# Patient Record
Sex: FEMALE | Race: WHITE | Employment: OTHER | URBAN - METROPOLITAN AREA
[De-identification: names, ages, dates, MRNs, and addresses within clinical notes are randomized per-mention and may not be internally consistent; named-entity substitution may affect disease eponyms.]

---

## 2020-02-10 ENCOUNTER — APPOINTMENT (OUTPATIENT)
Dept: GENERAL RADIOLOGY | Age: 60
End: 2020-02-10
Payer: MEDICARE

## 2020-02-10 ENCOUNTER — HOSPITAL ENCOUNTER (EMERGENCY)
Age: 60
Discharge: HOME | End: 2020-02-10
Payer: MEDICARE

## 2020-02-10 VITALS
SYSTOLIC BLOOD PRESSURE: 121 MMHG | DIASTOLIC BLOOD PRESSURE: 74 MMHG | RESPIRATION RATE: 20 BRPM | HEART RATE: 73 BPM | BODY MASS INDEX: 36.25 KG/M2 | OXYGEN SATURATION: 98 % | HEIGHT: 61 IN | WEIGHT: 192 LBS | TEMPERATURE: 98.1 F

## 2020-02-10 PROCEDURE — 99283 EMERGENCY DEPT VISIT LOW MDM: CPT

## 2020-02-10 PROCEDURE — 6370000000 HC RX 637 (ALT 250 FOR IP): Performed by: NURSE PRACTITIONER

## 2020-02-10 PROCEDURE — 71101 X-RAY EXAM UNILAT RIBS/CHEST: CPT

## 2020-02-10 RX ORDER — HYDROCODONE BITARTRATE AND ACETAMINOPHEN 5; 325 MG/1; MG/1
1 TABLET ORAL EVERY 6 HOURS PRN
Qty: 10 TABLET | Refills: 0 | Status: SHIPPED | OUTPATIENT
Start: 2020-02-10 | End: 2020-02-13

## 2020-02-10 RX ORDER — CYCLOBENZAPRINE HCL 10 MG
10 TABLET ORAL 3 TIMES DAILY PRN
Qty: 15 TABLET | Refills: 0 | Status: SHIPPED | OUTPATIENT
Start: 2020-02-10 | End: 2020-02-20

## 2020-02-10 RX ORDER — CYCLOBENZAPRINE HCL 10 MG
10 TABLET ORAL ONCE
Status: COMPLETED | OUTPATIENT
Start: 2020-02-10 | End: 2020-02-10

## 2020-02-10 RX ORDER — HYDROCODONE BITARTRATE AND ACETAMINOPHEN 5; 325 MG/1; MG/1
1 TABLET ORAL ONCE
Status: COMPLETED | OUTPATIENT
Start: 2020-02-10 | End: 2020-02-10

## 2020-02-10 RX ADMIN — HYDROCODONE BITARTRATE AND ACETAMINOPHEN 1 TABLET: 5; 325 TABLET ORAL at 11:54

## 2020-02-10 RX ADMIN — CYCLOBENZAPRINE 10 MG: 10 TABLET, FILM COATED ORAL at 11:55

## 2020-02-10 ASSESSMENT — ENCOUNTER SYMPTOMS
BACK PAIN: 0
CHEST TIGHTNESS: 0
SORE THROAT: 0
ABDOMINAL PAIN: 0
WHEEZING: 0
COUGH: 0
TROUBLE SWALLOWING: 0
NAUSEA: 0
SHORTNESS OF BREATH: 0
VOMITING: 0
DIARRHEA: 0

## 2020-02-10 ASSESSMENT — PAIN DESCRIPTION - PAIN TYPE: TYPE: ACUTE PAIN

## 2020-02-10 ASSESSMENT — PAIN DESCRIPTION - DESCRIPTORS: DESCRIPTORS: ACHING

## 2020-02-10 ASSESSMENT — PAIN SCALES - GENERAL
PAINLEVEL_OUTOF10: 10
PAINLEVEL_OUTOF10: 10

## 2020-02-10 ASSESSMENT — PAIN DESCRIPTION - ORIENTATION: ORIENTATION: RIGHT

## 2020-02-10 ASSESSMENT — PAIN DESCRIPTION - LOCATION: LOCATION: RIB CAGE

## 2020-02-10 NOTE — ED NOTES
AAOX3, equal/even resp rate. RN reviewed DC instructions with pt, pt verbalized understanding of DC instructions. Pt verbalized understanding of medications, pt home with medication prescriptions. Pt ambulated at DC with no difficulty.      Jonny Varela RN  02/10/20 4134

## 2020-02-10 NOTE — ED PROVIDER NOTES
Non-medical: None   Tobacco Use    Smoking status: Current Every Day Smoker     Packs/day: 1.50     Types: Cigarettes    Smokeless tobacco: Never Used   Substance and Sexual Activity    Alcohol use: Not Currently    Drug use: None    Sexual activity: None   Lifestyle    Physical activity:     Days per week: None     Minutes per session: None    Stress: None   Relationships    Social connections:     Talks on phone: None     Gets together: None     Attends Mosque service: None     Active member of club or organization: None     Attends meetings of clubs or organizations: None     Relationship status: None    Intimate partner violence:     Fear of current or ex partner: None     Emotionally abused: None     Physically abused: None     Forced sexual activity: None   Other Topics Concern    None   Social History Narrative    None       SCREENINGS      @FLOW([id number])@      PHYSICAL EXAM    (up to 7 for level 4, 8 or more for level 5)     ED Triage Vitals [02/10/20 1054]   BP Temp Temp Source Pulse Resp SpO2 Height Weight   121/74 98.1 °F (36.7 °C) Oral 73 16 98 % 5' 1\" (1.549 m) 192 lb (87.1 kg)       Physical Exam  Vitals signs and nursing note reviewed. Constitutional:       Appearance: She is well-developed. HENT:      Head: Normocephalic and atraumatic. Right Ear: External ear normal.      Left Ear: External ear normal.   Eyes:      Conjunctiva/sclera: Conjunctivae normal.      Pupils: Pupils are equal, round, and reactive to light. Neck:      Musculoskeletal: Normal range of motion and neck supple. Cardiovascular:      Rate and Rhythm: Normal rate and regular rhythm. Pulmonary:      Effort: Pulmonary effort is normal.      Breath sounds: Normal breath sounds. No decreased air movement. No decreased breath sounds or wheezing. Chest:      Chest wall: Tenderness present. No deformity, swelling, crepitus or edema. There is no dullness to percussion.        Abdominal:      General: Bowel

## 2020-03-05 ENCOUNTER — OFFICE VISIT (OUTPATIENT)
Dept: FAMILY MEDICINE CLINIC | Age: 60
End: 2020-03-05
Payer: COMMERCIAL

## 2020-03-05 VITALS
HEIGHT: 61 IN | OXYGEN SATURATION: 98 % | WEIGHT: 198.6 LBS | BODY MASS INDEX: 37.49 KG/M2 | RESPIRATION RATE: 16 BRPM | SYSTOLIC BLOOD PRESSURE: 122 MMHG | DIASTOLIC BLOOD PRESSURE: 72 MMHG | HEART RATE: 62 BPM | TEMPERATURE: 97.1 F

## 2020-03-05 PROBLEM — M65.30 TRIGGER FINGER: Chronic | Status: ACTIVE | Noted: 2020-03-05

## 2020-03-05 PROBLEM — M54.50 CHRONIC MIDLINE LOW BACK PAIN WITHOUT SCIATICA: Chronic | Status: ACTIVE | Noted: 2020-03-05

## 2020-03-05 PROBLEM — R76.0 ABNORMAL ANTINUCLEAR ANTIBODY TITER: Chronic | Status: ACTIVE | Noted: 2020-03-05

## 2020-03-05 PROBLEM — K21.9 GERD (GASTROESOPHAGEAL REFLUX DISEASE): Chronic | Status: ACTIVE | Noted: 2020-03-05

## 2020-03-05 PROBLEM — F39 MOOD DISORDER (HCC): Chronic | Status: ACTIVE | Noted: 2020-03-05

## 2020-03-05 PROBLEM — G89.29 CHRONIC NECK PAIN: Chronic | Status: ACTIVE | Noted: 2020-03-05

## 2020-03-05 PROBLEM — D64.9 ANEMIA: Chronic | Status: ACTIVE | Noted: 2020-03-05

## 2020-03-05 PROBLEM — R00.0 TACHYCARDIA: Chronic | Status: ACTIVE | Noted: 2020-03-05

## 2020-03-05 PROBLEM — Z72.0 TOBACCO ABUSE: Chronic | Status: ACTIVE | Noted: 2020-03-05

## 2020-03-05 PROBLEM — M67.90 TENDINOPATHY: Chronic | Status: ACTIVE | Noted: 2020-03-05

## 2020-03-05 PROBLEM — M79.18 CHRONIC MUSCULOSKELETAL PAIN: Chronic | Status: ACTIVE | Noted: 2020-03-05

## 2020-03-05 PROBLEM — G89.29 CHRONIC MUSCULOSKELETAL PAIN: Chronic | Status: ACTIVE | Noted: 2020-03-05

## 2020-03-05 PROBLEM — E11.49 TYPE 2 DIABETES MELLITUS WITH NEUROLOGIC COMPLICATION, WITH LONG-TERM CURRENT USE OF INSULIN (HCC): Chronic | Status: ACTIVE | Noted: 2020-03-05

## 2020-03-05 PROBLEM — M54.2 CHRONIC NECK PAIN: Chronic | Status: ACTIVE | Noted: 2020-03-05

## 2020-03-05 PROBLEM — M85.80 OSTEOPENIA: Chronic | Status: ACTIVE | Noted: 2020-03-05

## 2020-03-05 PROBLEM — G89.29 CHRONIC MIDLINE LOW BACK PAIN WITHOUT SCIATICA: Chronic | Status: ACTIVE | Noted: 2020-03-05

## 2020-03-05 PROBLEM — Z79.4 TYPE 2 DIABETES MELLITUS WITH NEUROLOGIC COMPLICATION, WITH LONG-TERM CURRENT USE OF INSULIN (HCC): Chronic | Status: ACTIVE | Noted: 2020-03-05

## 2020-03-05 PROCEDURE — 4004F PT TOBACCO SCREEN RCVD TLK: CPT | Performed by: FAMILY MEDICINE

## 2020-03-05 PROCEDURE — 99204 OFFICE O/P NEW MOD 45 MIN: CPT | Performed by: FAMILY MEDICINE

## 2020-03-05 PROCEDURE — 2022F DILAT RTA XM EVC RTNOPTHY: CPT | Performed by: FAMILY MEDICINE

## 2020-03-05 PROCEDURE — 3017F COLORECTAL CA SCREEN DOC REV: CPT | Performed by: FAMILY MEDICINE

## 2020-03-05 PROCEDURE — G8417 CALC BMI ABV UP PARAM F/U: HCPCS | Performed by: FAMILY MEDICINE

## 2020-03-05 PROCEDURE — G8427 DOCREV CUR MEDS BY ELIG CLIN: HCPCS | Performed by: FAMILY MEDICINE

## 2020-03-05 PROCEDURE — 3046F HEMOGLOBIN A1C LEVEL >9.0%: CPT | Performed by: FAMILY MEDICINE

## 2020-03-05 PROCEDURE — G8484 FLU IMMUNIZE NO ADMIN: HCPCS | Performed by: FAMILY MEDICINE

## 2020-03-05 RX ORDER — ATENOLOL 25 MG/1
TABLET ORAL
COMMUNITY
Start: 2020-01-31 | End: 2020-04-28 | Stop reason: SDUPTHER

## 2020-03-05 RX ORDER — PRASTERONE (DHEA) 50 MG
1 TABLET ORAL DAILY
COMMUNITY
End: 2020-09-29

## 2020-03-05 RX ORDER — CLOTRIMAZOLE AND BETAMETHASONE DIPROPIONATE 10; .64 MG/G; MG/G
CREAM TOPICAL
COMMUNITY
Start: 2019-05-13 | End: 2020-09-29

## 2020-03-05 RX ORDER — CALCIUM CITRATE/VITAMIN D3 200MG-6.25
TABLET ORAL
COMMUNITY
Start: 2020-01-22 | End: 2020-07-31 | Stop reason: SDUPTHER

## 2020-03-05 RX ORDER — PEN NEEDLE, DIABETIC 32GX 5/32"
NEEDLE, DISPOSABLE MISCELLANEOUS
COMMUNITY
Start: 2020-01-03 | End: 2020-07-31 | Stop reason: SDUPTHER

## 2020-03-05 RX ORDER — INSULIN ASPART 100 [IU]/ML
INJECTION, SOLUTION INTRAVENOUS; SUBCUTANEOUS
COMMUNITY
Start: 2019-12-09 | End: 2020-04-28 | Stop reason: SDUPTHER

## 2020-03-05 RX ORDER — GLUCOSAM/CHON-MSM1/C/MANG/BOSW 500-416.6
TABLET ORAL
COMMUNITY
Start: 2020-01-21

## 2020-03-05 RX ORDER — ZOLPIDEM TARTRATE 10 MG/1
10 TABLET ORAL
COMMUNITY
Start: 2019-10-31 | End: 2021-03-22 | Stop reason: ALTCHOICE

## 2020-03-05 RX ORDER — HYDROXYZINE HYDROCHLORIDE 25 MG/1
TABLET, FILM COATED ORAL
COMMUNITY
Start: 2019-12-16 | End: 2020-03-05 | Stop reason: ALTCHOICE

## 2020-03-05 RX ORDER — PRAVASTATIN SODIUM 20 MG
TABLET ORAL
COMMUNITY
End: 2020-03-05

## 2020-03-05 RX ORDER — CYCLOBENZAPRINE HCL 10 MG
10 TABLET ORAL NIGHTLY PRN
Qty: 30 TABLET | Refills: 2 | Status: SHIPPED | OUTPATIENT
Start: 2020-03-05 | End: 2020-03-15

## 2020-03-05 RX ORDER — MELOXICAM 7.5 MG/1
TABLET ORAL
COMMUNITY
End: 2020-03-05 | Stop reason: ALTCHOICE

## 2020-03-05 RX ORDER — CIPROFLOXACIN 500 MG/1
TABLET, FILM COATED ORAL
COMMUNITY
End: 2020-03-05 | Stop reason: ALTCHOICE

## 2020-03-05 RX ORDER — FERROUS SULFATE 325(65) MG
325 TABLET ORAL
COMMUNITY
End: 2020-11-09 | Stop reason: ALTCHOICE

## 2020-03-05 RX ORDER — ESOMEPRAZOLE MAGNESIUM 40 MG/1
CAPSULE, DELAYED RELEASE ORAL
COMMUNITY
Start: 2020-01-27 | End: 2020-07-31 | Stop reason: SDUPTHER

## 2020-03-05 RX ORDER — PREGABALIN 100 MG/1
CAPSULE ORAL
COMMUNITY
Start: 2020-01-27 | End: 2020-04-28 | Stop reason: SDUPTHER

## 2020-03-05 RX ORDER — RANITIDINE 150 MG/1
TABLET ORAL
COMMUNITY
End: 2020-03-05

## 2020-03-05 RX ORDER — GLIPIZIDE 10 MG/1
TABLET ORAL
COMMUNITY
End: 2020-03-05

## 2020-03-05 RX ORDER — ACETAMINOPHEN 500 MG
500 TABLET ORAL
Status: ON HOLD | COMMUNITY
End: 2020-05-05 | Stop reason: HOSPADM

## 2020-03-05 RX ORDER — INSULIN GLARGINE 100 [IU]/ML
INJECTION, SOLUTION SUBCUTANEOUS
COMMUNITY
Start: 2020-02-03 | End: 2020-04-28 | Stop reason: SDUPTHER

## 2020-03-05 RX ORDER — GABAPENTIN 600 MG/1
TABLET ORAL
COMMUNITY
End: 2020-03-05 | Stop reason: ALTCHOICE

## 2020-03-05 RX ORDER — DIPHENHYDRAMINE HCL 25 MG
TABLET ORAL
COMMUNITY
Start: 2020-01-27

## 2020-03-05 RX ORDER — IBUPROFEN 800 MG/1
TABLET ORAL
COMMUNITY
Start: 2020-01-02 | End: 2020-03-05

## 2020-03-05 RX ORDER — AZITHROMYCIN 250 MG/1
TABLET, FILM COATED ORAL
COMMUNITY
End: 2020-03-05 | Stop reason: ALTCHOICE

## 2020-03-05 RX ORDER — LISINOPRIL 2.5 MG/1
TABLET ORAL
Status: ON HOLD | COMMUNITY
End: 2020-05-05 | Stop reason: ALTCHOICE

## 2020-03-05 RX ORDER — CEPHALEXIN 500 MG/1
CAPSULE ORAL
COMMUNITY
End: 2020-03-05 | Stop reason: ALTCHOICE

## 2020-03-05 RX ORDER — DULOXETIN HYDROCHLORIDE 60 MG/1
CAPSULE, DELAYED RELEASE ORAL
COMMUNITY
End: 2020-04-08 | Stop reason: SDUPTHER

## 2020-03-05 RX ORDER — HYDROXYZINE PAMOATE 25 MG/1
CAPSULE ORAL
COMMUNITY
End: 2020-07-31 | Stop reason: SDUPTHER

## 2020-03-05 ASSESSMENT — PATIENT HEALTH QUESTIONNAIRE - PHQ9
1. LITTLE INTEREST OR PLEASURE IN DOING THINGS: 0
SUM OF ALL RESPONSES TO PHQ9 QUESTIONS 1 & 2: 1
SUM OF ALL RESPONSES TO PHQ QUESTIONS 1-9: 1
2. FEELING DOWN, DEPRESSED OR HOPELESS: 1
SUM OF ALL RESPONSES TO PHQ QUESTIONS 1-9: 1

## 2020-03-05 NOTE — PROGRESS NOTES
Subjective  Vincent Mott, 63 y.o. female presents today with:  Chief Complaint   Patient presents with    Established New Doctor     Patient present today to Establish Care. Previous PCP from [address] [place] [address].  Health Maintenance     last mamm done in June of 2019. states not due for CRC           HPI    This is a new patient to me. I have reviewed the past medical and surgical history, social history and family history provided. I have reviewed  medication, previous testing and working diagnoses. I have reviewed the allergies and health maintenance information and correlated it into my decision making for this patient for care, diagnostics, consultations and treatment for today's visit. Takes ibuprofen for pain in every joint in her body and a bulging disc and fibromyalgia. Chronic constant pain in neck, low back, shoulders, knees. Hands go numb when shoulder are hurting. Flexeril helps her sleep. H/O elevated SOPHIA 1:180, homogeneous. H/O osteopenia with recent pathologic rib fracture. Tobacco use disorder. Smokes 1-2 packs/day. Has tried to quit by going cold turkey. Desires medical intervention in the future. Understands complications related to tobacco smoking. Has h/o tachycardia x years. Was put on atenolol which has been effective. Also has history of anemia and mood disorder. States mood disorder is well controlled with duloxetine. HAs two trigger fingers and was supposed to have surgery on the one on left hand. Left 3rd digit has had several injections. 4th digit on left hand lock up. Pain is severe when they are locked up. No other questions and or concerns for today's visit      Review of Systems no fevers, chills or sweats. Has chronic heartburn. No bloody or black tarry stools. Past Medical History:   Diagnosis Date    Arthritis     Diabetes mellitus ([address]     Fibromyalgia      History reviewed. No pertinent surgical history.   Social History     Socioeconomic History  Marital status:      Spouse name: Not on file    Number of children: Not on file    Years of education: Not on file    Highest education level: Not on file   Occupational History    Not on file   Social Needs    Financial resource strain: Not on file    Food insecurity     Worry: Not on file     Inability: Not on file    Transportation needs     Medical: Not on file     Non-medical: Not on file   Tobacco Use    Smoking status: Current Every Day Smoker     Packs/day: 1.50     Types: Cigarettes    Smokeless tobacco: Never Used   Substance and Sexual Activity    Alcohol use: Not Currently    Drug use: Not on file    Sexual activity: Not on file   Lifestyle    Physical activity     Days per week: Not on file     Minutes per session: Not on file    Stress: Not on file   Relationships    Social connections     Talks on phone: Not on file     Gets together: Not on file     Attends Caodaism service: Not on file     Active member of club or organization: Not on file     Attends meetings of clubs or organizations: Not on file     Relationship status: Not on file    Intimate partner violence     Fear of current or ex partner: Not on file     Emotionally abused: Not on file     Physically abused: Not on file     Forced sexual activity: Not on file   Other Topics Concern    Not on file   Social History Narrative    Not on file     History reviewed. No pertinent family history. Allergies   Allergen Reactions    Bactrim [Sulfamethoxazole-Trimethoprim]     Oxycodone     Statins      Current Outpatient Medications   Medication Sig Dispense Refill    zolpidem (AMBIEN) 10 MG tablet Take 10 mg by mouth. Take when have trouble to sleep.       pregabalin (LYRICA) 100 MG capsule TAKE 1 CAPSULE BY MOUTH TWICE DAILY FOR 90 DAYS      metFORMIN (GLUCOPHAGE) 1000 MG tablet TAKE 1 TABLET BY MOUTH TWICE DAILY WITH MEALS      TRUEplus Lancets 33G MISC USE 1 TO CHECK GLUCOSE 4 TIMES DAILY      BD PEN NEEDLE STEFFANIE U/F 32G X 4 MM MISC USE 1 TO INJECT INSULIN 4 TIMES DAILY      BASAGLAR KWIKPEN 100 UNIT/ML injection pen INJECT 55 UNITS SUBCUTANEOUSLY ONCE DAILY      NOVOLOG FLEXPEN 100 UNIT/ML injection pen INJECT 8 UNITS SUBCUTANEOUSLY THREE TIMES DAILY BEFORE MEAL(S)      TRUE METRIX BLOOD GLUCOSE TEST strip USE 1 STRIP TO CHECK GLUCOSE 4 TIMES DAILY      esomeprazole (NEXIUM) 40 MG delayed release capsule TAKE 1 CAPSULE BY MOUTH ONCE DAILY      clotrimazole-betamethasone (LOTRISONE) 1-0.05 % cream Apply topically      Blood Glucose Monitoring Suppl (TRUE METRIX AIR GLUCOSE METER) w/Device KIT USE TO CHECK GLUCOSE 4 TIMES DAILY      atenolol (TENORMIN) 25 MG tablet TAKE 1 TABLET BY MOUTH ONCE DAILY      acetaminophen (TYLENOL) 500 MG tablet Take 500 mg by mouth      hydrOXYzine (VISTARIL) 25 MG capsule hydroxyzine pamoate 25 mg capsule      DULoxetine (CYMBALTA) 60 MG extended release capsule duloxetine 60 mg capsule,delayed release      ferrous sulfate 325 (65 Fe) MG tablet Take 325 mg by mouth daily (with breakfast)      DHEA 50 MG TABS Take 1 tablet by mouth daily      cyclobenzaprine (FLEXERIL) 10 MG tablet Take 1 tablet by mouth nightly as needed for Muscle spasms 30 tablet 2    lisinopril (PRINIVIL;ZESTRIL) 2.5 MG tablet lisinopril 2.5 mg tablet       No current facility-administered medications for this visit. PMH, Surgical Hx, Family Hx, and Social Hxreviewed and updated. Health Maintenance reviewed.     Objective    Vitals:    03/05/20 1341   BP: 122/72   Site: Left Upper Arm   Position: Sitting   Cuff Size: Medium Adult   Pulse: 62   Resp: 16   Temp: 97.1 °F (36.2 °C)   TempSrc: Temporal   SpO2: 98%   Weight: 198 lb 9.6 oz (90.1 kg)   Height: 5' 1\" (1.549 m)        Physical Exam      No results found for: LABA1C  No results found for: LABMICR, CREATININE  No results found for: ALT, AST  No results found for: CHOL, TRIG, HDL, LDLCALC, LDLDIRECT     Assessment & Plan   Visit Diagnoses and Associated Orders     Vitamin D deficiency    -  Primary    Vitamin D 25 Hydroxy [87288 Custom]   - Future Order         Gastroesophageal reflux disease, esophagitis presence not specified        Stable and well-controlled with Nexium. Symptom reduction strategies reviewed         Type 2 diabetes mellitus with diabetic neuropathy, with long-term current use of insulin (HCC)        Worse, poor control in that chronic care follow-up is been lacking. Reviewed healthy diet and importance of adherence to medical plan. Labs ordered    Hemoglobin A1C [88030 Custom]   - Future Order    Comprehensive Metabolic Panel [70758 Custom]   - Future Order    Lipid, Fasting [85369 Custom]   - Future Order    TSH Without Reflex [[id number] Custom]   - Future Order         Anemia, unspecified type        Unclear etiology. Check CBC. Follow-up with labs as needed    CBC With Auto Differential [[id number] Custom]   - Future Order         Mood disorder [facility])        Patient reports stable and well-controlled on duloxetine. Also takes Ambien for insomnia. Informed I will not prescribe. Patient understands         Chronic musculoskeletal pain        Refer to Dr. Mariana Rush. OTC lidocaine patches. Naprosyn sparingly and APAP per package instructions    Lacey Watts, DO, Physical Medicine Rehab, [place] [[id number] Custom]      SOPHIA Screen With Reflex [[id number] Custom]   - Future Order    C-Reactive Protein [[id number] Custom]   - Future Order    Cyclic Citrul Peptide Antibody, IgG [[id number] Custom]   - Future Order    Rheumatoid Factor [53691 Custom]   - Future Order    Sedimentation Rate [52365 Custom]   - Future Order         Chronic midline low back pain without sciatica        Refer to Dr. Mariana Rush. OTC lidocaine patches. Naprosyn sparingly and APAP per package instructions    [address], DO, Physical Medicine Rehab, [place] [[id number] Custom]           Chronic neck pain        Refer to Dr. Mariana Rush. OTC lidocaine patches.  Naprosyn sparingly and APAP per package instructions    [address], DO, Physical Medicine Rehab, [place] [[id number] Custom]           Osteopenia, unspecified location        Suspect osteoporosis given recent pathologic fracture. DEXA scan ordered. DEXA BONE DENSITY AXIAL SKELETON [[id number] Custom]   - Future Order         Tobacco abuse        Urged cessation. Will address incrementally. Tachycardia        Well-controlled with atenolol. Abnormal antinuclear antibody titer        Referred to rheumatology given chronic musculoskeletal pain    SOPHIA Screen With Reflex [[id number] Custom]   - Future Order    C-Reactive Protein [[id number] Custom]   - Future Order    Cyclic Citrul Peptide Antibody, IgG [[id number] Custom]   - Future Order    Rheumatoid Factor [54633 Custom]   - Future Order    Sedimentation Rate [22577 Custom]   - Future Order         Trigger finger, unspecified finger, unspecified laterality        Referred to orthopedics. Danny Alarcon MD, Orthopaedic Surgery [[id number] Custom]           Age-related osteoporosis with current pathological fracture, vertebra(e), initial encounter for fracture ([facility] [address]        Worse, poor control. DEXA ordered. DEXA BONE DENSITY AXIAL SKELETON [[id number] Custom]   - Future Order         Fibromyalgia        With chronic musculoskeletal pain and fatigue. Referred for consideration of trigger point injections. Continue duloxetine. PRN Flexeril.     [address], DO, [facility] [[id number] Custom]      cyclobenzaprine (FLEXERIL) 10 MG tablet [2017]           ORDERS WITHOUT AN ASSOCIATED DIAGNOSIS    zolpidem (AMBIEN) 10 MG tablet [68625]      pregabalin (LYRICA) 100 MG capsule [36673]      metFORMIN (GLUCOPHAGE) 1000 MG tablet [75730]      TRUEplus Lancets 33G MISC [470182]      BD PEN NEEDLE STEFFANIE U/F 32G X 4 MM MISC [609138]      BASAGLAR KWIKPEN 100 UNIT/ML injection pen [449250]      NOVOLOG FLEXPEN 100 UNIT/ML injection pen [122915]      TRUE METRIX BLOOD GLUCOSE TEST strip [599352]      esomeprazole (NEXIUM) 40 MG delayed release capsule [16570]      clotrimazole-betamethasone (LOTRISONE) 1-0.05 % cream [64484]      Blood Glucose Monitoring Suppl (TRUE METRIX AIR GLUCOSE METER) w/Device KIT [840354]      atenolol (TENORMIN) 25 MG tablet [717]      acetaminophen (TYLENOL) 500 MG tablet [102]      lisinopril (PRINIVIL;ZESTRIL) 2.5 MG tablet [86797]      hydrOXYzine (VISTARIL) 25 MG capsule [3517]      DULoxetine (CYMBALTA) 60 MG extended release capsule [59403]      ferrous sulfate 325 (65 Fe) MG tablet [9064]      DHEA 50 MG TABS [38423]              Reviewed with the patient: all disease processes, current clinical status, medications, activities and diet.      Side effects, adverse effects of the medication prescribed today, as well as treatment plan/ rationale and result expectations have been discussed with the patient who expresses understanding and desires to proceed.     Close follow up to evaluate treatment results and for coordination of care. I have reviewed the patient's medical history in detail and updated the computerized patient record. More than 50% of the appointment was spent in face-to-face counseling, education and care coordination. Please note this report has been partially produced using speech recognition software and may contain mistakes related to that system including errors in grammar, punctuation and spelling as well as words and phrases that may seem inappropriate. If there are questions or concerns, please feel free to contact me to clarify. Orders Placed This Encounter   Procedures    DEXA BONE DENSITY AXIAL SKELETON     Standing Status:   Future     Standing Expiration Date:   3/5/2021     Order Specific Question:   Reason for exam:     Answer:   h/o osteopenia with recent pathological rib fracture.     Vitamin D 25 Hydroxy     Standing Status:   Future     Standing Expiration Date:   3/5/2021    Hemoglobin (ASCENSIA AUTODISC VI;ONE TOUCH ULTRA TEST VI) strip LIST CLEANUP    azithromycin (ZITHROMAX) 250 MG tablet Therapy completed    cephALEXin (KEFLEX) 500 MG capsule Therapy completed    ciprofloxacin (CIPRO) 500 MG tablet Therapy completed    meloxicam (MOBIC) 7.5 MG tablet Therapy completed    gabapentin (NEURONTIN) 600 MG tablet Therapy completed    hydrOXYzine (ATARAX) 25 MG tablet Therapy completed    Nutritional Supplements (BOOST NUTRITIONAL ENERGY PO) Therapy completed    pravastatin (PRAVACHOL) 20 MG tablet LIST CLEANUP    raNITIdine (ZANTAC) 150 MG tablet LIST CLEANUP    SITagliptin (JANUVIA) 100 MG tablet LIST CLEANUP    ibuprofen (ADVIL;MOTRIN) 800 MG tablet LIST CLEANUP     Return in about 2 weeks (around 3/19/2020) for CC, Mood Disorder, GERD, tobacco.        Controlled Substance Monitoring:    Acute and Chronic Pain Monitoring:   No flowsheet data found.         Ariella Rene MD

## 2020-03-18 ENCOUNTER — TELEPHONE (OUTPATIENT)
Dept: FAMILY MEDICINE CLINIC | Age: 60
End: 2020-03-18

## 2020-03-18 NOTE — TELEPHONE ENCOUNTER
Patient called but not able to answer the phone. Talk to her Daughter Najma Robin she is advised of the phone visit. Patient tsonether want you to call to her number [phone]. Thanks.

## 2020-03-19 ENCOUNTER — VIRTUAL VISIT (OUTPATIENT)
Dept: FAMILY MEDICINE CLINIC | Age: 60
End: 2020-03-19
Payer: MEDICARE

## 2020-03-19 PROCEDURE — 99442 PR PHYS/QHP TELEPHONE EVALUATION 11-20 MIN: CPT | Performed by: FAMILY MEDICINE

## 2020-03-19 NOTE — PROGRESS NOTES
Subjective  Radha Andrea, 63 y.o. female presents today with:  No chief complaint on file. HPI      This telephone encounter was conducted between this writer who is home-based and the above named patient who is at home in lieu of a face-to-face visit in setting of [facility] public health emergency. 03/05/2020:Takes ibuprofen for pain in every joint in her body and a bulging disc and fibromyalgia. Chronic constant pain in neck, low back, shoulders, knees. Hands go numb when shoulder are hurting. Flexeril helps her sleep. H/O elevated SOPHIA 1:180, homogeneous. H/O osteopenia with recent pathologic rib fracture.      Tobacco use disorder. Smokes 1-2 packs/day. Has tried to quit by going cold turkey. Desires medical intervention in the future. Understands complications related to tobacco smoking.     Has h/o tachycardia x years. Was put on atenolol which has been effective. Also has history of anemia and mood disorder. States mood disorder is well controlled with duloxetine.     HAs two trigger fingers and was supposed to have surgery on the one on left hand. Left 3rd digit has had several injections. 4th digit on left hand lock up. Pain is severe when they are locked up.     03/19/2020: History was reviewed with patient and is unchanged at this time. She has been unable to have her labs drawn. She does have an appointment scheduled with orthopedics but it is suspected that that appointment will be significantly delayed due to COVID-19 outbreak. At this time she understands importance of social distancing and maintaining adequate med refills. Time spent: 11-20 minutes  No other questions and or concerns for today's visit      Review of Systems    No fevers, chills, sweats. No unintended weight loss. No abdominal pain, nausea, vomiting, diarrhea, constipation, bloody stools, black tarry stools. No rashes. No swollen glands.     Past Medical History:   Diagnosis Date    Arthritis     Diabetes mellitus [address] [address]     Fibromyalgia      No past surgical history on file. Social History     Socioeconomic History    Marital status:      Spouse name: Not on file    Number of children: Not on file    Years of education: Not on file    Highest education level: Not on file   Occupational History    Not on file   Social Needs    Financial resource strain: Not on file    Food insecurity     Worry: Not on file     Inability: Not on file    Transportation needs     Medical: Not on file     Non-medical: Not on file   Tobacco Use    Smoking status: Current Every Day Smoker     Packs/day: 1.50     Types: Cigarettes    Smokeless tobacco: Never Used   Substance and Sexual Activity    Alcohol use: Not Currently    Drug use: Not on file    Sexual activity: Not on file   Lifestyle    Physical activity     Days per week: Not on file     Minutes per session: Not on file    Stress: Not on file   Relationships    Social connections     Talks on phone: Not on file     Gets together: Not on file     Attends Gnosticist service: Not on file     Active member of club or organization: Not on file     Attends meetings of clubs or organizations: Not on file     Relationship status: Not on file    Intimate partner violence     Fear of current or ex partner: Not on file     Emotionally abused: Not on file     Physically abused: Not on file     Forced sexual activity: Not on file   Other Topics Concern    Not on file   Social History Narrative    Not on file     No family history on file. Allergies   Allergen Reactions    Bactrim [Sulfamethoxazole-Trimethoprim]     Oxycodone     Statins      Current Outpatient Medications   Medication Sig Dispense Refill    zolpidem (AMBIEN) 10 MG tablet Take 10 mg by mouth. Take when have trouble to sleep.       pregabalin (LYRICA) 100 MG capsule TAKE 1 CAPSULE BY MOUTH TWICE DAILY FOR 90 DAYS      metFORMIN (GLUCOPHAGE) 1000 MG tablet TAKE 1 TABLET BY MOUTH TWICE DAILY WITH MEALS      TRUEplus Lancets 33G MISC USE 1 TO CHECK GLUCOSE 4 TIMES DAILY      BD PEN NEEDLE STEFFANIE U/F 32G X 4 MM MISC USE 1 TO INJECT INSULIN 4 TIMES DAILY      BASAGLAR KWIKPEN 100 UNIT/ML injection pen INJECT 55 UNITS SUBCUTANEOUSLY ONCE DAILY      NOVOLOG FLEXPEN 100 UNIT/ML injection pen INJECT 8 UNITS SUBCUTANEOUSLY THREE TIMES DAILY BEFORE MEAL(S)      TRUE METRIX BLOOD GLUCOSE TEST strip USE 1 STRIP TO CHECK GLUCOSE 4 TIMES DAILY      esomeprazole (NEXIUM) 40 MG delayed release capsule TAKE 1 CAPSULE BY MOUTH ONCE DAILY      clotrimazole-betamethasone (LOTRISONE) 1-0.05 % cream Apply topically      Blood Glucose Monitoring Suppl (TRUE METRIX AIR GLUCOSE METER) w/Device KIT USE TO CHECK GLUCOSE 4 TIMES DAILY      atenolol (TENORMIN) 25 MG tablet TAKE 1 TABLET BY MOUTH ONCE DAILY      acetaminophen (TYLENOL) 500 MG tablet Take 500 mg by mouth      lisinopril (PRINIVIL;ZESTRIL) 2.5 MG tablet lisinopril 2.5 mg tablet      hydrOXYzine (VISTARIL) 25 MG capsule hydroxyzine pamoate 25 mg capsule      DULoxetine (CYMBALTA) 60 MG extended release capsule duloxetine 60 mg capsule,delayed release      ferrous sulfate 325 (65 Fe) MG tablet Take 325 mg by mouth daily (with breakfast)      DHEA 50 MG TABS Take 1 tablet by mouth daily       No current facility-administered medications for this visit. PMH, Surgical Hx, Family Hx, and Social Hxreviewed and updated. Health Maintenance reviewed. Objective    There were no vitals filed for this visit. Physical Exam    Patient appears to be alert and oriented to person, place, time, situation. No apparent distress.   No results found for: LABA1C  No results found for: LABMICR, CREATININE  No results found for: ALT, AST  No results found for: CHOL, TRIG, HDL, LDLCALC, LDLDIRECT     Assessment & Plan   Visit Diagnoses and Associated Orders     Type 2 diabetes mellitus with diabetic neuropathy, with long-term [facility] use [facility]    - Primary    ReViewed importance of healthy diet and physical activity. Labs will be delayed         Chronic musculoskeletal pain        Appropriate referrals have been made but care will be delayed. Patient will call for refills if needed. Reviewed with the patient: all disease processes, current clinical status, medications, activities and diet.      Side effects, adverse effects of the medication prescribed today, as well as treatment plan/ rationale and result expectations have been discussed with the patient who expresses understanding and desires to proceed.     Close follow up to evaluate treatment results and for coordination of care. I have reviewed the patient's medical history in detail and updated the computerized patient record. More than 50% of the appointment was spent incounseling, education and care coordination. Please note this report has been partially produced using speech recognition software and may contain mistakes related to that system including errors in grammar, punctuation and spelling as well as words and phrases that may seem inappropriate. If there are questions or concerns, please feel free to contact me to clarify. No orders of the defined types were placed in this encounter. No orders of the defined types were placed in this encounter. There are no discontinued medications. No follow-ups on file. Controlled Substance Monitoring:    Acute and Chronic Pain Monitoring:   No flowsheet data found.         Jose Christianson MD

## 2020-04-07 ENCOUNTER — OFFICE VISIT (OUTPATIENT)
Dept: ORTHOPEDIC SURGERY | Age: 60
End: 2020-04-07
Payer: COMMERCIAL

## 2020-04-07 VITALS — HEIGHT: 61 IN | BODY MASS INDEX: 37.38 KG/M2 | TEMPERATURE: 96.3 F | WEIGHT: 198 LBS

## 2020-04-07 PROBLEM — M65.332 TRIGGER FINGER, LEFT MIDDLE FINGER: Status: ACTIVE | Noted: 2020-04-07

## 2020-04-07 PROBLEM — M65.341 TRIGGER FINGER, RIGHT RING FINGER: Status: ACTIVE | Noted: 2020-04-07

## 2020-04-07 PROCEDURE — 4004F PT TOBACCO SCREEN RCVD TLK: CPT | Performed by: ORTHOPAEDIC SURGERY

## 2020-04-07 PROCEDURE — 3017F COLORECTAL CA SCREEN DOC REV: CPT | Performed by: ORTHOPAEDIC SURGERY

## 2020-04-07 PROCEDURE — 20550 NJX 1 TENDON SHEATH/LIGAMENT: CPT | Performed by: ORTHOPAEDIC SURGERY

## 2020-04-07 PROCEDURE — G8417 CALC BMI ABV UP PARAM F/U: HCPCS | Performed by: ORTHOPAEDIC SURGERY

## 2020-04-07 PROCEDURE — 99203 OFFICE O/P NEW LOW 30 MIN: CPT | Performed by: ORTHOPAEDIC SURGERY

## 2020-04-07 PROCEDURE — G8427 DOCREV CUR MEDS BY ELIG CLIN: HCPCS | Performed by: ORTHOPAEDIC SURGERY

## 2020-04-07 RX ORDER — BETAMETHASONE SODIUM PHOSPHATE AND BETAMETHASONE ACETATE 3; 3 MG/ML; MG/ML
9 INJECTION, SUSPENSION INTRA-ARTICULAR; INTRALESIONAL; INTRAMUSCULAR; SOFT TISSUE ONCE
Qty: 1.5 ML | Refills: 0
Start: 2020-04-07 | End: 2020-04-07 | Stop reason: CLARIF

## 2020-04-07 RX ORDER — BETAMETHASONE SODIUM PHOSPHATE AND BETAMETHASONE ACETATE 3; 3 MG/ML; MG/ML
15 INJECTION, SUSPENSION INTRA-ARTICULAR; INTRALESIONAL; INTRAMUSCULAR; SOFT TISSUE ONCE
Status: COMPLETED | OUTPATIENT
Start: 2020-04-07 | End: 2020-04-07

## 2020-04-07 RX ORDER — LIDOCAINE HYDROCHLORIDE 10 MG/ML
1.5 INJECTION, SOLUTION INFILTRATION; PERINEURAL ONCE
Status: COMPLETED | OUTPATIENT
Start: 2020-04-07 | End: 2020-04-07

## 2020-04-07 RX ADMIN — BETAMETHASONE SODIUM PHOSPHATE AND BETAMETHASONE ACETATE 15 MG: 3; 3 INJECTION, SUSPENSION INTRA-ARTICULAR; INTRALESIONAL; INTRAMUSCULAR; SOFT TISSUE at 16:24

## 2020-04-07 RX ADMIN — LIDOCAINE HYDROCHLORIDE 1.5 ML: 10 INJECTION, SOLUTION INFILTRATION; PERINEURAL at 16:28

## 2020-04-07 ASSESSMENT — ENCOUNTER SYMPTOMS
CHEST TIGHTNESS: 0
ABDOMINAL DISTENTION: 0

## 2020-04-07 NOTE — PROGRESS NOTES
sign is negative      Diagnostic Imaging:    No x-rays were taken today      Assessment:       Diagnosis Orders   1. Trigger finger, left middle finger  IA INJECT TENDON SHEATH/LIGAMENT   2.  Trigger finger, right ring finger  IA INJECT TENDON SHEATH/LIGAMENT         Plan:      Left hand middle finger trigger finger A1 pulley was prepped well with Betadine  Using a 25-gauge needle-half cc of Celestone 1-1/2 cc of Xylocaine 1% was injected into the tendon sheath  Tolerated the injection well  The FreeFlow of the injection could be felt in the tendon sheath, care was taken to avoid any direct injection into the tendon itself    Right hand ring finger    The A1 pulley to the ring finger was prepped well with Betadine, using 25-gauge needle, half cc of Celestone and 1 and half cc of 1% lidocaine injected into the tendon sheath, care was taken to avoid any injection into the tendon itself, there was free flow of the Xylocaine into the tendon sheath and this could be felt, Band-Aid dressing applied    I recommended that she avoid any lifting pulling or pushing strains with both the hands  Risk of rupture of the tendons have been explained    I explained to her in detail the surgical intervention that may be required for the left hand middle finger which has been causing considerable triggering and discomfort  The surgical procedure was explained  Risks and benefits of surgery were also discussed, risk of anesthesia which is a Biers block, of injury to the vessels nerve tendon, stop to complications such as hematoma formation, infection , rupture of the tendon recurrence of the symptoms , have all been discussed  She consents to have the surgical procedure done for the left hand middle finger    In the meantime she has been splinting her fingers to avoid any extreme flexion    She is going to get in touch with my office in the near future to schedule the surgery    Orders Placed This Encounter   Medications    DISCONTD:

## 2020-04-09 NOTE — TELEPHONE ENCOUNTER
Patient states that she is been taking medication for severe depression and last Dr. Raulito Connor prescribed was Dr. Rufina Haile.

## 2020-04-10 RX ORDER — DULOXETIN HYDROCHLORIDE 60 MG/1
60 CAPSULE, DELAYED RELEASE ORAL DAILY
Qty: 90 CAPSULE | Refills: 0 | Status: SHIPPED | OUTPATIENT
Start: 2020-04-10 | End: 2020-04-28 | Stop reason: SDUPTHER

## 2020-04-28 NOTE — TELEPHONE ENCOUNTER
Patient is a new patient calling in for refills. She also advised that the Cymbalta was called in incorrectly. She takes this 2 times a day. The remaining refills are the first time filled with you.

## 2020-04-29 RX ORDER — INSULIN GLARGINE 100 [IU]/ML
INJECTION, SOLUTION SUBCUTANEOUS
Qty: 15 PEN | Refills: 1 | Status: SHIPPED | OUTPATIENT
Start: 2020-04-29 | End: 2020-07-31 | Stop reason: SDUPTHER

## 2020-04-29 RX ORDER — ATENOLOL 25 MG/1
TABLET ORAL
Qty: 90 TABLET | Refills: 1 | Status: SHIPPED | OUTPATIENT
Start: 2020-04-29 | End: 2020-07-31 | Stop reason: SDUPTHER

## 2020-04-29 RX ORDER — PREGABALIN 100 MG/1
CAPSULE ORAL
Qty: 180 CAPSULE | Refills: 0 | Status: SHIPPED | OUTPATIENT
Start: 2020-04-29 | End: 2020-07-20 | Stop reason: SDUPTHER

## 2020-04-29 RX ORDER — INSULIN ASPART 100 [IU]/ML
INJECTION, SOLUTION INTRAVENOUS; SUBCUTANEOUS
Qty: 15 PEN | Refills: 1 | Status: SHIPPED | OUTPATIENT
Start: 2020-04-29 | End: 2020-07-31 | Stop reason: SDUPTHER

## 2020-04-29 RX ORDER — DULOXETIN HYDROCHLORIDE 60 MG/1
60 CAPSULE, DELAYED RELEASE ORAL 2 TIMES DAILY
Qty: 180 CAPSULE | Refills: 0 | Status: SHIPPED | OUTPATIENT
Start: 2020-04-29 | End: 2020-07-31 | Stop reason: SDUPTHER

## 2020-05-01 ENCOUNTER — OFFICE VISIT (OUTPATIENT)
Dept: ORTHOPEDIC SURGERY | Age: 60
End: 2020-05-01
Payer: COMMERCIAL

## 2020-05-01 VITALS
HEART RATE: 97 BPM | TEMPERATURE: 96.3 F | BODY MASS INDEX: 37.38 KG/M2 | WEIGHT: 198 LBS | OXYGEN SATURATION: 97 % | SYSTOLIC BLOOD PRESSURE: 160 MMHG | DIASTOLIC BLOOD PRESSURE: 72 MMHG | HEIGHT: 61 IN

## 2020-05-01 DIAGNOSIS — G89.29 CHRONIC MUSCULOSKELETAL PAIN: Chronic | ICD-10-CM

## 2020-05-01 DIAGNOSIS — Z01.818 PRE-OP EXAMINATION: ICD-10-CM

## 2020-05-01 DIAGNOSIS — D64.9 ANEMIA, UNSPECIFIED TYPE: Chronic | ICD-10-CM

## 2020-05-01 DIAGNOSIS — Z79.4 TYPE 2 DIABETES MELLITUS WITH DIABETIC NEUROPATHY, WITH LONG-TERM CURRENT USE OF INSULIN (HCC): Chronic | ICD-10-CM

## 2020-05-01 DIAGNOSIS — M79.18 CHRONIC MUSCULOSKELETAL PAIN: Chronic | ICD-10-CM

## 2020-05-01 DIAGNOSIS — R76.0 ABNORMAL ANTINUCLEAR ANTIBODY TITER: Chronic | ICD-10-CM

## 2020-05-01 DIAGNOSIS — E55.9 VITAMIN D DEFICIENCY: ICD-10-CM

## 2020-05-01 DIAGNOSIS — E11.40 TYPE 2 DIABETES MELLITUS WITH DIABETIC NEUROPATHY, WITH LONG-TERM CURRENT USE OF INSULIN (HCC): Chronic | ICD-10-CM

## 2020-05-01 LAB
ALBUMIN SERPL-MCNC: 4 G/DL (ref 3.5–4.6)
ALP BLD-CCNC: 104 U/L (ref 40–130)
ALT SERPL-CCNC: 35 U/L (ref 0–33)
ANION GAP SERPL CALCULATED.3IONS-SCNC: 21 MEQ/L (ref 9–15)
AST SERPL-CCNC: 26 U/L (ref 0–35)
BASOPHILS ABSOLUTE: 0 K/UL (ref 0–0.2)
BASOPHILS RELATIVE PERCENT: 0.5 %
BILIRUB SERPL-MCNC: <0.2 MG/DL (ref 0.2–0.7)
BUN BLDV-MCNC: 15 MG/DL (ref 6–20)
C-REACTIVE PROTEIN: 29.6 MG/L (ref 0–5)
CALCIUM SERPL-MCNC: 9.6 MG/DL (ref 8.5–9.9)
CHLORIDE BLD-SCNC: 95 MEQ/L (ref 95–107)
CHOLESTEROL, FASTING: 168 MG/DL (ref 0–199)
CO2: 22 MEQ/L (ref 20–31)
CREAT SERPL-MCNC: 1.05 MG/DL (ref 0.5–0.9)
EOSINOPHILS ABSOLUTE: 0.1 K/UL (ref 0–0.7)
EOSINOPHILS RELATIVE PERCENT: 1.9 %
GFR AFRICAN AMERICAN: >60
GFR NON-AFRICAN AMERICAN: 53.6
GLOBULIN: 3.7 G/DL (ref 2.3–3.5)
GLUCOSE BLD-MCNC: 333 MG/DL (ref 70–99)
HBA1C MFR BLD: 8.6 % (ref 4.8–5.9)
HCT VFR BLD CALC: 42.4 % (ref 37–47)
HDLC SERPL-MCNC: 28 MG/DL (ref 40–59)
HEMOGLOBIN: 13.7 G/DL (ref 12–16)
LDL CHOLESTEROL CALCULATED: 88 MG/DL (ref 0–129)
LYMPHOCYTES ABSOLUTE: 1.3 K/UL (ref 1–4.8)
LYMPHOCYTES RELATIVE PERCENT: 17.4 %
MCH RBC QN AUTO: 26.8 PG (ref 27–31.3)
MCHC RBC AUTO-ENTMCNC: 32.3 % (ref 33–37)
MCV RBC AUTO: 83 FL (ref 82–100)
MONOCYTES ABSOLUTE: 0.6 K/UL (ref 0.2–0.8)
MONOCYTES RELATIVE PERCENT: 7.9 %
NEUTROPHILS ABSOLUTE: 5.3 K/UL (ref 1.4–6.5)
NEUTROPHILS RELATIVE PERCENT: 72.3 %
PDW BLD-RTO: 15.7 % (ref 11.5–14.5)
PLATELET # BLD: 312 K/UL (ref 130–400)
POTASSIUM SERPL-SCNC: 3.7 MEQ/L (ref 3.4–4.9)
RBC # BLD: 5.1 M/UL (ref 4.2–5.4)
RHEUMATOID FACTOR: <10 IU/ML (ref 0–14)
SEDIMENTATION RATE, ERYTHROCYTE: 61 MM (ref 0–30)
SODIUM BLD-SCNC: 138 MEQ/L (ref 135–144)
TOTAL PROTEIN: 7.7 G/DL (ref 6.3–8)
TRIGLYCERIDE, FASTING: 261 MG/DL (ref 0–150)
TSH SERPL DL<=0.05 MIU/L-ACNC: 1.92 UIU/ML (ref 0.44–3.86)
VITAMIN D 25-HYDROXY: 41.7 NG/ML (ref 30–100)
WBC # BLD: 7.3 K/UL (ref 4.8–10.8)

## 2020-05-01 PROCEDURE — 93000 ELECTROCARDIOGRAM COMPLETE: CPT | Performed by: PHYSICIAN ASSISTANT

## 2020-05-01 PROCEDURE — 99212 OFFICE O/P EST SF 10 MIN: CPT | Performed by: PHYSICIAN ASSISTANT

## 2020-05-01 PROCEDURE — 3017F COLORECTAL CA SCREEN DOC REV: CPT | Performed by: PHYSICIAN ASSISTANT

## 2020-05-01 PROCEDURE — G8427 DOCREV CUR MEDS BY ELIG CLIN: HCPCS | Performed by: PHYSICIAN ASSISTANT

## 2020-05-01 PROCEDURE — G8417 CALC BMI ABV UP PARAM F/U: HCPCS | Performed by: PHYSICIAN ASSISTANT

## 2020-05-01 PROCEDURE — 4004F PT TOBACCO SCREEN RCVD TLK: CPT | Performed by: PHYSICIAN ASSISTANT

## 2020-05-01 RX ORDER — IBUPROFEN 200 MG
800 TABLET ORAL EVERY 6 HOURS PRN
Status: ON HOLD | COMMUNITY
End: 2020-05-05 | Stop reason: SDUPTHER

## 2020-05-01 ASSESSMENT — ENCOUNTER SYMPTOMS
COLOR CHANGE: 0
DIARRHEA: 0
VOMITING: 0
WHEEZING: 0
CONSTIPATION: 0
BACK PAIN: 0
NAUSEA: 0
SHORTNESS OF BREATH: 0
STRIDOR: 0

## 2020-05-01 NOTE — PROGRESS NOTES
Never Used   Substance and Sexual Activity    Alcohol use: Yes     Comment: occasionally    Drug use: Never    Sexual activity: Not on file   Lifestyle    Physical activity     Days per week: Not on file     Minutes per session: Not on file    Stress: Not on file   Relationships    Social connections     Talks on phone: Not on file     Gets together: Not on file     Attends [employer]ze service: Not on file     Active member of club or organization: Not on file     Attends meetings of clubs or organizations: Not on file     Relationship status: Not on file    Intimate partner violence     Fear of current or ex partner: Not on file     Emotionally abused: Not on file     Physically abused: Not on file     Forced sexual activity: Not on file   Other Topics Concern    Not on file   Social History Narrative    Not on file       Family History:       Problem Relation Age of Onset    Diabetes Mother     Diabetes Father     Heart Attack Father          Review of Systems   Constitutional: Negative for appetite change and fever. Respiratory: Negative for shortness of breath, wheezing and stridor. Cardiovascular: Negative for chest pain and palpitations. Gastrointestinal: Negative for constipation, diarrhea, nausea and vomiting. Musculoskeletal: Negative for arthralgias, back pain, joint swelling, myalgias and neck pain. Skin: Negative for color change and rash. Neurological: Negative for dizziness, speech difficulty, weakness and light-headedness. Objective: There were no vitals taken for this visit. Physical Exam  Constitutional:       General: She is not in acute distress. Appearance: Normal appearance. She is not ill-appearing. HENT:      Head: Normocephalic. Nose: Nose normal. No congestion or rhinorrhea. Mouth/Throat:      Mouth: Mucous membranes are moist.      Pharynx: Oropharynx is clear. No oropharyngeal exudate or posterior oropharyngeal erythema.    Eyes:

## 2020-05-02 DIAGNOSIS — Z01.818 ENCOUNTER FOR PREADMISSION TESTING: ICD-10-CM

## 2020-05-02 LAB
ABO/RH: NORMAL
ANTIBODY SCREEN: NORMAL

## 2020-05-02 PROCEDURE — U0003 INFECTIOUS AGENT DETECTION BY NUCLEIC ACID (DNA OR RNA); SEVERE ACUTE RESPIRATORY SYNDROME CORONAVIRUS 2 (SARS-COV-2) (CORONAVIRUS DISEASE [COVID-19]), AMPLIFIED PROBE TECHNIQUE, MAKING USE OF HIGH THROUGHPUT TECHNOLOGIES AS DESCRIBED BY CMS-2020-01-R: HCPCS

## 2020-05-03 LAB
ANA IGG, ELISA: DETECTED
CCP IGG ANTIBODIES: 4 UNITS (ref 0–19)

## 2020-05-04 LAB
ANA PATTERN: ABNORMAL
ANA TITER: ABNORMAL
ANTINUCLEAR AB INTERPRETIVE COMMENT: ABNORMAL
ANTINUCLEAR ANTIBODY, HEP-2, IGG: DETECTED
SARS-COV-2: NOT DETECTED
SOURCE: NORMAL

## 2020-05-05 ENCOUNTER — ANESTHESIA EVENT (OUTPATIENT)
Dept: OPERATING ROOM | Age: 60
End: 2020-05-05
Payer: COMMERCIAL

## 2020-05-05 ENCOUNTER — HOSPITAL ENCOUNTER (OUTPATIENT)
Age: 60
Setting detail: OUTPATIENT SURGERY
Discharge: HOME | End: 2020-05-05
Attending: ORTHOPAEDIC SURGERY | Admitting: ORTHOPAEDIC SURGERY
Payer: COMMERCIAL

## 2020-05-05 ENCOUNTER — ANESTHESIA (OUTPATIENT)
Dept: OPERATING ROOM | Age: 60
End: 2020-05-05
Payer: COMMERCIAL

## 2020-05-05 VITALS
BODY MASS INDEX: 37.76 KG/M2 | WEIGHT: 200 LBS | HEIGHT: 61 IN | HEART RATE: 69 BPM | RESPIRATION RATE: 16 BRPM | OXYGEN SATURATION: 93 % | TEMPERATURE: 96.8 F | DIASTOLIC BLOOD PRESSURE: 69 MMHG | SYSTOLIC BLOOD PRESSURE: 140 MMHG

## 2020-05-05 VITALS — OXYGEN SATURATION: 98 % | DIASTOLIC BLOOD PRESSURE: 63 MMHG | SYSTOLIC BLOOD PRESSURE: 115 MMHG

## 2020-05-05 LAB
ANTI JO-1 IGG: 1 AU/ML (ref 0–40)
DOUBLE STRANDED DNA AB, IGG: NORMAL
ENA TO RNP ANTIBODY: 18 AU/ML (ref 0–40)
ENA TO SMITH (SM) ANTIBODY: 2 AU/ML (ref 0–40)
ENA TO SSB (LA) ANTIBODY: 13 AU/ML (ref 0–40)
GLUCOSE BLD-MCNC: 115 MG/DL (ref 60–115)
GLUCOSE BLD-MCNC: 73 MG/DL (ref 60–115)
PERFORMED ON: NORMAL
PERFORMED ON: NORMAL
SCLERODERMA (SCL-70) AB: 9 AU/ML (ref 0–40)
SSA 52 (RO) (ENA) AB, IGG: 8 AU/ML (ref 0–40)
SSA 60 (RO) (ENA) AB, IGG: 32 AU/ML (ref 0–40)

## 2020-05-05 PROCEDURE — 2580000003 HC RX 258: Performed by: ANESTHESIOLOGY

## 2020-05-05 PROCEDURE — 7100000001 HC PACU RECOVERY - ADDTL 15 MIN: Performed by: ORTHOPAEDIC SURGERY

## 2020-05-05 PROCEDURE — 6360000002 HC RX W HCPCS: Performed by: NURSE ANESTHETIST, CERTIFIED REGISTERED

## 2020-05-05 PROCEDURE — 26145 TENDON EXCISION PALM/FINGER: CPT | Performed by: ORTHOPAEDIC SURGERY

## 2020-05-05 PROCEDURE — 2580000003 HC RX 258: Performed by: ORTHOPAEDIC SURGERY

## 2020-05-05 PROCEDURE — 26055 INCISE FINGER TENDON SHEATH: CPT | Performed by: ORTHOPAEDIC SURGERY

## 2020-05-05 PROCEDURE — 2580000003 HC RX 258

## 2020-05-05 PROCEDURE — 7100000000 HC PACU RECOVERY - FIRST 15 MIN: Performed by: ORTHOPAEDIC SURGERY

## 2020-05-05 PROCEDURE — 3700000001 HC ADD 15 MINUTES (ANESTHESIA): Performed by: ORTHOPAEDIC SURGERY

## 2020-05-05 PROCEDURE — 2500000003 HC RX 250 WO HCPCS: Performed by: NURSE ANESTHETIST, CERTIFIED REGISTERED

## 2020-05-05 PROCEDURE — 7100000011 HC PHASE II RECOVERY - ADDTL 15 MIN: Performed by: ORTHOPAEDIC SURGERY

## 2020-05-05 PROCEDURE — 2709999900 HC NON-CHARGEABLE SUPPLY: Performed by: ORTHOPAEDIC SURGERY

## 2020-05-05 PROCEDURE — 3600000013 HC SURGERY LEVEL 3 ADDTL 15MIN: Performed by: ORTHOPAEDIC SURGERY

## 2020-05-05 PROCEDURE — 2500000003 HC RX 250 WO HCPCS: Performed by: ORTHOPAEDIC SURGERY

## 2020-05-05 PROCEDURE — 3700000000 HC ANESTHESIA ATTENDED CARE: Performed by: ORTHOPAEDIC SURGERY

## 2020-05-05 PROCEDURE — 6360000002 HC RX W HCPCS: Performed by: ORTHOPAEDIC SURGERY

## 2020-05-05 PROCEDURE — 3600000003 HC SURGERY LEVEL 3 BASE: Performed by: ORTHOPAEDIC SURGERY

## 2020-05-05 PROCEDURE — 7100000010 HC PHASE II RECOVERY - FIRST 15 MIN: Performed by: ORTHOPAEDIC SURGERY

## 2020-05-05 RX ORDER — SODIUM CHLORIDE 0.9 % (FLUSH) 0.9 %
10 SYRINGE (ML) INJECTION PRN
Status: DISCONTINUED | OUTPATIENT
Start: 2020-05-05 | End: 2020-05-05 | Stop reason: HOSPADM

## 2020-05-05 RX ORDER — IBUPROFEN 200 MG
400 TABLET ORAL EVERY 6 HOURS PRN
Qty: 120 TABLET | Refills: 3 | Status: SHIPPED | OUTPATIENT
Start: 2020-05-05 | End: 2020-06-23

## 2020-05-05 RX ORDER — PROMETHAZINE HYDROCHLORIDE 12.5 MG/1
12.5 TABLET ORAL EVERY 6 HOURS PRN
Status: DISCONTINUED | OUTPATIENT
Start: 2020-05-05 | End: 2020-05-05 | Stop reason: HOSPADM

## 2020-05-05 RX ORDER — MEPERIDINE HYDROCHLORIDE 25 MG/ML
12.5 INJECTION INTRAMUSCULAR; INTRAVENOUS; SUBCUTANEOUS EVERY 5 MIN PRN
Status: DISCONTINUED | OUTPATIENT
Start: 2020-05-05 | End: 2020-05-05 | Stop reason: HOSPADM

## 2020-05-05 RX ORDER — MAGNESIUM HYDROXIDE 1200 MG/15ML
LIQUID ORAL CONTINUOUS PRN
Status: COMPLETED | OUTPATIENT
Start: 2020-05-05 | End: 2020-05-05

## 2020-05-05 RX ORDER — MIDAZOLAM HYDROCHLORIDE 1 MG/ML
INJECTION INTRAMUSCULAR; INTRAVENOUS PRN
Status: DISCONTINUED | OUTPATIENT
Start: 2020-05-05 | End: 2020-05-05 | Stop reason: SDUPTHER

## 2020-05-05 RX ORDER — PROPOFOL 10 MG/ML
INJECTION, EMULSION INTRAVENOUS CONTINUOUS PRN
Status: DISCONTINUED | OUTPATIENT
Start: 2020-05-05 | End: 2020-05-05 | Stop reason: SDUPTHER

## 2020-05-05 RX ORDER — LIDOCAINE HYDROCHLORIDE 20 MG/ML
INJECTION, SOLUTION INTRAVENOUS PRN
Status: DISCONTINUED | OUTPATIENT
Start: 2020-05-05 | End: 2020-05-05 | Stop reason: SDUPTHER

## 2020-05-05 RX ORDER — TRAMADOL HYDROCHLORIDE 50 MG/1
50 TABLET ORAL EVERY 8 HOURS PRN
Qty: 18 TABLET | Refills: 0 | Status: SHIPPED | OUTPATIENT
Start: 2020-05-05 | End: 2020-05-12

## 2020-05-05 RX ORDER — BUPIVACAINE HYDROCHLORIDE 2.5 MG/ML
INJECTION, SOLUTION EPIDURAL; INFILTRATION; INTRACAUDAL PRN
Status: DISCONTINUED | OUTPATIENT
Start: 2020-05-05 | End: 2020-05-05 | Stop reason: ALTCHOICE

## 2020-05-05 RX ORDER — SODIUM CHLORIDE 0.9 % (FLUSH) 0.9 %
10 SYRINGE (ML) INJECTION EVERY 12 HOURS SCHEDULED
Status: DISCONTINUED | OUTPATIENT
Start: 2020-05-05 | End: 2020-05-05 | Stop reason: HOSPADM

## 2020-05-05 RX ORDER — ACETAMINOPHEN 500 MG
1000 TABLET ORAL 3 TIMES DAILY
Qty: 180 TABLET | Refills: 0 | Status: SHIPPED | OUTPATIENT
Start: 2020-05-05

## 2020-05-05 RX ORDER — HYDROCODONE BITARTRATE AND ACETAMINOPHEN 5; 325 MG/1; MG/1
1 TABLET ORAL PRN
Status: DISCONTINUED | OUTPATIENT
Start: 2020-05-05 | End: 2020-05-05 | Stop reason: HOSPADM

## 2020-05-05 RX ORDER — HYDROCODONE BITARTRATE AND ACETAMINOPHEN 5; 325 MG/1; MG/1
2 TABLET ORAL PRN
Status: DISCONTINUED | OUTPATIENT
Start: 2020-05-05 | End: 2020-05-05 | Stop reason: HOSPADM

## 2020-05-05 RX ORDER — ONDANSETRON 2 MG/ML
4 INJECTION INTRAMUSCULAR; INTRAVENOUS EVERY 6 HOURS PRN
Status: DISCONTINUED | OUTPATIENT
Start: 2020-05-05 | End: 2020-05-05 | Stop reason: HOSPADM

## 2020-05-05 RX ORDER — ONDANSETRON 2 MG/ML
4 INJECTION INTRAMUSCULAR; INTRAVENOUS
Status: DISCONTINUED | OUTPATIENT
Start: 2020-05-05 | End: 2020-05-05 | Stop reason: HOSPADM

## 2020-05-05 RX ORDER — SODIUM CHLORIDE, SODIUM LACTATE, POTASSIUM CHLORIDE, CALCIUM CHLORIDE 600; 310; 30; 20 MG/100ML; MG/100ML; MG/100ML; MG/100ML
INJECTION, SOLUTION INTRAVENOUS
Status: COMPLETED
Start: 2020-05-05 | End: 2020-05-05

## 2020-05-05 RX ORDER — LIDOCAINE HYDROCHLORIDE 10 MG/ML
1 INJECTION, SOLUTION EPIDURAL; INFILTRATION; INTRACAUDAL; PERINEURAL
Status: DISCONTINUED | OUTPATIENT
Start: 2020-05-05 | End: 2020-05-05 | Stop reason: HOSPADM

## 2020-05-05 RX ORDER — LIDOCAINE HYDROCHLORIDE 5 MG/ML
INJECTION, SOLUTION INFILTRATION; INTRAVENOUS PRN
Status: DISCONTINUED | OUTPATIENT
Start: 2020-05-05 | End: 2020-05-05 | Stop reason: SDUPTHER

## 2020-05-05 RX ORDER — METOCLOPRAMIDE HYDROCHLORIDE 5 MG/ML
10 INJECTION INTRAMUSCULAR; INTRAVENOUS
Status: DISCONTINUED | OUTPATIENT
Start: 2020-05-05 | End: 2020-05-05 | Stop reason: HOSPADM

## 2020-05-05 RX ORDER — SODIUM CHLORIDE, SODIUM LACTATE, POTASSIUM CHLORIDE, CALCIUM CHLORIDE 600; 310; 30; 20 MG/100ML; MG/100ML; MG/100ML; MG/100ML
INJECTION, SOLUTION INTRAVENOUS CONTINUOUS
Status: DISCONTINUED | OUTPATIENT
Start: 2020-05-05 | End: 2020-05-05 | Stop reason: HOSPADM

## 2020-05-05 RX ORDER — CEFAZOLIN SODIUM 2 G/50ML
2 SOLUTION INTRAVENOUS
Status: COMPLETED | OUTPATIENT
Start: 2020-05-05 | End: 2020-05-05

## 2020-05-05 RX ORDER — DIPHENHYDRAMINE HYDROCHLORIDE 50 MG/ML
12.5 INJECTION INTRAMUSCULAR; INTRAVENOUS
Status: DISCONTINUED | OUTPATIENT
Start: 2020-05-05 | End: 2020-05-05 | Stop reason: HOSPADM

## 2020-05-05 RX ORDER — FENTANYL CITRATE 50 UG/ML
50 INJECTION, SOLUTION INTRAMUSCULAR; INTRAVENOUS EVERY 10 MIN PRN
Status: DISCONTINUED | OUTPATIENT
Start: 2020-05-05 | End: 2020-05-05 | Stop reason: HOSPADM

## 2020-05-05 RX ADMIN — LIDOCAINE HYDROCHLORIDE 45 ML: 5 INJECTION, SOLUTION INFILTRATION at 08:57

## 2020-05-05 RX ADMIN — LIDOCAINE HYDROCHLORIDE 60 MG: 20 INJECTION, SOLUTION INTRAVENOUS at 08:50

## 2020-05-05 RX ADMIN — SODIUM CHLORIDE, POTASSIUM CHLORIDE, SODIUM LACTATE AND CALCIUM CHLORIDE: 600; 310; 30; 20 INJECTION, SOLUTION INTRAVENOUS at 06:49

## 2020-05-05 RX ADMIN — SODIUM CHLORIDE, POTASSIUM CHLORIDE, SODIUM LACTATE AND CALCIUM CHLORIDE 1000 ML: 600; 310; 30; 20 INJECTION, SOLUTION INTRAVENOUS at 07:01

## 2020-05-05 RX ADMIN — PROPOFOL 100 MCG/KG/MIN: 10 INJECTION, EMULSION INTRAVENOUS at 08:50

## 2020-05-05 RX ADMIN — CEFAZOLIN SODIUM 2 G: 2 SOLUTION INTRAVENOUS at 08:46

## 2020-05-05 RX ADMIN — MIDAZOLAM HYDROCHLORIDE 2 MG: 2 INJECTION, SOLUTION INTRAMUSCULAR; INTRAVENOUS at 08:45

## 2020-05-05 ASSESSMENT — PULMONARY FUNCTION TESTS
PIF_VALUE: 0
PIF_VALUE: 1
PIF_VALUE: 0
PIF_VALUE: 1
PIF_VALUE: 0
PIF_VALUE: 1
PIF_VALUE: 0

## 2020-05-05 ASSESSMENT — PAIN SCALES - GENERAL: PAINLEVEL_OUTOF10: 0

## 2020-05-05 NOTE — PROGRESS NOTES
Pt to PACU 0945, sleeping but arousable to voice, simple mask on with even respirations,  Ace wrap to left wrist clean and dry. accucheck @ 1000 is 73, Dr Eagle Hollis aware, no new orders.

## 2020-05-05 NOTE — OP NOTE
Operative Note      Patient: Luis Chacon  YOB: 1960  MRN: [id number]    Date of Procedure: 5/5/2020    Pre-Op Diagnosis: TRIGGER FINGER MIDDLE, RING FINGER -  left    Post-Op Diagnosis: Same       Procedure(s):  TRIGGER RELEASE MIDDLE FINGER AND RING FINGER LEFT HAND,   Partial Tenosynovectomy of the tendon sheath Middle finger only    Surgeon(s): Liliana Boudreaux MD    Assistant:   Physician Assistant: Zahira Rockwell PA-C    Anesthesia: [place] Block    Estimated Blood Loss (mL): Minimal    Complications: None    Specimens:   * No specimens in log *    Implants:  * No implants in log *      Drains: * No LDAs found *    Findings: Tight compression of the flexor tendon sheath with inflammation and thickening of the tenosynovium in the middle finger, at the level of the A1 pulley    Detailed Description of Procedure: Indications for surgery    Aggie Gillespie is a 64-year-old lady who is presented with symptoms of triggering involving the ring and middle finger, she recently had a cortisone injection in the ring finger, the symptoms had persisted, she had tried conservative measures and these had failed. Risks and benefits of surgical intervention have been discussed. Risk of anesthesia which is a Biers block, risk of injury to the vessel, nerve, tendon. Postoperative complications such as hematoma formation, infection, neuroma formation, recurrence of the symptoms. The need for physical therapy have all been discussed. The patient consents for surgical procedure    Operative note    Patient was brought to the operating room and a Biers block was given by the anesthetic services  The patient had 2 g of Ancef given prior to the start of the procedure, the left upper extremity was prepped and draped with the chlorhexidine.   Timeout was called prior to the procedure  Surgical incision sites were marked at the level of the A1 pulley to the ring and the middle finger, the ring finger was Jessy Dobbins MD on 5/5/2020 at 10:01 AM

## 2020-05-05 NOTE — ANESTHESIA POSTPROCEDURE EVALUATION
Department of Anesthesiology  Postprocedure Note    Patient: Jason Brown  MRN: [id number]  YOB: 1960  Date of evaluation: 5/5/2020  Time:  9:47 AM     Procedure Summary     Date:  05/05/20 Room / Location:  [address]    Anesthesia Start:  6797 Anesthesia Stop:      Procedure:  TRIGGER RELEASE MIDDLE FINGER AND RING FINGER LEFT HAND (Left Hand) Diagnosis:  (TRIGGER FINGER MIDDLE FINGER)    Surgeon:  Kayley Beltran MD Responsible Provider:  OG Worrell CRNA    Anesthesia Type:  [place] block ASA Status:  3          Anesthesia Type: [place] block    Freedom Phase I:      Freedom Phase II:      Last vitals: Reviewed and per EMR flowsheets.        Anesthesia Post Evaluation    Patient location during evaluation: PACU  Patient participation: complete - patient participated  Level of consciousness: awake and alert  Pain score: 0  Airway patency: patent  Nausea & Vomiting: no nausea and no vomiting  Complications: no  Cardiovascular status: blood pressure returned to baseline and hemodynamically stable  Respiratory status: acceptable, face mask and spontaneous ventilation  Hydration status: euvolemic

## 2020-05-05 NOTE — ANESTHESIA PROCEDURE NOTES
Peripheral Block    Patient location during procedure: OR  Start time: 5/5/2020 8:56 AM  End time: 5/5/2020 9:39 AM  Staffing  Resident/CRNA: OG Worrell CRNA  Performed: resident/CRNA   Preanesthetic Checklist  Completed: patient identified, site marked, surgical consent, pre-op evaluation, timeout performed, IV checked, risks and benefits discussed, monitors and equipment checked, anesthesia consent given, oxygen available and patient being monitored  Peripheral Block  Patient position: supine  Prep: alcohol swabs  Patient monitoring: continuous pulse ox, cardiac monitor, continuous capnometry, frequent blood pressure checks and IV access  Block type: [place] block  Laterality: left  Injection technique: single-shot  Procedures: other  Infiltration strength: 0.5 %  Dose: 45 mL  Provider prep: mask  Assessment  Slow fractionated injection: yes  Hemodynamics: stable  Additional Notes  Left upper arm tourniquet applied. Left forearm exsanguinated with esmark, tourniquet up @ 0856 to 250mmHG. Negative radial pulse.   45 mL 0.5% PF Lidocaine injected, negative s/s toxicity  Reason for block: primary anesthetic

## 2020-05-05 NOTE — PROGRESS NOTES
1057  Discharge instructions reviewed with patient. 36  Script given to patient for Ultram.  To private car via ambulation.

## 2020-05-08 ENCOUNTER — TELEMEDICINE (OUTPATIENT)
Dept: FAMILY MEDICINE CLINIC | Age: 60
End: 2020-05-08
Payer: COMMERCIAL

## 2020-05-08 PROCEDURE — 3052F HG A1C>EQUAL 8.0%<EQUAL 9.0%: CPT | Performed by: FAMILY MEDICINE

## 2020-05-08 PROCEDURE — 2022F DILAT RTA XM EVC RTNOPTHY: CPT | Performed by: FAMILY MEDICINE

## 2020-05-08 PROCEDURE — 3017F COLORECTAL CA SCREEN DOC REV: CPT | Performed by: FAMILY MEDICINE

## 2020-05-08 PROCEDURE — 99214 OFFICE O/P EST MOD 30 MIN: CPT | Performed by: FAMILY MEDICINE

## 2020-05-08 PROCEDURE — G8427 DOCREV CUR MEDS BY ELIG CLIN: HCPCS | Performed by: FAMILY MEDICINE

## 2020-05-08 NOTE — PROGRESS NOTES
2020    TELEHEALTH EVALUATION -- Audio[facility]Visual [facility])    HPI:    Morris Mendenhall (:  1960) has requested an audio/video evaluation for the following concern(s):    Patient is here for DM f/u. Sugars have been moderately well-controlled and patient has not been experiencing hyper- or hypoglycemic symptoms. Compliance with meds is good and there are no side effects. Patient exercises occasionally and tries to eat healthy. With an A1c has gone from 10.7 in 2000 19-8. 6. Vitamin D deficiency. 2019 level was 20.5 and now is 45. Has chronic musculoskeletal pain consistent with spondyloarthropathy versus mixed tissue disorder versus rheumatoid arthritis. C-reactive protein elevated at 29.6. SOPHIA positive at 1: 160, speckled pattern. Sedimentation rate 61. Rheumatoid factor negative. Recently had surgical release of trigger fingers. Review of Systems    Prior to Visit Medications    Medication Sig Taking? Authorizing Provider   acetaminophen (TYLENOL) 500 MG tablet Take 2 tablets by mouth 3 times daily  Dar Bello PA-C   ibuprofen (ADVIL;MOTRIN) 200 MG tablet Take 2 tablets by mouth every 6 hours as needed for Pain  Josie Mojica PA-C   traMADol (ULTRAM) 50 MG tablet Take 1 tablet by mouth every 8 hours as needed for Pain for up to 7 days. Intended supply: 3 days.  Take lowest dose possible to manage pain  Lena Andrea MD   atenolol (TENORMIN) 25 MG tablet TAKE 1 TABLET BY MOUTH ONCE DAILY  Garrett Martinez MD   BASAGLAR KWIKPEN 100 UNIT/ML injection pen INJECT 55 UNITS SUBCUTANEOUSLY ONCE DAILY  Garrett Martinez MD   NOVOLOG FLEXPEN 100 UNIT/ML injection pen INJECT 8 UNITS SUBCUTANEOUSLY THREE TIMES DAILY BEFORE MEAL(S)  Garrett Martinez MD   pregabalin (LYRICA) 100 MG capsule TAKE 1 CAPSULE BY MOUTH TWICE DAILY FOR 80 DAYS  Garrett Martinez MD   DULoxetine (CYMBALTA) 60 MG by a Virtual Visit (video visit) encounter to address concerns as mentioned above. A caregiver was present when appropriate. Due to this being a TeleHealth encounter (During DSJEB-34 public health emergency), evaluation of the following organ systems was limited: Vitals/Constitutional/EENT/Resp/CV/GI//MS/Neuro/Skin/Heme-Lymph-Imm. Pursuant to the emergency declaration under the [address] [place] [address] and the [employer] Act, this Virtual Visit was conducted with patient's (and/or legal guardian's) consent, to reduce the patient's risk of exposure to COVID-19 and provide necessary medical care. The patient (and/or legal guardian) has also been advised to contact this office for worsening conditions or problems, and seek emergency medical treatment and/or call 911 if deemed necessary. Patient identification was verified at the start of the visit: Yes    Total time spent on this encounter: Not billed by time    Services were provided through a video synchronous discussion virtually to substitute for in-person clinic visit. Patient and provider were located at their individual homes. --Maryan Lucero MD on 5/8/2020 at 3:11 PM    An electronic signature was used to authenticate this note.

## 2020-05-10 PROBLEM — E55.9 VITAMIN D DEFICIENCY: Status: ACTIVE | Noted: 2020-05-10

## 2020-05-19 ENCOUNTER — OFFICE VISIT (OUTPATIENT)
Dept: ORTHOPEDIC SURGERY | Age: 60
End: 2020-05-19

## 2020-05-19 VITALS — HEART RATE: 74 BPM | OXYGEN SATURATION: 98 %

## 2020-05-19 PROCEDURE — 99024 POSTOP FOLLOW-UP VISIT: CPT | Performed by: PHYSICIAN ASSISTANT

## 2020-05-19 ASSESSMENT — ENCOUNTER SYMPTOMS
CONSTIPATION: 0
DIARRHEA: 0
BACK PAIN: 0
STRIDOR: 0
NAUSEA: 0
WHEEZING: 0
COLOR CHANGE: 0
SHORTNESS OF BREATH: 0
VOMITING: 0

## 2020-05-19 NOTE — PROGRESS NOTES
1 CAPSULE BY MOUTH ONCE DAILY      clotrimazole-betamethasone (LOTRISONE) 1-0.05 % cream Apply topically      Blood Glucose Monitoring Suppl (TRUE METRIX AIR GLUCOSE METER) w/Device KIT USE TO CHECK GLUCOSE 4 TIMES DAILY      hydrOXYzine (VISTARIL) 25 MG capsule hydroxyzine pamoate 25 mg capsule      ferrous sulfate 325 (65 Fe) MG tablet Take 325 mg by mouth daily (with breakfast)      DHEA 50 MG TABS Take 1 tablet by mouth daily       No current facility-administered medications on file prior to visit. Review of Systems   Constitutional: Negative for appetite change and fever. Respiratory: Negative for shortness of breath, wheezing and stridor. Cardiovascular: Negative for chest pain and palpitations. Gastrointestinal: Negative for constipation, diarrhea, nausea and vomiting. Musculoskeletal: Negative for arthralgias, back pain, joint swelling, myalgias and neck pain. Skin: Negative for color change and rash. Neurological: Negative for dizziness, speech difficulty, weakness and light-headedness. Objective: There were no vitals taken for this visit. General: WDWN, well appearing, in no distress   Skin: without breakdown, rash, normal in color    Right Hand Exam     Tenderness   The patient is experiencing no tenderness. Range of Motion   The patient has normal right wrist ROM. Muscle Strength   The patient has normal right wrist strength. Tests   Phalens Sign: negative  Tinel's sign (median nerve): negative  Finkelstein's test: negative    Other   Erythema: absent  Scars: absent  Sensation: normal  Pulse: present    Comments:  Well-appearing wound      Left Hand Exam   Left hand exam is normal.    Tenderness   The patient is experiencing no tenderness. Range of Motion   The patient has normal left wrist ROM. Muscle Strength   The patient has normal left wrist strength.     Tests   Phalens Sign: negative  Tinel's sign (median nerve): negative  Finkelstein's

## 2020-05-31 PROBLEM — Z01.818 PRE-OP EXAMINATION: Status: RESOLVED | Noted: 2020-05-01 | Resolved: 2020-05-31

## 2020-06-02 ENCOUNTER — OFFICE VISIT (OUTPATIENT)
Dept: ORTHOPEDIC SURGERY | Age: 60
End: 2020-06-02
Payer: COMMERCIAL

## 2020-06-02 VITALS
BODY MASS INDEX: 38.14 KG/M2 | OXYGEN SATURATION: 98 % | HEART RATE: 90 BPM | TEMPERATURE: 96.5 F | WEIGHT: 202 LBS | HEIGHT: 61 IN

## 2020-06-02 PROCEDURE — G8417 CALC BMI ABV UP PARAM F/U: HCPCS | Performed by: PHYSICIAN ASSISTANT

## 2020-06-02 PROCEDURE — 99213 OFFICE O/P EST LOW 20 MIN: CPT | Performed by: PHYSICIAN ASSISTANT

## 2020-06-02 PROCEDURE — 4004F PT TOBACCO SCREEN RCVD TLK: CPT | Performed by: PHYSICIAN ASSISTANT

## 2020-06-02 PROCEDURE — 20550 NJX 1 TENDON SHEATH/LIGAMENT: CPT | Performed by: PHYSICIAN ASSISTANT

## 2020-06-02 PROCEDURE — G8427 DOCREV CUR MEDS BY ELIG CLIN: HCPCS | Performed by: PHYSICIAN ASSISTANT

## 2020-06-02 PROCEDURE — 3017F COLORECTAL CA SCREEN DOC REV: CPT | Performed by: PHYSICIAN ASSISTANT

## 2020-06-02 RX ORDER — BETAMETHASONE SODIUM PHOSPHATE AND BETAMETHASONE ACETATE 3; 3 MG/ML; MG/ML
3 INJECTION, SUSPENSION INTRA-ARTICULAR; INTRALESIONAL; INTRAMUSCULAR; SOFT TISSUE ONCE
Status: COMPLETED | OUTPATIENT
Start: 2020-06-02 | End: 2020-06-02

## 2020-06-02 RX ORDER — LIDOCAINE HYDROCHLORIDE 10 MG/ML
5 INJECTION, SOLUTION INFILTRATION; PERINEURAL ONCE
Status: COMPLETED | OUTPATIENT
Start: 2020-06-02 | End: 2020-06-02

## 2020-06-02 RX ADMIN — LIDOCAINE HYDROCHLORIDE 2 ML: 10 INJECTION, SOLUTION INFILTRATION; PERINEURAL at 17:53

## 2020-06-02 RX ADMIN — BETAMETHASONE SODIUM PHOSPHATE AND BETAMETHASONE ACETATE 0.5 MG: 3; 3 INJECTION, SUSPENSION INTRA-ARTICULAR; INTRALESIONAL; INTRAMUSCULAR; SOFT TISSUE at 17:52

## 2020-06-02 ASSESSMENT — ENCOUNTER SYMPTOMS
CHEST TIGHTNESS: 0
ABDOMINAL DISTENTION: 0

## 2020-06-02 NOTE — PROGRESS NOTES
Subjective:      Patient ID: Morris Mendenhall is a 62 y.o. female who presents today for:  Chief Complaint   Patient presents with    Post-Op Check     surgery done 2020, 2nd post-op visit, pt would like to discuss cortasone injection for right hand         HPI  Patient has been having triggering in her right hand ring finger for many years  This is causing increasing discomfort  Has had the surgery on her left hand and is pleased with the results though she noticed some pain on the dorsal aspect of the finger      Past Medical History:   Diagnosis Date    Arthritis     Bulging lumbar disc     lower back    Diabetes mellitus ([address]     Fibromyalgia      Past Surgical History:   Procedure Laterality Date     SECTION      [id number],    FINGER TRIGGER RELEASE Left 2020    TRIGGER RELEASE MIDDLE FINGER AND RING FINGER LEFT HAND performed by Lena Andrea MD at [facility]    kidney stone removal      Social History     Socioeconomic History    Marital status:      Spouse name: Not on file    Number of children: Not on file    Years of education: Not on file    Highest education level: Not on file   Occupational History    Not on file   Social Needs    Financial resource strain: Not on file    Food insecurity     Worry: Not on file     Inability: Not on file    Transportation needs     Medical: Not on file     Non-medical: Not on file   Tobacco Use    Smoking status: Current Every Day Smoker     Packs/day: 1.50     Types: Cigarettes     Start date: 1976    Smokeless tobacco: Never Used   Substance and Sexual Activity    Alcohol use: Yes     Comment: occasionally    Drug use: Never    Sexual activity: Not on file   Lifestyle    Physical activity     Days per week: Not on file     Minutes per session: Not on file    Stress: Not on file   Relationships    Social connections     Talks on phone: Not on file     Gets together: Not on file     Attends [employer] service: Not on file     Active member of club or organization: Not on file     Attends meetings of clubs or organizations: Not on file     Relationship status: Not on file    Intimate partner violence     Fear of current or ex partner: Not on file     Emotionally abused: Not on file     Physically abused: Not on file     Forced sexual activity: Not on file   Other Topics Concern    Not on file   Social History Narrative    Not on file     Family History   Problem Relation Age of Onset    Diabetes Mother     Diabetes Father     Heart Attack Father      Allergies   Allergen Reactions    Bactrim [Sulfamethoxazole-Trimethoprim]     Oxycodone      Patient reports she \"was addicted to it in the past\".  Statins      Current Outpatient Medications on File Prior to Visit   Medication Sig Dispense Refill    acetaminophen (TYLENOL) 500 MG tablet Take 2 tablets by mouth 3 times daily 180 tablet 0    ibuprofen (ADVIL;MOTRIN) 200 MG tablet Take 2 tablets by mouth every 6 hours as needed for Pain 120 tablet 3    atenolol (TENORMIN) 25 MG tablet TAKE 1 TABLET BY MOUTH ONCE DAILY 90 tablet 1    BASAGLAR KWIKPEN 100 UNIT/ML injection pen INJECT 55 UNITS SUBCUTANEOUSLY ONCE DAILY 15 pen 1    NOVOLOG FLEXPEN 100 UNIT/ML injection pen INJECT 8 UNITS SUBCUTANEOUSLY THREE TIMES DAILY BEFORE MEAL(S) 15 pen 1    pregabalin (LYRICA) 100 MG capsule TAKE 1 CAPSULE BY MOUTH TWICE DAILY FOR 90 DAYS 180 capsule 0    DULoxetine (CYMBALTA) 60 MG extended release capsule Take 1 capsule by mouth 2 times daily 180 capsule 0    zolpidem (AMBIEN) 10 MG tablet Take 10 mg by mouth. Take when have trouble to sleep.       metFORMIN (GLUCOPHAGE) 1000 MG tablet TAKE 1 TABLET BY MOUTH TWICE DAILY WITH MEALS      TRUEplus Lancets 33G MISC USE 1 TO CHECK GLUCOSE 4 TIMES DAILY      BD PEN NEEDLE STEFFANIE U/F 32G X 4 MM MISC USE 1 TO INJECT INSULIN 4 TIMES DAILY      TRUE METRIX BLOOD GLUCOSE TEST strip USE 1 STRIP

## 2020-06-23 ENCOUNTER — APPOINTMENT (OUTPATIENT)
Dept: GENERAL RADIOLOGY | Age: 60
End: 2020-06-23
Payer: COMMERCIAL

## 2020-06-23 ENCOUNTER — HOSPITAL ENCOUNTER (EMERGENCY)
Age: 60
Discharge: HOME | End: 2020-06-23
Payer: COMMERCIAL

## 2020-06-23 VITALS
HEIGHT: 61 IN | HEART RATE: 83 BPM | RESPIRATION RATE: 18 BRPM | WEIGHT: 200 LBS | TEMPERATURE: 98.3 F | SYSTOLIC BLOOD PRESSURE: 152 MMHG | BODY MASS INDEX: 37.76 KG/M2 | DIASTOLIC BLOOD PRESSURE: 92 MMHG | OXYGEN SATURATION: 98 %

## 2020-06-23 PROCEDURE — 71046 X-RAY EXAM CHEST 2 VIEWS: CPT

## 2020-06-23 PROCEDURE — 6370000000 HC RX 637 (ALT 250 FOR IP): Performed by: PHYSICIAN ASSISTANT

## 2020-06-23 PROCEDURE — 71101 X-RAY EXAM UNILAT RIBS/CHEST: CPT

## 2020-06-23 PROCEDURE — 99283 EMERGENCY DEPT VISIT LOW MDM: CPT

## 2020-06-23 RX ORDER — NAPROXEN 500 MG/1
500 TABLET ORAL ONCE
Status: COMPLETED | OUTPATIENT
Start: 2020-06-23 | End: 2020-06-23

## 2020-06-23 RX ORDER — CYCLOBENZAPRINE HCL 10 MG
10 TABLET ORAL ONCE
Status: COMPLETED | OUTPATIENT
Start: 2020-06-23 | End: 2020-06-23

## 2020-06-23 RX ORDER — CAPSAICIN 0.025 %
CREAM (GRAM) TOPICAL
Qty: 1 TUBE | Refills: 0 | Status: SHIPPED | OUTPATIENT
Start: 2020-06-23 | End: 2020-07-23

## 2020-06-23 RX ORDER — CYCLOBENZAPRINE HCL 10 MG
10 TABLET ORAL 2 TIMES DAILY PRN
Qty: 14 TABLET | Refills: 0 | Status: SHIPPED | OUTPATIENT
Start: 2020-06-23 | End: 2020-06-30

## 2020-06-23 RX ORDER — NAPROXEN 250 MG/1
500 TABLET ORAL 2 TIMES DAILY WITH MEALS
Qty: 20 TABLET | Refills: 0 | Status: SHIPPED | OUTPATIENT
Start: 2020-06-23 | End: 2020-10-27

## 2020-06-23 RX ADMIN — NAPROXEN 500 MG: 500 TABLET ORAL at 18:07

## 2020-06-23 RX ADMIN — CYCLOBENZAPRINE HYDROCHLORIDE 10 MG: 10 TABLET, FILM COATED ORAL at 18:07

## 2020-06-23 ASSESSMENT — ENCOUNTER SYMPTOMS
RESPIRATORY NEGATIVE: 1
EYES NEGATIVE: 1
GASTROINTESTINAL NEGATIVE: 1

## 2020-06-23 ASSESSMENT — PAIN SCALES - GENERAL
PAINLEVEL_OUTOF10: 8
PAINLEVEL_OUTOF10: 8

## 2020-06-23 ASSESSMENT — PAIN DESCRIPTION - PAIN TYPE: TYPE: ACUTE PAIN

## 2020-06-23 NOTE — ED TRIAGE NOTES
Pt lungs clear bilat skin warm pink and dry. Pt speaking in full sentences pt states she thinks she may have broke a rib after falling out of bed and stretching over the table a couple days ago. Pt states this has happened before.  Pt states pain increases with movement but denies any pain on cough or respiration

## 2020-06-23 NOTE — ED PROVIDER NOTES
[facility] ED  eMERGENCY dEPARTMENT eNCOUnter      Pt Name: Luis Chacon  MRN: [id number]  [place] 1960  Date of evaluation: 2020  Provider: Crystal Desai PA-C      HISTORY OF PRESENT ILLNESS    Luis Chacon is a 62 y.o. female who presents to the Emergency Department with chief complaint of left rib pain. Patient states she fell out of bed yesterday and developed left side pain. Patient denies any shortness of breath or chest pain. Patient states symptoms are worse with movement and touch. Patient states she has been taking Tylenol and Motrin on occasion as needed for pain. She denies any head injury and has no other concerns at this time. REVIEW OF SYSTEMS       Review of Systems   Constitutional: Negative. HENT: Negative. Eyes: Negative. Respiratory: Negative. Cardiovascular: Negative. Gastrointestinal: Negative. Endocrine: Negative. Genitourinary: Negative. Musculoskeletal: Negative. Left ribs   Skin: Negative. Neurological: Negative. Psychiatric/Behavioral: Negative.           PAST MEDICAL HISTORY     Past Medical History:   Diagnosis Date    Arthritis     Bulging lumbar disc     lower back    [address] mellitus [address] [address] [facility]     Fibromyalgia          SURGICAL HISTORY       Past Surgical History:   Procedure Laterality Date     SECTION      [id number],    FINGER TRIGGER RELEASE Left 2020    TRIGGER RELEASE MIDDLE FINGER AND RING FINGER LEFT HAND performed by Liliana Boudreaux MD at [facility]    kidney stone removal          CURRENT MEDICATIONS       Previous Medications    ACETAMINOPHEN (TYLENOL) 500 MG TABLET    Take 2 tablets by mouth 3 times daily    ATENOLOL (TENORMIN) 25 MG TABLET    TAKE 1 TABLET BY MOUTH ONCE DAILY    BASAGLAR KWIKPEN 100 UNIT/ML INJECTION PEN    INJECT 55 UNITS SUBCUTANEOUSLY ONCE DAILY    BD PEN NEEDLE STEFFANIE U/F 32G X 4 MM MISC    USE 1 TO INJECT

## 2020-07-20 ENCOUNTER — PATIENT MESSAGE (OUTPATIENT)
Dept: FAMILY MEDICINE CLINIC | Age: 60
End: 2020-07-20

## 2020-07-20 RX ORDER — PREGABALIN 100 MG/1
CAPSULE ORAL
Qty: 180 CAPSULE | Refills: 0 | Status: SHIPPED | OUTPATIENT
Start: 2020-07-20 | End: 2020-09-21 | Stop reason: SDUPTHER

## 2020-07-20 NOTE — TELEPHONE ENCOUNTER
From: Sarmad Dennis  To: Isabela Lewis MD  Sent: 7/20/2020 4:10 PM EDT  Subject: Prescription Question    I need a new prescription for pregabalin

## 2020-07-20 NOTE — TELEPHONE ENCOUNTER
Patient requesting medication refill.  Please approve or deny this request.    Rx requested:  Requested Prescriptions     Pending Prescriptions Disp Refills    pregabalin (LYRICA) 100 MG capsule 180 capsule 0     Sig: TAKE 1 CAPSULE BY MOUTH TWICE DAILY FOR 90 DAYS         Last Office Visit:   3/5/2020      Next Visit Date:  Future Appointments   Date Time Provider Taylor Tejada   8/14/2020 12:45 PM Romeo Shelton MD [address] [place] [address]

## 2020-07-29 NOTE — TELEPHONE ENCOUNTER
Patient is  requesting medication refill. Please approve or deny this request.    Rx requested:  Requested Prescriptions     Pending Prescriptions Disp Refills    pregabalin (LYRICA) 100 MG capsule 180 capsule 0     Sig: TAKE 1 CAPSULE BY MOUTH TWICE DAILY FOR 90 DAYS    atenolol (TENORMIN) 25 MG tablet 90 tablet 1     Sig: TAKE 1 TABLET BY MOUTH ONCE DAILY    BASAGLAR KWIKPEN 100 UNIT/ML injection pen 15 pen 1     Sig: INJECT 55 UNITS SUBCUTANEOUSLY ONCE DAILY    NOVOLOG FLEXPEN 100 UNIT/ML injection pen 15 pen 1     Sig: INJECT 8 UNITS SUBCUTANEOUSLY THREE TIMES DAILY BEFORE MEAL(S)    DULoxetine (CYMBALTA) 60 MG extended release capsule 180 capsule 0     Sig: Take 1 capsule by mouth 2 times daily    zolpidem (AMBIEN) 10 MG tablet       Sig: Take 1 tablet by mouth. Take when have trouble to sleep.  metFORMIN (GLUCOPHAGE) 1000 MG tablet 60 tablet     BD PEN NEEDLE STEFFANIE U/F 32G X 4 MM MISC 100 each     TRUE METRIX BLOOD GLUCOSE TEST strip 100 each      Sig: As needed.     esomeprazole (NEXIUM) 40 MG delayed release capsule 30 capsule     clotrimazole-betamethasone (LOTRISONE) 1-0.05 % cream       Sig: Apply topically    hydrOXYzine (VISTARIL) 25 MG capsule      ferrous sulfate (IRON 325) 325 (65 Fe) MG tablet 30 tablet      Sig: Take 1 tablet by mouth daily (with breakfast)    DHEA 50 MG TABS       Sig: Take 1 tablet by mouth daily         Last Office Visit:   3/5/2020      Next Visit Date:  Future Appointments   Date Time Provider Taylor Tejada   8/14/2020 12:45 PM Sandra Dunaway MD [address] [place] [address]

## 2020-07-31 RX ORDER — ZOLPIDEM TARTRATE 10 MG/1
10 TABLET ORAL
OUTPATIENT
Start: 2020-07-31

## 2020-07-31 RX ORDER — INSULIN ASPART 100 [IU]/ML
INJECTION, SOLUTION INTRAVENOUS; SUBCUTANEOUS
Qty: 15 PEN | Refills: 1 | Status: SHIPPED | OUTPATIENT
Start: 2020-07-31 | End: 2020-12-02 | Stop reason: SDUPTHER

## 2020-07-31 RX ORDER — CALCIUM CITRATE/VITAMIN D3 200MG-6.25
TABLET ORAL
Qty: 100 EACH | Refills: 12 | Status: SHIPPED | OUTPATIENT
Start: 2020-07-31

## 2020-07-31 RX ORDER — PREGABALIN 100 MG/1
CAPSULE ORAL
Qty: 180 CAPSULE | Refills: 0 | OUTPATIENT
Start: 2020-07-31 | End: 2020-10-27

## 2020-07-31 RX ORDER — CLOTRIMAZOLE AND BETAMETHASONE DIPROPIONATE 10; .64 MG/G; MG/G
CREAM TOPICAL
OUTPATIENT
Start: 2020-07-31

## 2020-07-31 RX ORDER — INSULIN GLARGINE 100 [IU]/ML
INJECTION, SOLUTION SUBCUTANEOUS
Qty: 15 PEN | Refills: 1 | Status: SHIPPED | OUTPATIENT
Start: 2020-07-31 | End: 2020-12-02 | Stop reason: SDUPTHER

## 2020-07-31 RX ORDER — ESOMEPRAZOLE MAGNESIUM 40 MG/1
CAPSULE, DELAYED RELEASE ORAL
Qty: 90 CAPSULE | Refills: 1 | Status: SHIPPED | OUTPATIENT
Start: 2020-07-31 | End: 2020-12-01 | Stop reason: SDUPTHER

## 2020-07-31 RX ORDER — PEN NEEDLE, DIABETIC 32GX 5/32"
NEEDLE, DISPOSABLE MISCELLANEOUS
Qty: 100 EACH | Refills: 12 | Status: SHIPPED | OUTPATIENT
Start: 2020-07-31

## 2020-07-31 RX ORDER — HYDROXYZINE PAMOATE 25 MG/1
CAPSULE ORAL
Qty: 90 CAPSULE | Refills: 0 | Status: SHIPPED | OUTPATIENT
Start: 2020-07-31

## 2020-07-31 RX ORDER — FERROUS SULFATE 325(65) MG
325 TABLET ORAL
Qty: 30 TABLET | OUTPATIENT
Start: 2020-07-31

## 2020-07-31 RX ORDER — ATENOLOL 25 MG/1
TABLET ORAL
Qty: 90 TABLET | Refills: 1 | Status: SHIPPED | OUTPATIENT
Start: 2020-07-31 | End: 2020-11-06 | Stop reason: SDUPTHER

## 2020-07-31 RX ORDER — PRASTERONE (DHEA) 50 MG
1 TABLET ORAL DAILY
OUTPATIENT
Start: 2020-07-31

## 2020-07-31 RX ORDER — DULOXETIN HYDROCHLORIDE 60 MG/1
60 CAPSULE, DELAYED RELEASE ORAL 2 TIMES DAILY
Qty: 180 CAPSULE | Refills: 1 | Status: SHIPPED | OUTPATIENT
Start: 2020-07-31 | End: 2021-02-09

## 2020-09-18 NOTE — TELEPHONE ENCOUNTER
Rx request   Requested Prescriptions     Pending Prescriptions Disp Refills    pregabalin (LYRICA) 100 MG capsule 180 capsule 0     Sig: TAKE 1 CAPSULE BY MOUTH TWICE DAILY FOR 90 DAYS     LOV 8/14/2020  Next Visit Date:  Future Appointments   Date Time Provider Taylor Tejada   9/29/2020  2:30 PM [facility]   10/20/2020  2:30 PM Margo Vincent MD [facility]

## 2020-09-21 RX ORDER — PREGABALIN 100 MG/1
CAPSULE ORAL
Qty: 180 CAPSULE | Refills: 0 | Status: SHIPPED | OUTPATIENT
Start: 2020-09-21 | End: 2020-12-01 | Stop reason: SDUPTHER

## 2020-09-29 ENCOUNTER — OFFICE VISIT (OUTPATIENT)
Dept: FAMILY MEDICINE CLINIC | Age: 60
End: 2020-09-29
Payer: COMMERCIAL

## 2020-09-29 VITALS
DIASTOLIC BLOOD PRESSURE: 80 MMHG | SYSTOLIC BLOOD PRESSURE: 132 MMHG | RESPIRATION RATE: 16 BRPM | WEIGHT: 195 LBS | HEIGHT: 61 IN | HEART RATE: 73 BPM | TEMPERATURE: 95.6 F | OXYGEN SATURATION: 99 % | BODY MASS INDEX: 36.82 KG/M2

## 2020-09-29 PROBLEM — B35.3 TINEA PEDIS OF RIGHT FOOT: Status: ACTIVE | Noted: 2020-09-29

## 2020-09-29 PROBLEM — E66.812 CLASS 2 SEVERE OBESITY DUE TO EXCESS CALORIES WITH SERIOUS COMORBIDITY AND BODY MASS INDEX (BMI) OF 36.0 TO 36.9 IN ADULT: Chronic | Status: ACTIVE | Noted: 2020-09-29

## 2020-09-29 PROBLEM — E66.01 CLASS 2 SEVERE OBESITY DUE TO EXCESS CALORIES WITH SERIOUS COMORBIDITY AND BODY MASS INDEX (BMI) OF 36.0 TO 36.9 IN ADULT (HCC): Chronic | Status: ACTIVE | Noted: 2020-09-29

## 2020-09-29 PROCEDURE — 90732 PPSV23 VACC 2 YRS+ SUBQ/IM: CPT | Performed by: FAMILY MEDICINE

## 2020-09-29 PROCEDURE — G8427 DOCREV CUR MEDS BY ELIG CLIN: HCPCS | Performed by: FAMILY MEDICINE

## 2020-09-29 PROCEDURE — G8417 CALC BMI ABV UP PARAM F/U: HCPCS | Performed by: FAMILY MEDICINE

## 2020-09-29 PROCEDURE — 4004F PT TOBACCO SCREEN RCVD TLK: CPT | Performed by: FAMILY MEDICINE

## 2020-09-29 PROCEDURE — 3046F HEMOGLOBIN A1C LEVEL >9.0%: CPT | Performed by: FAMILY MEDICINE

## 2020-09-29 PROCEDURE — 2022F DILAT RTA XM EVC RTNOPTHY: CPT | Performed by: FAMILY MEDICINE

## 2020-09-29 PROCEDURE — G0008 ADMIN INFLUENZA VIRUS VAC: HCPCS | Performed by: FAMILY MEDICINE

## 2020-09-29 PROCEDURE — 99214 OFFICE O/P EST MOD 30 MIN: CPT | Performed by: FAMILY MEDICINE

## 2020-09-29 PROCEDURE — 3017F COLORECTAL CA SCREEN DOC REV: CPT | Performed by: FAMILY MEDICINE

## 2020-09-29 PROCEDURE — G0009 ADMIN PNEUMOCOCCAL VACCINE: HCPCS | Performed by: FAMILY MEDICINE

## 2020-09-29 PROCEDURE — 90688 IIV4 VACCINE SPLT 0.5 ML IM: CPT | Performed by: FAMILY MEDICINE

## 2020-09-29 RX ORDER — TERBINAFINE HYDROCHLORIDE 250 MG/1
250 TABLET ORAL DAILY
Qty: 14 TABLET | Refills: 0 | Status: SHIPPED | OUTPATIENT
Start: 2020-09-29 | End: 2020-10-13

## 2020-09-29 ASSESSMENT — PATIENT HEALTH QUESTIONNAIRE - PHQ9
2. FEELING DOWN, DEPRESSED OR HOPELESS: 2
SUM OF ALL RESPONSES TO PHQ QUESTIONS 1-9: 2
SUM OF ALL RESPONSES TO PHQ QUESTIONS 1-9: 2
SUM OF ALL RESPONSES TO PHQ9 QUESTIONS 1 & 2: 2
1. LITTLE INTEREST OR PLEASURE IN DOING THINGS: 0

## 2020-09-29 NOTE — PROGRESS NOTES
Subjective  Mu Ballard, 62 y.o. female presents today with:  Chief Complaint   Patient presents with    Diabetes     3month follow up. HPI    Patient is here for DM f/u. Sugars have been moderately well-controlled 150-199 and patient has not been experiencing hyper- or hypoglycemic symptoms. Compliance with meds is fair - forgets mealtime insulin -  and there are no side effects. Patient exercises occasionally and tries to eat healthy. Obesity - Patient is interested in Adipex.      No other questions and or concerns for today's visit    Review of Systems lower appetite      Past Medical History:   Diagnosis Date    Arthritis     Bulging lumbar disc     lower back    Diabetes mellitus ([facility] [address]     Fibromyalgia      Past Surgical History:   Procedure Laterality Date     SECTION      2990,3524,1627,    FINGER TRIGGER RELEASE Left 2020    TRIGGER RELEASE MIDDLE FINGER AND RING FINGER LEFT HAND performed by Dain Haines MD at [facility]    kidney stone removal      Social History     Socioeconomic History    Marital status:      Spouse name: Not on file    Number of children: Not on file    Years of education: Not on file    Highest education level: Not on file   Occupational History    Not on file   Social Needs    Financial resource strain: Not on file    Food insecurity     Worry: Not on file     Inability: Not on file    Transportation needs     Medical: Not on file     Non-medical: Not on file   Tobacco Use    Smoking status: Current Every Day Smoker     Packs/day: 1.50     Years: 44.00     Pack years: 66.00     Types: Cigarettes     Start date: 1976    Smokeless tobacco: Never Used   Substance and Sexual Activity    Alcohol use: Yes     Comment: occasionally    Drug use: Never    Sexual activity: Not on file   Lifestyle    Physical activity     Days per week: Not on file     Minutes per session: Not on file  Stress: Not on file   Relationships    Social connections     Talks on phone: Not on file     Gets together: Not on file     Attends Mandaen service: Not on file     Active member of club or organization: Not on file     Attends meetings of clubs or organizations: Not on file     Relationship status: Not on file    Intimate partner violence     Fear of current or ex partner: Not on file     Emotionally abused: Not on file     Physically abused: Not on file     Forced sexual activity: Not on file   Other Topics Concern    Not on file   Social History Narrative    Not on file     Family History   Problem Relation Age of Onset    Diabetes Mother     Diabetes Father     Heart Attack Father      Allergies   Allergen Reactions    Bactrim [Sulfamethoxazole-Trimethoprim]     Oxycodone      Patient reports she \"was addicted to it in the past\".     Statins      Current Outpatient Medications   Medication Sig Dispense Refill    pregabalin (LYRICA) 100 MG capsule TAKE 1 CAPSULE BY MOUTH TWICE DAILY FOR 90 DAYS 180 capsule 0    atenolol (TENORMIN) 25 MG tablet TAKE 1 TABLET BY MOUTH ONCE DAILY 90 tablet 1    BASAGLAR KWIKPEN 100 UNIT/ML injection pen INJECT 55 UNITS SUBCUTANEOUSLY ONCE DAILY (Patient taking differently: INJECT 60 UNITS SUBCUTANEOUSLY ONCE DAILY) 15 pen 1    NOVOLOG FLEXPEN 100 UNIT/ML injection pen INJECT 8 UNITS SUBCUTANEOUSLY THREE TIMES DAILY BEFORE MEAL(S) (Patient not taking: Reported on 10/27/2020) 15 pen 1    DULoxetine (CYMBALTA) 60 MG extended release capsule Take 1 capsule by mouth 2 times daily 180 capsule 1    metFORMIN (GLUCOPHAGE) 1000 MG tablet TAKE 1 TABLET BY MOUTH TWICE DAILY WITH MEALS 180 tablet 1    BD PEN NEEDLE STEFFANIE U/F 32G X 4 MM MISC USE 1 TO INJECT INSULIN 4 TIMES DAILY 100 each 12    TRUE METRIX BLOOD GLUCOSE TEST strip USE 1 STRIP TO CHECK GLUCOSE 4 TIMES DAILY 100 each 12    esomeprazole (NEXIUM) 40 MG delayed release capsule TAKE 1 CAPSULE BY MOUTH ONCE DAILY 90 capsule 1    hydrOXYzine (VISTARIL) 25 MG capsule hydroxyzine pamoate 25 mg capsule BID PRN anxiety 90 capsule 0    acetaminophen (TYLENOL) 500 MG tablet Take 2 tablets by mouth 3 times daily 180 tablet 0    zolpidem (AMBIEN) 10 MG tablet Take 10 mg by mouth. Take when have trouble to sleep.  TRUEplus Lancets 33G MISC USE 1 TO CHECK GLUCOSE 4 TIMES DAILY      Blood Glucose Monitoring Suppl (TRUE METRIX AIR GLUCOSE METER) w/Device KIT USE TO CHECK GLUCOSE 4 TIMES DAILY      ferrous sulfate 325 (65 Fe) MG tablet Take 325 mg by mouth daily (with breakfast)      ibuprofen (ADVIL;MOTRIN) 800 MG tablet Take 800 mg by mouth 2 times daily      vitamin D3 (CHOLECALCIFEROL) 10 MCG (400 UNIT) TABS tablet Take 400 Units by mouth daily      Turmeric 500 MG CAPS Take by mouth 2 times daily      APPLE CIDER VINEGAR PO Take by mouth 2 times daily      cyclobenzaprine (FLEXERIL) 10 MG tablet TAKE 1 TABLET BY MOUTH NIGHTLY AS NEEDED FOR MUSCLE SPASM       No current facility-administered medications for this visit. PMH, Surgical Hx, Family Hx, and Social Hxreviewed and updated. Health Maintenance reviewed. Objective    Vitals:    09/29/20 1431   BP: 132/80   Site: Right Upper Arm   Position: Sitting   Cuff Size: Medium Adult   Pulse: 73   Resp: 16   Temp: 95.6 °F (35.3 °C)   TempSrc: Temporal   SpO2: 99%   Weight: 195 lb (88.5 kg)   Height: 5' 1\" (1.549 m)        Physical Exam  Constitutional:       Appearance: She is well-developed. HENT:      Head: Normocephalic and atraumatic. Right Ear: External ear normal.      Left Ear: External ear normal.      Nose: Nose normal.   Eyes:      General: No scleral icterus. Conjunctiva/sclera: Conjunctivae normal.   Neck:      Musculoskeletal: Normal range of motion and neck supple. Thyroid: No thyromegaly. Cardiovascular:      Rate and Rhythm: Normal rate and regular rhythm. Heart sounds: Normal heart sounds.    Pulmonary:      Effort: Pulmonary effort is normal.      Breath sounds: Normal breath sounds. Musculoskeletal:      Right lower leg: No edema. Left lower leg: No edema. Lymphadenopathy:      Cervical: No cervical adenopathy. Skin:     General: Skin is warm and dry. Neurological:      Mental Status: She is alert and oriented to person, place, and time. Psychiatric:         Mood and Affect: Mood normal.         Behavior: Behavior normal.         Thought Content:  Thought content normal.         Judgment: Judgment normal.           Lab Results   Component Value Date    LABA1C 9.2 (H) 10/16/2020    LABA1C 8.6 (H) 05/01/2020     Lab Results   Component Value Date    LABMICR 8.30 (H) 10/16/2020    CREATININE 1.04 (H) 10/27/2020     Lab Results   Component Value Date    ALT 35 (H) 05/01/2020    AST 26 05/01/2020     Lab Results   Component Value Date    CHOL 187 10/16/2020    TRIG 151 (H) 10/16/2020    HDL 32 (L) 10/16/2020    [address] 10/16/2020        Assessment & Plan   Visit Diagnoses and Associated Orders     Screening for HIV (human immunodeficiency virus)    -  Primary    HIV-1,2 Combo Ag/Ab By ELIAS, Reflexive Panel [[id number] Custom]   - Future Order         Encounter for hepatitis C screening test for low risk patient        Hepatitis C Antibody [[id number] Custom]   - Future Order         Screen for colon cancer        Cologuard (For External Results Only) [[id number] Custom]   - Future Order         Need for Streptococcus pneumoniae and influenza vaccination        Pneumococcal polysaccharide vaccine 23-valent greater than or equal to 3yo subcutaneous/IM [[id number] Custom]      INFLUENZA, QUADV, 3 YRS AND OLDER, IM, MDV, 0.5ML (Zay Fraction) [[id number] Custom]           Type 2 diabetes mellitus with diabetic neuropathy, with long-term current use of insulin ([facility] [address]        Lipid Panel [[id number] Custom]   - Future Order    Microalbumin / Creatinine Urine Ratio [[id number] Custom]   - Future Order     DIABETES FOOT EXAM [[facility] [place], Desiree Humphrey MD, Endocrinology, [facility] Custom]      AFL - Samantha Bhatt DPM, Podiatry, [place] [[id number] Custom]      Hemoglobin A1C [[id number] Custom]   - Future Order         Mood disorder [facility])             Tobacco abuse             Chronic musculoskeletal pain        Amb External Referral To Rheumatology [[id number] Custom]           Abnormal antinuclear antibody titer        Amb External Referral To Rheumatology [[id number] Custom]           Class 2 severe obesity due to excess calories with serious comorbidity and body mass index (BMI) of 36.0 to 36.9 in adult [facility])        Ino Roman MD, Endocrinology, [facility] Custom]           Foot pain, left        AFL - Samantha Bhatt DPM, Podiatry, [place] [[id number] Custom]           Tinea pedis of right foot        terbinafine (LAMISIL) 250 MG tablet [[id number]]           Tachycardia        Magnesium [[id number] Custom]   - Future Order                 Reviewed with the patient: all disease processes, current clinical status, medications, activities and diet.      Side effects, adverse effects of the medication prescribed today, as well as treatment plan/ rationale and result expectations have been discussed with the patient who expresses understanding and desires to proceed.     Close follow up to evaluate treatment results and for coordination of care. I have reviewed the patient's medical history in detail and updated the computerized patient record. More than 50% of the appointment was spent in face-to-face counseling, education and care coordination. Orders Placed This Encounter   Procedures    Cologuard (For External Results Only)     This test is performed by an external laboratory and is used for result attachment only. It is required that this order requisition be faxed to: [employer] @ [phone]. See [email or web address] for further information.      Standing Status:   Future     Standing Expiration Date:   9/29/2021    Pneumococcal polysaccharide vaccine 23-valent greater than or equal to 1yo subcutaneous/IM    INFLUENZA, QUADV, 3 YRS AND OLDER, IM, MDV, 0.5ML (AFLURIA QUADV)    Lipid Panel     Standing Status:   Future     Number of Occurrences:   1     Standing Expiration Date:   9/29/2021     Order Specific Question:   Is Patient Fasting?/# of Hours     Answer:   8 hours    Hepatitis C Antibody     Standing Status:   Future     Number of Occurrences:   1     Standing Expiration Date:   9/29/2021    HIV-1,2 Combo Ag/Ab By ELIAS, Reflexive Panel     Standing Status:   Future     Number of Occurrences:   1     Standing Expiration Date:   9/29/2021    Microalbumin / Creatinine Urine Ratio     Standing Status:   Future     Number of Occurrences:   1     Standing Expiration Date:   9/29/2021    Hemoglobin A1C     Standing Status:   Future     Number of Occurrences:   1     Standing Expiration Date:   9/29/2021    Magnesium     Standing Status:   Future     Number of Occurrences:   1     Standing Expiration Date:   9/29/2021    Amb External Referral To Rheumatology     Referral Priority:   Routine     Referral Type:   Eval and Treat     Referral Reason:   Specialty Services Required     Referred to Provider:   Mady Yip MD     Requested Specialty:   Internal Medicine     Number of Visits Requested:   Neris Briscoe MD, Endocrinology, [facility]     Referral Priority:   Routine     Referral Type:   Eval and Treat     Referral Reason:   Specialty Services Required     Referred to Provider:   Marzena Rodriguez MD     Requested Specialty:   Endocrinology     Number of Visits Requested:   1    BLADIMIR Cadena, [facility] brush     Referral Priority:   Routine     Referral Type:   Eval and Treat     Referral Reason:   Specialty Services Required     Referred to Provider:   Anamaria Dang DPM     Requested Specialty:   Podiatry     Number of Visits Requested:   1     DIABETES FOOT EXAM     Orders Placed This Encounter   Medications    terbinafine (LAMISIL) 250 MG tablet     Sig: Take 1 tablet by mouth daily for 14 days     Dispense:  14 tablet     Refill:  0     Medications Discontinued During This Encounter   Medication Reason    clotrimazole-betamethasone (LOTRISONE) 1-0.05 % cream LIST CLEANUP    DHEA 50 MG TABS LIST CLEANUP     Return in about 2 weeks (around 10/13/2020) for hormones VV. Controlled Substance Monitoring:    Acute and Chronic Pain Monitoring:   No flowsheet data found.         Shen Trammell MD

## 2020-09-29 NOTE — PROGRESS NOTES
Vaccine Information Sheet, \"Influenza - Inactivated\"  given to Joseph Lovelaceer, or parent/legal guardian of  Joseph Kothari and verbalized understanding. Patient responses:    Have you ever had a reaction to a flu vaccine? No  Are you able to eat eggs without adverse effects? Yes  Do you have any current illness? No  Have you ever had [place] Syndrome? No    Flu vaccine given per order. Please see immunization tab.

## 2020-10-16 DIAGNOSIS — Z11.59 ENCOUNTER FOR HEPATITIS C SCREENING TEST FOR LOW RISK PATIENT: ICD-10-CM

## 2020-10-16 DIAGNOSIS — Z11.4 SCREENING FOR HIV (HUMAN IMMUNODEFICIENCY VIRUS): ICD-10-CM

## 2020-10-16 DIAGNOSIS — Z79.4 TYPE 2 DIABETES MELLITUS WITH DIABETIC NEUROPATHY, WITH LONG-TERM CURRENT USE OF INSULIN (HCC): Chronic | ICD-10-CM

## 2020-10-16 DIAGNOSIS — R00.0 TACHYCARDIA: Chronic | ICD-10-CM

## 2020-10-16 DIAGNOSIS — E11.40 TYPE 2 DIABETES MELLITUS WITH DIABETIC NEUROPATHY, WITH LONG-TERM CURRENT USE OF INSULIN (HCC): Chronic | ICD-10-CM

## 2020-10-16 LAB
CHOLESTEROL, TOTAL: 187 MG/DL (ref 0–199)
CREATININE URINE: 89.7 MG/DL
HBA1C MFR BLD: 9.2 % (ref 4.8–5.9)
HDLC SERPL-MCNC: 32 MG/DL (ref 40–59)
HEPATITIS C ANTIBODY INTERPRETATION: NORMAL
LDL CHOLESTEROL CALCULATED: 125 MG/DL (ref 0–129)
MAGNESIUM: 1.6 MG/DL (ref 1.7–2.4)
MICROALBUMIN UR-MCNC: 8.3 MG/DL
MICROALBUMIN/CREAT UR-RTO: 92.5 MG/G (ref 0–30)
TRIGL SERPL-MCNC: 151 MG/DL (ref 0–150)

## 2020-10-18 LAB — HIV 1,2 COMBO ANTIGEN/ANTIBODY: NEGATIVE

## 2020-10-20 ENCOUNTER — OFFICE VISIT (OUTPATIENT)
Dept: ORTHOPEDIC SURGERY | Age: 60
End: 2020-10-20
Payer: MEDICARE

## 2020-10-20 VITALS
BODY MASS INDEX: 36.82 KG/M2 | OXYGEN SATURATION: 98 % | HEART RATE: 80 BPM | TEMPERATURE: 96.8 F | WEIGHT: 195 LBS | HEIGHT: 61 IN

## 2020-10-20 PROCEDURE — 99214 OFFICE O/P EST MOD 30 MIN: CPT | Performed by: ORTHOPAEDIC SURGERY

## 2020-10-20 PROCEDURE — G8417 CALC BMI ABV UP PARAM F/U: HCPCS | Performed by: ORTHOPAEDIC SURGERY

## 2020-10-20 PROCEDURE — 4004F PT TOBACCO SCREEN RCVD TLK: CPT | Performed by: ORTHOPAEDIC SURGERY

## 2020-10-20 PROCEDURE — 3017F COLORECTAL CA SCREEN DOC REV: CPT | Performed by: ORTHOPAEDIC SURGERY

## 2020-10-20 PROCEDURE — G8482 FLU IMMUNIZE ORDER/ADMIN: HCPCS | Performed by: ORTHOPAEDIC SURGERY

## 2020-10-20 PROCEDURE — G8427 DOCREV CUR MEDS BY ELIG CLIN: HCPCS | Performed by: ORTHOPAEDIC SURGERY

## 2020-10-20 NOTE — PROGRESS NOTES
Subjective:      Patient ID: Mu Ballard is a 62 y.o. female who presents today for:  Chief Complaint   Patient presents with    Follow-up     wants to discuss surgery for right hand trigger finger       HPI  The patient had a cortisone injection given in  of this year which did not help her very much    Past Medical History:   Diagnosis Date    Arthritis     Bulging lumbar disc     lower back    Diabetes mellitus ([address]     Fibromyalgia      Past Surgical History:   Procedure Laterality Date     SECTION      [id number],    FINGER TRIGGER RELEASE Left 2020    TRIGGER RELEASE MIDDLE FINGER AND RING FINGER LEFT HAND performed by Dain Haines MD at [facility]    kidney stone removal      Social History     Socioeconomic History    Marital status:      Spouse name: Not on file    Number of children: Not on file    Years of education: Not on file    Highest education level: Not on file   Occupational History    Not on file   Social Needs    Financial resource strain: Not on file    Food insecurity     Worry: Not on file     Inability: Not on file    Transportation needs     Medical: Not on file     Non-medical: Not on file   Tobacco Use    Smoking status: Current Every Day Smoker     Packs/day: 1.50     Types: Cigarettes     Start date: 1976    Smokeless tobacco: Never Used   Substance and Sexual Activity    Alcohol use: Yes     Comment: occasionally    Drug use: Never    Sexual activity: Not on file   Lifestyle    Physical activity     Days per week: Not on file     Minutes per session: Not on file    Stress: Not on file   Relationships    Social connections     Talks on phone: Not on file     Gets together: Not on file     Attends Mosque service: Not on file     Active member of club or organization: Not on file     Attends meetings of clubs or organizations: Not on file     Relationship status: Not on file   [facility] Intimate partner violence     Fear of current or ex partner: Not on file     Emotionally abused: Not on file     Physically abused: Not on file     Forced sexual activity: Not on file   Other Topics Concern    Not on file   Social History Narrative    Not on file     Family History   Problem Relation Age of Onset    Diabetes Mother     Diabetes Father     Heart Attack Father      Allergies   Allergen Reactions    Bactrim [Sulfamethoxazole-Trimethoprim]     Oxycodone      Patient reports she \"was addicted to it in the past\".     Statins      Current Outpatient Medications on File Prior to Visit   Medication Sig Dispense Refill    cyclobenzaprine (FLEXERIL) 10 MG tablet TAKE 1 TABLET BY MOUTH NIGHTLY AS NEEDED FOR MUSCLE SPASM      meloxicam (MOBIC) 7.5 MG tablet TAKE 1 TABLET BY MOUTH ONCE DAILY      pregabalin (LYRICA) 100 MG capsule TAKE 1 CAPSULE BY MOUTH TWICE DAILY FOR 90 DAYS 180 capsule 0    atenolol (TENORMIN) 25 MG tablet TAKE 1 TABLET BY MOUTH ONCE DAILY 90 tablet 1    BASAGLAR KWIKPEN 100 UNIT/ML injection pen INJECT 55 UNITS SUBCUTANEOUSLY ONCE DAILY 15 pen 1    NOVOLOG FLEXPEN 100 UNIT/ML injection pen INJECT 8 UNITS SUBCUTANEOUSLY THREE TIMES DAILY BEFORE MEAL(S) 15 pen 1    DULoxetine (CYMBALTA) 60 MG extended release capsule Take 1 capsule by mouth 2 times daily 180 capsule 1    metFORMIN (GLUCOPHAGE) 1000 MG tablet TAKE 1 TABLET BY MOUTH TWICE DAILY WITH MEALS 180 tablet 1    BD PEN NEEDLE STEFFANIE U/F 32G X 4 MM MISC USE 1 TO INJECT INSULIN 4 TIMES DAILY 100 each 12    TRUE METRIX BLOOD GLUCOSE TEST strip USE 1 STRIP TO CHECK GLUCOSE 4 TIMES DAILY 100 each 12    esomeprazole (NEXIUM) 40 MG delayed release capsule TAKE 1 CAPSULE BY MOUTH ONCE DAILY 90 capsule 1    hydrOXYzine (VISTARIL) 25 MG capsule hydroxyzine pamoate 25 mg capsule BID PRN anxiety 90 capsule 0    acetaminophen (TYLENOL) 500 MG tablet Take 2 tablets by mouth 3 times daily 180 tablet 0    zolpidem (AMBIEN) 10 MG tablet Take 10 mg by mouth. Take when have trouble to sleep.  TRUEplus Lancets 33G MISC USE 1 TO CHECK GLUCOSE 4 TIMES DAILY      Blood Glucose Monitoring Suppl (TRUE METRIX AIR GLUCOSE METER) w/Device KIT USE TO CHECK GLUCOSE 4 TIMES DAILY      ferrous sulfate 325 (65 Fe) MG tablet Take 325 mg by mouth daily (with breakfast)      naproxen (NAPROSYN) 250 MG tablet Take 2 tablets by mouth 2 times daily (with meals) (Patient not taking: Reported on 9/29/2020) 20 tablet 0     No current facility-administered medications on file prior to visit. Controlled Substance Monitoring:    Acute and Chronic Pain Monitoring:   No flowsheet data found. Review of Systems    Objective:   Pulse 80   Temp 96.8 °F (36 °C) (Temporal)   Ht 5' 1\" (1.549 m)   Wt 195 lb (88.5 kg)   SpO2 98%   BMI 36.84 kg/m²     Physical Exam:    Right hand ring finger  She has tenderness present over the A1 pulley  Triggering is present on flexion extension of the ring finger  She has no tenderness in the rest of the A1 pulleys of the little middle or index finger over the thumb  Sensations intact to light touch and pressure in all the fingers  Full range of wrist motion is noted      Diagnostic Imaging:    No x-rays were taken today      Assessment:       Diagnosis Orders   1. Trigger finger, right ring finger           Plan:      Patient had good outcome with surgical intervention for the left hand trigger fingers  He wishes to have the same procedure done on the right hand ring finger    The surgical procedure was been explained, risks and benefits of surgery were also discussed. Risk of anesthesia versus a beers block, risk of injury to the vessel, nerve, tendon.   Postoperative complications such as hematoma formation, infection, restricted range of motion, recurrence of the triggering, neuroma formation, wound healing problems have all been discussed    The patient understands all the risks and benefits and consents for surgery on her right hand ring finger        Surgery Phone: [address]   Surgery Fax: [phone]    Phone: [phone]   PAT Fax: [phone]    Fax: [phone]    Orthopedics: Surgery Scheduling, PAT & PRE-OP Order Form  Call to advance [place] at [phone] at least 24 hours prior to date of service     Surgery Location: [facility] Surgery: [address] [place] [address] Neli Bee MD Surgery Date: 11/3/2020  Time: am   Patient's Name: Steven So : 1960    Gender: female  Home Phone:  [phone] Cell Phone: [phone]  Emergency Contact:  [address] [place] [address]   Phone: [phone]  Payor: Allan Malik /  /  /    ID No.: [id number]      PROVIDER TO COMPLETE:  Diagnosis: Right ring finger trigger finger  Procedure/Consent: Release of right ring finger trigger finger  Case Comments/Implants: N/A   Surgery Scheduled as: Outpatient  Anesthesia Requested: Alee Smith  Referring Family Doctor: Bubba Aguila MD   PAT  [x] Shae CHILDERS Date/Time:                                                            [x] History & Physical [] Physician will Provide [] Attached [] Dictated [] Other  [x] Follow Anesthesia Pre-Op Orders for X-rays, [facility]     [x] SN & PT to evaluate and treat/educate disease management, medications, home safety & equipment needs for total joint patients  [] Other: ____________________________________________________  Consults: Medical/Cardiac Clearance done by  ____________________  PRE-OP ORDERS:   Allergies: Bactrim [sulfamethoxazole-trimethoprim];  Oxycodone; and Statins Latex Allergies:             Diabetic:           [] IV ________________________  [x] IV Start with J-loop     Preprinted Orders: Attached [] Yes [] No   ANTIBIOTIC PRE-OP: [x] ANCEF 2 gram IVPB if > 120 kg 3 grams IVPB within 1 hour of incision, if ALLERGIC, use VANCOMYCIN 1 gram IV, 2 hours pre-op  [] TXA Protocol [] Other:   [x] NPO   [] Betablocker (if needed) _____________________________________   [] Knee high anti-embolic hose [] Thigh high anti-embolic hose   Other: ______________________________________________________    Physician Signature Required:    Date/Time: 10/20/2020           No orders of the defined types were placed in this encounter. No orders of the defined types were placed in this encounter. Return Plan for surgery.       Arthur Dakins, MD

## 2020-10-24 NOTE — PATIENT INSTRUCTIONS
This telephone encounter was conducted between this writer who is home-based and the above named patient who is at home in lieu of a face-to-face visit in setting of [facility] public health emergency. appropriate

## 2020-10-27 ENCOUNTER — NURSE ONLY (OUTPATIENT)
Dept: PRIMARY CARE CLINIC | Age: 60
End: 2020-10-27

## 2020-10-27 ENCOUNTER — OFFICE VISIT (OUTPATIENT)
Dept: FAMILY MEDICINE CLINIC | Age: 60
End: 2020-10-27
Payer: COMMERCIAL

## 2020-10-27 VITALS
TEMPERATURE: 97 F | BODY MASS INDEX: 36.55 KG/M2 | HEIGHT: 61 IN | SYSTOLIC BLOOD PRESSURE: 138 MMHG | WEIGHT: 193.6 LBS | HEART RATE: 79 BPM | DIASTOLIC BLOOD PRESSURE: 78 MMHG | OXYGEN SATURATION: 96 %

## 2020-10-27 DIAGNOSIS — Z01.818 PRE-OP EXAM: ICD-10-CM

## 2020-10-27 LAB
ANION GAP SERPL CALCULATED.3IONS-SCNC: 16 MEQ/L (ref 9–15)
BUN BLDV-MCNC: 23 MG/DL (ref 6–20)
CALCIUM SERPL-MCNC: 10 MG/DL (ref 8.5–9.9)
CHLORIDE BLD-SCNC: 103 MEQ/L (ref 95–107)
CO2: 24 MEQ/L (ref 20–31)
CREAT SERPL-MCNC: 1.04 MG/DL (ref 0.5–0.9)
GFR AFRICAN AMERICAN: >60
GFR NON-AFRICAN AMERICAN: 54.1
GLUCOSE BLD-MCNC: 71 MG/DL (ref 70–99)
HCT VFR BLD CALC: 41 % (ref 37–47)
HEMOGLOBIN: 13.4 G/DL (ref 12–16)
MCH RBC QN AUTO: 27.1 PG (ref 27–31.3)
MCHC RBC AUTO-ENTMCNC: 32.6 % (ref 33–37)
MCV RBC AUTO: 83 FL (ref 82–100)
PDW BLD-RTO: 15.3 % (ref 11.5–14.5)
PLATELET # BLD: 340 K/UL (ref 130–400)
POTASSIUM SERPL-SCNC: 4.2 MEQ/L (ref 3.4–4.9)
RBC # BLD: 4.94 M/UL (ref 4.2–5.4)
SODIUM BLD-SCNC: 143 MEQ/L (ref 135–144)
WBC # BLD: 9.3 K/UL (ref 4.8–10.8)

## 2020-10-27 PROCEDURE — 93000 ELECTROCARDIOGRAM COMPLETE: CPT | Performed by: NURSE PRACTITIONER

## 2020-10-27 PROCEDURE — 99213 OFFICE O/P EST LOW 20 MIN: CPT | Performed by: NURSE PRACTITIONER

## 2020-10-27 PROCEDURE — U0003 INFECTIOUS AGENT DETECTION BY NUCLEIC ACID (DNA OR RNA); SEVERE ACUTE RESPIRATORY SYNDROME CORONAVIRUS 2 (SARS-COV-2) (CORONAVIRUS DISEASE [COVID-19]), AMPLIFIED PROBE TECHNIQUE, MAKING USE OF HIGH THROUGHPUT TECHNOLOGIES AS DESCRIBED BY CMS-2020-01-R: HCPCS

## 2020-10-27 PROCEDURE — G8417 CALC BMI ABV UP PARAM F/U: HCPCS | Performed by: NURSE PRACTITIONER

## 2020-10-27 PROCEDURE — G8427 DOCREV CUR MEDS BY ELIG CLIN: HCPCS | Performed by: NURSE PRACTITIONER

## 2020-10-27 PROCEDURE — G8482 FLU IMMUNIZE ORDER/ADMIN: HCPCS | Performed by: NURSE PRACTITIONER

## 2020-10-27 RX ORDER — VIT C/B6/B5/MAGNESIUM/HERB 173 50-5-6-5MG
CAPSULE ORAL 2 TIMES DAILY
COMMUNITY

## 2020-10-27 RX ORDER — IBUPROFEN 800 MG/1
800 TABLET ORAL 2 TIMES DAILY
COMMUNITY
End: 2020-11-09 | Stop reason: ALTCHOICE

## 2020-10-27 RX ORDER — OMEGA-3S/DHA/EPA/FISH OIL/D3 300MG-1000
400 CAPSULE ORAL DAILY
COMMUNITY
End: 2021-07-07

## 2020-10-27 NOTE — PROGRESS NOTES
Preoperative Consultation      Amina Butler  YOB: 1960    Date of Service:  10/27/2020    Vitals:    10/27/20 1234   BP: 138/78   Site: Right Upper Arm   Position: Sitting   Cuff Size: Large Adult   Pulse: 79   Temp: 97 °F (36.1 °C)   TempSrc: Temporal   SpO2: 96%   Weight: 193 lb 9.6 oz (87.8 kg)   Height: 5' 1\" (1.549 m)      Wt Readings from Last 2 Encounters:   10/27/20 193 lb 9.6 oz (87.8 kg)   10/20/20 195 lb (88.5 kg)     BP Readings from Last 3 Encounters:   10/27/20 138/78   09/29/20 132/80   06/23/20 (!) 152/92        Chief Complaint   Patient presents with   [facility] Pre-op Exam     (DR. Gilma Velazquez) RELEASE OF RIGHT RING FINGER TRIGGER FINGER (DATE OF SURGERY      Allergies   Allergen Reactions    Bactrim [Sulfamethoxazole-Trimethoprim]     Oxycodone      Patient reports she \"was addicted to it in the past\".  Statins      Outpatient Medications Marked as Taking for the 10/27/20 encounter (Office Visit) with Cally Alston.  Samina Age, APRN - CNP   Medication Sig Dispense Refill    ibuprofen (ADVIL;MOTRIN) 800 MG tablet Take 800 mg by mouth 2 times daily      vitamin D3 (CHOLECALCIFEROL) 10 MCG (400 UNIT) TABS tablet Take 400 Units by mouth daily      Turmeric 500 MG CAPS Take by mouth 2 times daily      APPLE CIDER VINEGAR PO Take by mouth 2 times daily      cyclobenzaprine (FLEXERIL) 10 MG tablet TAKE 1 TABLET BY MOUTH NIGHTLY AS NEEDED FOR MUSCLE SPASM      pregabalin (LYRICA) 100 MG capsule TAKE 1 CAPSULE BY MOUTH TWICE DAILY FOR 90 DAYS 180 capsule 0    atenolol (TENORMIN) 25 MG tablet TAKE 1 TABLET BY MOUTH ONCE DAILY 90 tablet 1    BASAGLAR KWIKPEN 100 UNIT/ML injection pen INJECT 55 UNITS SUBCUTANEOUSLY ONCE DAILY (Patient taking differently: INJECT 60 UNITS SUBCUTANEOUSLY ONCE DAILY) 15 pen 1    DULoxetine (CYMBALTA) 60 MG extended release capsule Take 1 capsule by mouth 2 times daily 180 capsule 1    metFORMIN (GLUCOPHAGE) 1000 MG tablet TAKE 1 TABLET BY MOUTH TWICE deficiency    Class 2 severe obesity due to excess calories with serious comorbidity and body mass index (BMI) of 36.0 to 36.9 in adult (HCC)    Tinea pedis of right foot       Past Medical History:   Diagnosis Date    Arthritis     Bulging lumbar disc     lower back    Diabetes mellitus ([address]     Fibromyalgia      Past Surgical History:   Procedure Laterality Date     SECTION      5016,6101,8511,    FINGER TRIGGER RELEASE Left 2020    TRIGGER RELEASE MIDDLE FINGER AND RING FINGER LEFT HAND performed by Agustín Bro MD at [facility]    kidney stone removal      Family History   Problem Relation Age of Onset    Diabetes Mother     Diabetes Father     Heart Attack Father      Social History     Socioeconomic History    Marital status:      Spouse name: Not on file    Number of children: Not on file    Years of education: Not on file    Highest education level: Not on file   Occupational History    Not on file   Social Needs    Financial resource strain: Not on file    Food insecurity     Worry: Not on file     Inability: Not on file    Transportation needs     Medical: Not on file     Non-medical: Not on file   Tobacco Use    Smoking status: Current Every Day Smoker     Packs/day: 1.50     Years: 44.00     Pack years: 66.00     Types: Cigarettes     Start date: 1976    Smokeless tobacco: Never Used   Substance and Sexual Activity    Alcohol use: Yes     Comment: occasionally    Drug use: Never    Sexual activity: Not on file   Lifestyle    Physical activity     Days per week: Not on file     Minutes per session: Not on file    Stress: Not on file   Relationships    Social connections     Talks on phone: Not on file     Gets together: Not on file     Attends Sabianism service: Not on file     Active member of club or organization: Not on file     Attends meetings of clubs or organizations: Not on file     Relationship status: Not on file    Intimate partner violence     Fear of current or ex partner: Not on file     Emotionally abused: Not on file     Physically abused: Not on file     Forced sexual activity: Not on file   Other Topics Concern    Not on file   Social History Narrative    Not on file       Review of Systems  A comprehensive review of systems was negative except for what was noted in the HPI. Physical Exam   Constitutional: She is oriented to person, place, and time. She appears well-developed and well-nourished. No distress. HENT:   Head: Normocephalic and atraumatic. Mouth/Throat: Uvula is midline, oropharynx is clear and moist and mucous membranes are normal.   Eyes: Conjunctivae and EOM are normal. Pupils are equal, round, and reactive to light. Neck: Trachea normal and normal range of motion. Neck supple. No JVD present. Carotid bruit is not present. No mass and no thyromegaly present. Cardiovascular: Normal rate, regular rhythm, normal heart sounds and intact distal pulses. Exam reveals no gallop and no friction rub. No murmur heard. Pulmonary/Chest: Effort normal and breath sounds normal. No respiratory distress. She has no wheezes. She has no rales. Abdominal: Soft. Normal aorta and bowel sounds are normal. She exhibits no distension and no mass. There is no hepatosplenomegaly. No tenderness. Musculoskeletal: She exhibits no edema and no tenderness. Neurological: She is alert and oriented to person, place, and time. She has normal strength. No cranial nerve deficit or sensory deficit. Coordination and gait normal.   Skin: Skin is warm and dry. No rash noted. No erythema. Psychiatric: She has a normal mood and affect. Her behavior is normal.     EKG Interpretation:  normal sinus rhythm. Lab Review Labs ordered        Assessment:       62 y.o. patient with planned surgery as above.     Known risk factors for perioperative complications: COPD (Although she doesn't think she has it), Diabetes mellitus, Renal dysfunction, Tobacco abuse  Current medications which may produce withdrawal symptoms if withheld perioperatively: none      Plan:     1. Preoperative workup as follows: ECG, hemoglobin, hematocrit, electrolytes, creatinine  2. Change in medication regimen before surgery: Take Atenolol on morning of surgery with sip of water, and hold all other medications until after surgery. Stop Tumeric, Ibuprofen, and other vitamins  3. Prophylaxis for cardiac events with perioperative beta-blockers: Currently taking  atenolol  ACC/AHA indications for pre-operative beta-blocker use:    · Vascular surgery with history of postitive stress test  · Intermediate or high risk surgery with history of CAD   · Intermediate or high risk surgery with multiple clinical predictors of CAD- 2 of the following: history of compensated or prior heart failure, history of cerebrovascular disease, DM, or renal insufficiency    Routine administration of higher-dose, long-acting metoprolol in beta-blocker-naïve patients on the day of surgery, and in the absence of dose titration is associated with an overall increase in mortality. Beta-blockers should be started days to weeks prior to surgery and titrated to pulse < 70.  4. Deep vein thrombosis prophylaxis: regimen to be chosen by surgical team  5.  Please review the above to proceed with surgery

## 2020-10-28 LAB
SARS-COV-2: NOT DETECTED
SOURCE: NORMAL

## 2020-11-02 ENCOUNTER — ANESTHESIA EVENT (OUTPATIENT)
Dept: OPERATING ROOM | Age: 60
End: 2020-11-02
Payer: MEDICARE

## 2020-11-02 NOTE — ANESTHESIA PRE PROCEDURE
Department of Anesthesiology  Preprocedure Note       Name:  Joel Bautista   Age:  62 y.o.  :  1960                                          MRN:  [id number]         Date:  2020      Surgeon: Angeli Haro): Dwayne Michele MD    Procedure: RELEASE OF RIGHT RING FINGER TRIGGER FINGER (PAT [facility] [place]) (Right )    Medications prior to admission:   Prior to Admission medications    Medication Sig Start Date End Date Taking?  Authorizing Provider   ibuprofen (ADVIL;MOTRIN) 800 MG tablet Take 800 mg by mouth 2 times daily    Historical Provider, MD   vitamin D3 (CHOLECALCIFEROL) 10 MCG (400 UNIT) TABS tablet Take 400 Units by mouth daily    Historical Provider, MD   Turmeric 500 MG CAPS Take by mouth 2 times daily    Historical Provider, MD   APPLE CIDER VINEGAR PO Take by mouth 2 times daily    Historical Provider, MD   cyclobenzaprine (FLEXERIL) 10 MG tablet TAKE 1 TABLET BY MOUTH NIGHTLY AS NEEDED FOR MUSCLE SPASM 10/3/20   Historical Provider, MD   pregabalin (LYRICA) 100 MG capsule TAKE 1 CAPSULE BY MOUTH TWICE DAILY FOR 90 DAYS 20  Christi Broussard MD   atenolol (TENORMIN) 25 MG tablet TAKE 1 TABLET BY MOUTH ONCE DAILY 20   Christi Broussard MD   BASAGLAR KWIKPEN 100 UNIT/ML injection pen INJECT [address]  Patient taking differently: INJECT 60 UNITS SUBCUTANEOUSLY ONCE DAILY 7/31/20 10/27/20  Christi Broussard MD   NOVOLOG FLEXPEN 100 UNIT/ML injection pen INJECT 8 UNITS SUBCUTANEOUSLY THREE TIMES DAILY BEFORE MEAL(S)  Patient not taking: Reported on 10/27/2020 7/31/20 10/27/20  Christi Broussard MD   DULoxetine (CYMBALTA) 60 MG extended release capsule Take 1 capsule by mouth 2 times daily 20   Christi Broussard MD   metFORMIN (GLUCOPHAGE) 1000 MG tablet TAKE 1 TABLET BY MOUTH TWICE DAILY WITH MEALS 20   Christi Broussard MD   BD PEN NEEDLE STEFFANIE U/F 32G X 4 MM MISC USE 1 TO INJECT INSULIN 4 TIMES DAILY 7/31/20   Ira To MD   TRUE METRIX BLOOD GLUCOSE TEST strip USE 1 STRIP TO CHECK GLUCOSE 4 TIMES DAILY 7/31/20   Ira To MD   esomeprazole (NEXIUM) 40 MG delayed release capsule TAKE 1 CAPSULE BY MOUTH ONCE DAILY 7/31/20   Ira To MD   hydrOXYzine (VISTARIL) 25 MG capsule hydroxyzine pamoate 25 mg capsule BID PRN anxiety 7/31/20   Ira To MD   acetaminophen (TYLENOL) 500 MG tablet Take 2 tablets by mouth 3 times daily 5/5/20   Ry Farr PA-C   zolpidem (AMBIEN) 10 MG tablet Take 10 mg by mouth. Take when have trouble to sleep.  10/31/19   Historical Provider, MD   TRUEplus Lancets 33G MISC USE 1 TO CHECK GLUCOSE 4 TIMES DAILY 1/21/20   Historical Provider, MD   Blood Glucose Monitoring Suppl (TRUE METRIX AIR GLUCOSE METER) w/Device KIT USE TO CHECK GLUCOSE 4 TIMES DAILY 1/27/20   Historical Provider, MD   ferrous sulfate 325 (65 Fe) MG tablet Take 325 mg by mouth daily (with breakfast)    Historical Provider, MD       Current medications:    Current Outpatient Medications   Medication Sig Dispense Refill    ibuprofen (ADVIL;MOTRIN) 800 MG tablet Take 800 mg by mouth 2 times daily      vitamin D3 (CHOLECALCIFEROL) 10 MCG (400 UNIT) TABS tablet Take 400 Units by mouth daily      Turmeric 500 MG CAPS Take by mouth 2 times daily      APPLE CIDER VINEGAR PO Take by mouth 2 times daily      cyclobenzaprine (FLEXERIL) 10 MG tablet TAKE 1 TABLET BY MOUTH NIGHTLY AS NEEDED FOR MUSCLE SPASM      pregabalin (LYRICA) 100 MG capsule TAKE 1 CAPSULE BY MOUTH TWICE DAILY FOR 90 DAYS 180 capsule 0    atenolol (TENORMIN) 25 MG tablet TAKE 1 TABLET BY MOUTH ONCE DAILY 90 tablet 1    BASAGLAR KWIKPEN 100 UNIT/ML injection pen INJECT 55 UNITS SUBCUTANEOUSLY ONCE DAILY (Patient taking differently: INJECT 60 UNITS SUBCUTANEOUSLY ONCE DAILY) 15 pen 1    NOVOLOG FLEXPEN 100 UNIT/ML injection pen INJECT 8 UNITS SUBCUTANEOUSLY THREE TIMES DAILY BEFORE MEAL(S) (Patient not taking: Reported on 10/27/2020) 15 pen 1    DULoxetine (CYMBALTA) 60 MG extended release capsule Take 1 capsule by mouth 2 times daily 180 capsule 1    metFORMIN (GLUCOPHAGE) 1000 MG tablet TAKE 1 TABLET BY MOUTH TWICE DAILY WITH MEALS 180 tablet 1    BD PEN NEEDLE STEFFANIE U/F 32G X 4 MM MISC USE 1 TO INJECT INSULIN 4 TIMES DAILY 100 each 12    TRUE METRIX BLOOD GLUCOSE TEST strip USE 1 STRIP TO CHECK GLUCOSE 4 TIMES DAILY 100 each 12    esomeprazole (NEXIUM) 40 MG delayed release capsule TAKE 1 CAPSULE BY MOUTH ONCE DAILY 90 capsule 1    hydrOXYzine (VISTARIL) 25 MG capsule hydroxyzine pamoate 25 mg capsule BID PRN anxiety 90 capsule 0    acetaminophen (TYLENOL) 500 MG tablet Take 2 tablets by mouth 3 times daily 180 tablet 0    zolpidem (AMBIEN) 10 MG tablet Take 10 mg by mouth. Take when have trouble to sleep.  TRUEplus Lancets 33G MISC USE 1 TO CHECK GLUCOSE 4 TIMES DAILY      Blood Glucose Monitoring Suppl (TRUE METRIX AIR GLUCOSE METER) w/Device KIT USE TO CHECK GLUCOSE 4 TIMES DAILY      ferrous sulfate 325 (65 Fe) MG tablet Take 325 mg by mouth daily (with breakfast)       No current facility-administered medications for this visit. Allergies: Allergies   Allergen Reactions    Bactrim [Sulfamethoxazole-Trimethoprim]     Oxycodone      Patient reports she \"was addicted to it in the past\".     Statins        Problem List:    Patient Active Problem List   Diagnosis Code    GERD (gastroesophageal reflux disease) K21.9    Type 2 diabetes mellitus with neurologic complication, with long-term current use of insulin ([facility] E11.49, Z79.4    Anemia D64.9    Mood disorder (HCC) F39    Chronic musculoskeletal pain M79.18, G89.29    Chronic midline low back pain without sciatica M54.5, G89.29    Chronic neck pain M54.2, G89.29    Osteopenia M85.80    Tobacco abuse Z72.0    Tachycardia R00.0    Abnormal antinuclear antibody titer R76.0    Tendinopathy M67.90    Trigger finger, right ring finger M65.341    Trigger finger, left middle finger M65.332    Vitamin D deficiency E55.9    Class 2 severe obesity due to excess calories with serious comorbidity and body mass index (BMI) of 36.0 to 36.9 in adult ([facility]) E66.01, Z68.36    Tinea pedis of right foot B35.3       Past Medical History:        Diagnosis Date    Arthritis     Bulging lumbar disc     lower back    Diabetes mellitus [facility]     Fibromyalgia        Past Surgical History:        Procedure Laterality Date     SECTION      5724,9941,6593,    FINGER TRIGGER RELEASE Left 2020    TRIGGER RELEASE MIDDLE FINGER AND RING FINGER LEFT HAND performed by Dain Haines MD at [facility]    kidney stone removal        Social History:    Social History     Tobacco Use    Smoking status: Current Every Day Smoker     Packs/day: 1.50     Years: 44.00     Pack years: 66.00     Types: Cigarettes     Start date: 1976    Smokeless tobacco: Never Used   Substance Use Topics    Alcohol use: Yes     Comment: occasionally                                Ready to quit: Not Answered  Counseling given: Not Answered      Vital Signs (Current): There were no vitals filed for this visit.                                            BP Readings from Last 3 Encounters:   10/27/20 138/78   20 132/80   20 (!) 152/92       NPO Status:                                                                                 BMI:   Wt Readings from Last 3 Encounters:   10/27/20 193 lb 9.6 oz (87.8 kg)   10/20/20 195 lb (88.5 kg)   20 195 lb (88.5 kg)     There is no height or weight on file to calculate BMI.    CBC:   Lab Results   Component Value Date    WBC 9.3 10/27/2020    RBC 4.94 10/27/2020    HGB 13.4 10/27/2020    HCT 41.0 10/27/2020    MCV 83.0 10/27/2020    RDW 15.3 10/27/2020     10/27/2020       CMP:   Lab Results   Component

## 2020-11-03 ENCOUNTER — ANESTHESIA (OUTPATIENT)
Dept: OPERATING ROOM | Age: 60
End: 2020-11-03
Payer: MEDICARE

## 2020-11-03 ENCOUNTER — HOSPITAL ENCOUNTER (OUTPATIENT)
Age: 60
Setting detail: OUTPATIENT SURGERY
Discharge: HOME | End: 2020-11-03
Attending: ORTHOPAEDIC SURGERY | Admitting: ORTHOPAEDIC SURGERY
Payer: MEDICARE

## 2020-11-03 VITALS
TEMPERATURE: 97 F | HEART RATE: 72 BPM | BODY MASS INDEX: 36.44 KG/M2 | OXYGEN SATURATION: 94 % | HEIGHT: 61 IN | DIASTOLIC BLOOD PRESSURE: 84 MMHG | SYSTOLIC BLOOD PRESSURE: 154 MMHG | RESPIRATION RATE: 16 BRPM | WEIGHT: 193 LBS

## 2020-11-03 VITALS
RESPIRATION RATE: 24 BRPM | SYSTOLIC BLOOD PRESSURE: 125 MMHG | DIASTOLIC BLOOD PRESSURE: 74 MMHG | OXYGEN SATURATION: 98 %

## 2020-11-03 LAB
GLUCOSE BLD-MCNC: 162 MG/DL (ref 60–115)
GLUCOSE BLD-MCNC: 185 MG/DL (ref 60–115)
PERFORMED ON: ABNORMAL
PERFORMED ON: ABNORMAL

## 2020-11-03 PROCEDURE — 2580000003 HC RX 258: Performed by: ANESTHESIOLOGY

## 2020-11-03 PROCEDURE — 2580000003 HC RX 258: Performed by: ORTHOPAEDIC SURGERY

## 2020-11-03 PROCEDURE — 3600000003 HC SURGERY LEVEL 3 BASE: Performed by: ORTHOPAEDIC SURGERY

## 2020-11-03 PROCEDURE — 3700000000 HC ANESTHESIA ATTENDED CARE: Performed by: ORTHOPAEDIC SURGERY

## 2020-11-03 PROCEDURE — 7100000001 HC PACU RECOVERY - ADDTL 15 MIN: Performed by: ORTHOPAEDIC SURGERY

## 2020-11-03 PROCEDURE — 26055 INCISE FINGER TENDON SHEATH: CPT | Performed by: ORTHOPAEDIC SURGERY

## 2020-11-03 PROCEDURE — 2500000003 HC RX 250 WO HCPCS: Performed by: ANESTHESIOLOGY

## 2020-11-03 PROCEDURE — 7100000010 HC PHASE II RECOVERY - FIRST 15 MIN: Performed by: ORTHOPAEDIC SURGERY

## 2020-11-03 PROCEDURE — 6370000000 HC RX 637 (ALT 250 FOR IP): Performed by: ANESTHESIOLOGY

## 2020-11-03 PROCEDURE — 3700000001 HC ADD 15 MINUTES (ANESTHESIA): Performed by: ORTHOPAEDIC SURGERY

## 2020-11-03 PROCEDURE — 7100000000 HC PACU RECOVERY - FIRST 15 MIN: Performed by: ORTHOPAEDIC SURGERY

## 2020-11-03 PROCEDURE — 2709999900 HC NON-CHARGEABLE SUPPLY: Performed by: ORTHOPAEDIC SURGERY

## 2020-11-03 PROCEDURE — 2500000003 HC RX 250 WO HCPCS: Performed by: ORTHOPAEDIC SURGERY

## 2020-11-03 PROCEDURE — 6360000002 HC RX W HCPCS: Performed by: ORTHOPAEDIC SURGERY

## 2020-11-03 PROCEDURE — 6360000002 HC RX W HCPCS: Performed by: ANESTHESIOLOGY

## 2020-11-03 PROCEDURE — 2580000003 HC RX 258

## 2020-11-03 PROCEDURE — 3600000013 HC SURGERY LEVEL 3 ADDTL 15MIN: Performed by: ORTHOPAEDIC SURGERY

## 2020-11-03 RX ORDER — ATENOLOL 25 MG/1
25 TABLET ORAL ONCE
Status: COMPLETED | OUTPATIENT
Start: 2020-11-03 | End: 2020-11-03

## 2020-11-03 RX ORDER — BUPIVACAINE HYDROCHLORIDE 2.5 MG/ML
INJECTION, SOLUTION EPIDURAL; INFILTRATION; INTRACAUDAL PRN
Status: DISCONTINUED | OUTPATIENT
Start: 2020-11-03 | End: 2020-11-03 | Stop reason: ALTCHOICE

## 2020-11-03 RX ORDER — SODIUM CHLORIDE, SODIUM LACTATE, POTASSIUM CHLORIDE, CALCIUM CHLORIDE 600; 310; 30; 20 MG/100ML; MG/100ML; MG/100ML; MG/100ML
INJECTION, SOLUTION INTRAVENOUS CONTINUOUS PRN
Status: DISCONTINUED | OUTPATIENT
Start: 2020-11-03 | End: 2020-11-03 | Stop reason: SDUPTHER

## 2020-11-03 RX ORDER — SODIUM CHLORIDE, SODIUM LACTATE, POTASSIUM CHLORIDE, CALCIUM CHLORIDE 600; 310; 30; 20 MG/100ML; MG/100ML; MG/100ML; MG/100ML
INJECTION, SOLUTION INTRAVENOUS CONTINUOUS
Status: DISCONTINUED | OUTPATIENT
Start: 2020-11-03 | End: 2020-11-03 | Stop reason: HOSPADM

## 2020-11-03 RX ORDER — SODIUM CHLORIDE, SODIUM LACTATE, POTASSIUM CHLORIDE, CALCIUM CHLORIDE 600; 310; 30; 20 MG/100ML; MG/100ML; MG/100ML; MG/100ML
INJECTION, SOLUTION INTRAVENOUS
Status: COMPLETED
Start: 2020-11-03 | End: 2020-11-03

## 2020-11-03 RX ORDER — PROPOFOL 10 MG/ML
INJECTION, EMULSION INTRAVENOUS CONTINUOUS PRN
Status: DISCONTINUED | OUTPATIENT
Start: 2020-11-03 | End: 2020-11-03 | Stop reason: SDUPTHER

## 2020-11-03 RX ORDER — MEPERIDINE HYDROCHLORIDE 25 MG/ML
12.5 INJECTION INTRAMUSCULAR; INTRAVENOUS; SUBCUTANEOUS EVERY 5 MIN PRN
Status: DISCONTINUED | OUTPATIENT
Start: 2020-11-03 | End: 2020-11-03 | Stop reason: HOSPADM

## 2020-11-03 RX ORDER — 0.9 % SODIUM CHLORIDE 0.9 %
500 INTRAVENOUS SOLUTION INTRAVENOUS
Status: DISCONTINUED | OUTPATIENT
Start: 2020-11-03 | End: 2020-11-03 | Stop reason: HOSPADM

## 2020-11-03 RX ORDER — CEFAZOLIN SODIUM 2 G/50ML
2 SOLUTION INTRAVENOUS
Status: COMPLETED | OUTPATIENT
Start: 2020-11-03 | End: 2020-11-03

## 2020-11-03 RX ORDER — LIDOCAINE HYDROCHLORIDE 5 MG/ML
INJECTION, SOLUTION INFILTRATION; INTRAVENOUS PRN
Status: DISCONTINUED | OUTPATIENT
Start: 2020-11-03 | End: 2020-11-03 | Stop reason: SDUPTHER

## 2020-11-03 RX ORDER — FENTANYL CITRATE 50 UG/ML
25 INJECTION, SOLUTION INTRAMUSCULAR; INTRAVENOUS EVERY 5 MIN PRN
Status: DISCONTINUED | OUTPATIENT
Start: 2020-11-03 | End: 2020-11-03 | Stop reason: HOSPADM

## 2020-11-03 RX ORDER — LABETALOL HYDROCHLORIDE 5 MG/ML
5 INJECTION, SOLUTION INTRAVENOUS EVERY 10 MIN PRN
Status: DISCONTINUED | OUTPATIENT
Start: 2020-11-03 | End: 2020-11-03 | Stop reason: HOSPADM

## 2020-11-03 RX ORDER — METOCLOPRAMIDE HYDROCHLORIDE 5 MG/ML
10 INJECTION INTRAMUSCULAR; INTRAVENOUS
Status: DISCONTINUED | OUTPATIENT
Start: 2020-11-03 | End: 2020-11-03 | Stop reason: HOSPADM

## 2020-11-03 RX ORDER — MAGNESIUM HYDROXIDE 1200 MG/15ML
LIQUID ORAL CONTINUOUS PRN
Status: COMPLETED | OUTPATIENT
Start: 2020-11-03 | End: 2020-11-03

## 2020-11-03 RX ORDER — DIPHENHYDRAMINE HYDROCHLORIDE 50 MG/ML
12.5 INJECTION INTRAMUSCULAR; INTRAVENOUS
Status: DISCONTINUED | OUTPATIENT
Start: 2020-11-03 | End: 2020-11-03 | Stop reason: HOSPADM

## 2020-11-03 RX ORDER — ONDANSETRON 2 MG/ML
4 INJECTION INTRAMUSCULAR; INTRAVENOUS
Status: DISCONTINUED | OUTPATIENT
Start: 2020-11-03 | End: 2020-11-03 | Stop reason: HOSPADM

## 2020-11-03 RX ORDER — PROPOFOL 10 MG/ML
INJECTION, EMULSION INTRAVENOUS PRN
Status: DISCONTINUED | OUTPATIENT
Start: 2020-11-03 | End: 2020-11-03 | Stop reason: SDUPTHER

## 2020-11-03 RX ADMIN — ATENOLOL 25 MG: 25 TABLET ORAL at 07:29

## 2020-11-03 RX ADMIN — LIDOCAINE HYDROCHLORIDE 40 ML: 5 INJECTION, SOLUTION INFILTRATION at 08:37

## 2020-11-03 RX ADMIN — PROPOFOL 100 MCG/KG/MIN: 10 INJECTION, EMULSION INTRAVENOUS at 08:34

## 2020-11-03 RX ADMIN — SODIUM CHLORIDE, POTASSIUM CHLORIDE, SODIUM LACTATE AND CALCIUM CHLORIDE: 600; 310; 30; 20 INJECTION, SOLUTION INTRAVENOUS at 08:30

## 2020-11-03 RX ADMIN — SODIUM CHLORIDE, SODIUM LACTATE, POTASSIUM CHLORIDE, CALCIUM CHLORIDE 1000 ML: 600; 310; 30; 20 INJECTION, SOLUTION INTRAVENOUS at 07:26

## 2020-11-03 RX ADMIN — SODIUM CHLORIDE, POTASSIUM CHLORIDE, SODIUM LACTATE AND CALCIUM CHLORIDE 1000 ML: 600; 310; 30; 20 INJECTION, SOLUTION INTRAVENOUS at 07:26

## 2020-11-03 RX ADMIN — CEFAZOLIN SODIUM 2 G: 2 SOLUTION INTRAVENOUS at 08:35

## 2020-11-03 RX ADMIN — PROPOFOL 30 MG: 10 INJECTION, EMULSION INTRAVENOUS at 08:34

## 2020-11-03 ASSESSMENT — PULMONARY FUNCTION TESTS
PIF_VALUE: 0
PIF_VALUE: 1
PIF_VALUE: 0
PIF_VALUE: 1
PIF_VALUE: 0
PIF_VALUE: 0

## 2020-11-03 NOTE — PROGRESS NOTES
Pt was taken to post op from or and dia rn states pt not awake and pox 88% on ra, called pacu and transferred to pacu, dr Andra Carpenter was at cart side, pt did open eyes when spoken to and now resting comfortably, rue elevated on pillow and iced

## 2020-11-03 NOTE — PROGRESS NOTES
Patient ID:  Annie Yu  [id number]  49 y.o.  1960  TAKEN TO PHASE 2,   ATTACHED TO MONITOR AND REPORT GIVEN TO RN.   VSS DRSG DRY AND INTACT  GLASSES IN LABELED BAG ON PATIENT CART        Electronically signed by Anitha Salguero RN on 11/3/2020

## 2020-11-03 NOTE — ANESTHESIA POSTPROCEDURE EVALUATION
Department of Anesthesiology  Postprocedure Note    Patient: Annie Yu  MRN: [id number]  YOB: 1960  Date of evaluation: 11/3/2020  Time:  9:05 AM     Procedure Summary     Date:  11/03/20 Room / Location:  [address]    Anesthesia Start:  0830 Anesthesia Stop:  0306    Procedure:  RELEASE OF RIGHT RING FINGER TRIGGER FINGER (Right Hand) Diagnosis:  (RIGHT RING FINGER TRIGGER FINGER)    Surgeon:  Mera Gee MD Responsible Provider:  Joana Tellez MD    Anesthesia Type:  Hypericum block ASA Status:  3          Anesthesia Type: Valentin block    Freedom Phase I:      Freedom Phase II:      Last vitals: Reviewed and per EMR flowsheets.        Anesthesia Post Evaluation    Patient location during evaluation: bedside  Patient participation: complete - patient participated  Level of consciousness: awake and awake and alert  Airway patency: patent  Nausea & Vomiting: no nausea and no vomiting  Complications: no  Cardiovascular status: blood pressure returned to baseline and hemodynamically stable  Respiratory status: acceptable  Hydration status: euvolemic

## 2020-11-03 NOTE — OP NOTE
Operative Note      Patient: Salud Herzog  YOB: 1960  MRN: [id number]    Date of Procedure: 11/3/2020    Pre-Op Diagnosis: RIGHT RING FINGER TRIGGER FINGER    Post-Op Diagnosis: Same       Procedure(s):  RELEASE OF RIGHT RING FINGER TRIGGER FINGER    Surgeon(s): Pau Up MD    Assistant:   Physician Assistant: Catracho Daniels PA-C    Anesthesia: Chalybeate Block    Estimated Blood Loss (mL): Minimal    Complications: None    Specimens:   * No specimens in log *    Implants:  * No implants in log *      Drains: * No LDAs found *    Findings: Tight compression of the long flexor tendons to the ring finger at the A1 pulley  Mild tenosynovial inflammation    Detailed Description of Procedure: Indications for surgery  Patient is a 60-year-old, who presents with recurrent episodes of triggering in the right ring finger. The patient had trigger finger release in the left hand. Patient had tried conservative measures and these had failed. Cortisone injection into the tendon sheath helped to a minimal extent the triggering persisted, therefore surgical intervention was discussed  Risks and benefits of surgery were discussed in detail, risks of anesthesia which is a Biers block, risk of injury to the vessel, nerve, tendon. Postoperative complications such as hematoma formation, wound infection, recurrence of the triggering, possible injury to the nerve resulting in neuroma formation, have all been discussed. Patient understands all the risks and benefits and consents for the surgical procedure    Operative note  It was brought to the operating room and Biers block was given with anesthetic services. The right upper extremity was prepped and draped using chlorhexidine. A timeout was called prior to the start of the procedure. The patient had 2 g of Ancef given prior to the start of the procedure.   The surgical incision site was marked this was 1 cm transverse incision close to the ring finger at the level of the A1 pulley. Skin and subtenons tissue were carefully cut and retracted. Blunt dissection was done the deeper tissues. The A1 pulley was carefully identified, hand-held retractors were placed. Procedure itself was done with magnifying loops, using a 15 blade, the A1 pulley was carefully cut entering into the tendon sheath. Gelatinous clear fluid was present distal to the A1 pulley. The proximal aspect of the A1 pulley was carefully decompressed using blunt tipped scissors. The tendon was now noted to be moving freely. The tendon showed an area of striation at its midportion. The tendon clearly was intact  without any compromise. The digital neurovascular structures were kept  well out of the way throughout the procedure, and these were swept away from the surgical site after entering the subcutaneous tissue  The surgical site was irrigated out. The skin was closed with interrupted nylon sutures. Skin was infiltrated with quarter percent Marcaine. Xeroform, gauze, web roll, and an Ace wrap are applied. The patient made a satisfactory recovery, postoperative instructions have been provided. She may need to take Tylenol, or anti-inflammatory medications for her discomfort. She will be evaluated in orthopedic office in approximately 2 weeks.       Electronically signed by Nikole Costa MD on 11/3/2020 at 9:06 AM

## 2020-11-03 NOTE — PROGRESS NOTES
Pt states that Dr Mike Vizcaino told her he would give her a Rx for pain med for home. Spoke with VERNON Mccarthy. He said to let the patient know that she should try tylenol and motrin at home,and if, after 3 days if she still needs pain med to call the the office. Pt informed of this.

## 2020-11-03 NOTE — ANESTHESIA PROCEDURE NOTES
Peripheral Block    Patient location during procedure: OR  Start time: 11/3/2020 8:38 AM  End time: 11/3/2020 8:42 AM  Staffing  Anesthesiologist: Katty Carreon MD  Performed: anesthesiologist   Preanesthetic Checklist  Completed: patient identified, site marked, surgical consent, pre-op evaluation, timeout performed, IV checked, risks and benefits discussed, monitors and equipment checked, anesthesia consent given, oxygen available and patient being monitored  Peripheral Block  Patient position: supine  Prep: ChloraPrep  Patient monitoring: cardiac monitor, continuous pulse ox, frequent blood pressure checks and IV access  Block type: Valentin block  Laterality: right  Injection technique: single-shot  Procedures: other  Local infiltration: lidocaine  Infiltration strength: 0.5 %  Dose: 40 mL  Provider prep: mask (Sterile probe cover)  Local infiltration: lidocaine  Assessment  Slow fractionated injection: yes  Hemodynamics: stable  Additional Notes      Reason for block: primary anesthetic

## 2020-11-04 ENCOUNTER — PATIENT MESSAGE (OUTPATIENT)
Dept: ORTHOPEDIC SURGERY | Age: 60
End: 2020-11-04

## 2020-11-04 ENCOUNTER — OFFICE VISIT (OUTPATIENT)
Dept: ENDOCRINOLOGY | Age: 60
End: 2020-11-04
Payer: COMMERCIAL

## 2020-11-04 ENCOUNTER — TELEPHONE (OUTPATIENT)
Dept: ORTHOPEDIC SURGERY | Age: 60
End: 2020-11-04

## 2020-11-04 VITALS
WEIGHT: 190 LBS | HEART RATE: 83 BPM | OXYGEN SATURATION: 97 % | SYSTOLIC BLOOD PRESSURE: 165 MMHG | BODY MASS INDEX: 35.87 KG/M2 | DIASTOLIC BLOOD PRESSURE: 90 MMHG | HEIGHT: 61 IN

## 2020-11-04 LAB
CHP ED QC CHECK: NORMAL
GLUCOSE BLD-MCNC: 182 MG/DL

## 2020-11-04 PROCEDURE — 3046F HEMOGLOBIN A1C LEVEL >9.0%: CPT | Performed by: INTERNAL MEDICINE

## 2020-11-04 PROCEDURE — G8482 FLU IMMUNIZE ORDER/ADMIN: HCPCS | Performed by: INTERNAL MEDICINE

## 2020-11-04 PROCEDURE — G8417 CALC BMI ABV UP PARAM F/U: HCPCS | Performed by: INTERNAL MEDICINE

## 2020-11-04 PROCEDURE — 2022F DILAT RTA XM EVC RTNOPTHY: CPT | Performed by: INTERNAL MEDICINE

## 2020-11-04 PROCEDURE — 3017F COLORECTAL CA SCREEN DOC REV: CPT | Performed by: INTERNAL MEDICINE

## 2020-11-04 PROCEDURE — 95250 CONT GLUC MNTR PHYS/QHP EQP: CPT | Performed by: INTERNAL MEDICINE

## 2020-11-04 PROCEDURE — G8427 DOCREV CUR MEDS BY ELIG CLIN: HCPCS | Performed by: INTERNAL MEDICINE

## 2020-11-04 PROCEDURE — 4004F PT TOBACCO SCREEN RCVD TLK: CPT | Performed by: INTERNAL MEDICINE

## 2020-11-04 PROCEDURE — 99203 OFFICE O/P NEW LOW 30 MIN: CPT | Performed by: INTERNAL MEDICINE

## 2020-11-04 RX ORDER — TRAMADOL HYDROCHLORIDE 50 MG/1
50 TABLET ORAL EVERY 6 HOURS PRN
Qty: 8 TABLET | Refills: 0 | Status: SHIPPED | OUTPATIENT
Start: 2020-11-04 | End: 2020-11-06

## 2020-11-04 RX ORDER — ATENOLOL 25 MG/1
TABLET ORAL
Qty: 90 TABLET | Refills: 1 | Status: CANCELLED | OUTPATIENT
Start: 2020-11-04

## 2020-11-04 NOTE — TELEPHONE ENCOUNTER
Pt came in explaining that she was told tramadol would be sent to her [address], but there is no Rx ready for . Pt asking if the pain medication can be sent soon. Pt reported OTC medications are not helping. Please advise.

## 2020-11-04 NOTE — PROGRESS NOTES
Subjective:      Patient ID: Sheryr Moy is a 62 y.o. female. Patient referred here for management of type 2 diabetes A1c's have been increasing from 8.6-9.2 blood sugars are averaging in the low to mid 200 range currently on Lantus 55 units at bedtime plus NovoLog 8 units with meals  Patient has been followed also by [facility] clinic over the last few years A1c been fluctuating between 8-10.7  Diabetes   She presents for her initial diabetic visit. She has type 2 diabetes mellitus. Her disease course has been fluctuating. There are no hypoglycemic associated symptoms. Pertinent negatives for diabetes include no polydipsia, no polyuria, no visual change and no weight loss. There are no hypoglycemic complications. Diabetic complications include peripheral neuropathy. Risk factors for coronary artery disease include obesity. Current diabetic treatment includes insulin injections. She is currently taking insulin pre-breakfast, pre-lunch, pre-dinner and at bedtime. Her overall blood glucose range is >200 mg/dl. (Lab Results       Component                Value               Date                       LABA1C                   9.2 (H)             10/16/2020              )       Results for Herbert Barnes (MRN [id number]) as of 11/4/2020 13:34   Ref. Range 5/1/2020 12:15 10/16/2020 11:47   Hemoglobin A1C Latest Ref Range: 4.8 - 5.9 % 8.6 (H) 9.2 (H)       Results for Herbert Barnes (MRN [id number]) as of 11/4/2020 13:34   Ref.  Range 10/27/2020 14:10 10/27/2020 16:53 11/3/2020 07:16 11/3/2020 09:26 11/4/2020 13:24   Sodium Latest Ref Range: 135 - 144 mEq/L 143       Potassium Latest Ref Range: 3.4 - 4.9 mEq/L 4.2       Chloride Latest Ref Range: 95 - 107 mEq/L 103       CO2 Latest Ref Range: 20 - 31 mEq/L 24       BUN Latest Ref Range: 6 - 20 mg/dL 23 (H)       Creatinine Latest Ref Range: 0.50 - 0.90 mg/dL 1.04 (H)       Anion Gap Latest Ref Range: 9 - 15 mEq/L 16 (H)       GFR Non- Latest Ref Range: >60  54.1 (L)       GFR  Latest Ref Range: >60  >60.0       Glucose Latest Units: mg/dL 71    182   POC Glucose Latest Ref Range: 60 - 115 mg/dl   185 (H) 162 (H)    Calcium Latest Ref Range: 8.5 - 9.9 mg/dL 10.0 (H)       WBC Latest Ref Range: 4.8 - 10.8 K/uL 9.3       RBC Latest Ref Range: 4.20 - 5.40 M/uL 4.94       Hemoglobin Quant Latest Ref Range: 12.0 - 16.0 g/dL 13.4       Hematocrit Latest Ref Range: 37.0 - 47.0 % 41.0       MCV Latest Ref Range: 82.0 - 100.0 fL 83.0       MCH Latest Ref Range: 27.0 - 31.3 pg 27.1       MCHC Latest Ref Range: 33.0 - 37.0 % 32.6 (L)       RDW Latest Ref Range: 11.5 - 14.5 % 15.3 (H)       Platelet Count Latest Ref Range: 130 - 400 K/uL 340           Results for Piper Goyal (MRN [id number]) as of 11/4/2020 13:34   Ref.  Range 5/1/2020 12:15 10/16/2020 11:47   Hemoglobin A1C Latest Ref Range: 4.8 - 5.9 % 8.6 (H) 9.2 (H)     Patient Active Problem List   Diagnosis    GERD (gastroesophageal reflux disease)    Type 2 diabetes mellitus with neurologic complication, with long-term current use of insulin (HCC)    Anemia    Mood disorder (HCC)    Chronic musculoskeletal pain    Chronic midline low back pain without sciatica    Chronic neck pain    Osteopenia    Tobacco abuse    Tachycardia    Abnormal antinuclear antibody titer    Tendinopathy    Trigger finger, right ring finger    Trigger finger, left middle finger    Vitamin D deficiency    Class 2 severe obesity due to excess calories with serious comorbidity and body mass index (BMI) of 36.0 to 36.9 in adult ([address]    Tinea pedis of right foot    Hypomagnesemia     Social History     Socioeconomic History    Marital status:      Spouse name: Not on file    Number of children: Not on file    Years of education: Not on file    Highest education level: Not on file   Occupational History    Not on file   Social Needs    Financial resource strain: Not on file   [facility] Food insecurity     Worry: Not on file     Inability: Not on file    Transportation needs     Medical: Not on file     Non-medical: Not on file   Tobacco Use    Smoking status: Current Every Day Smoker     Packs/day: 1.50     Years: 44.00     Pack years: 66.00     Types: Cigarettes     Start date: 1976    Smokeless tobacco: Never Used   Substance and Sexual Activity    Alcohol use: Yes     Comment: occasionally    Drug use: Never    Sexual activity: Not on file   Lifestyle    Physical activity     Days per week: Not on file     Minutes per session: Not on file    Stress: Not on file   Relationships    Social connections     Talks on phone: Not on file     Gets together: Not on file     Attends Christianity service: Not on file     Active member of club or organization: Not on file     Attends meetings of clubs or organizations: Not on file     Relationship status: Not on file    Intimate partner violence     Fear of current or ex partner: Not on file     Emotionally abused: Not on file     Physically abused: Not on file     Forced sexual activity: Not on file   Other Topics Concern    Not on file   Social History Narrative    Not on file     Past Surgical History:   Procedure Laterality Date     SECTION      [id number],    FINGER TRIGGER RELEASE Left 2020    TRIGGER RELEASE MIDDLE FINGER AND RING FINGER LEFT HAND performed by Roberta Steward MD at [facility] Right 11/3/2020    RELEASE OF RIGHT [address] FINGER TRIGGER FINGER performed by Roberta Steward MD at [facility]    kidney stone removal      Family History   Problem Relation Age of Onset    Diabetes Mother     Diabetes Father     Heart Attack Father      Allergies   Allergen Reactions    Bactrim [Sulfamethoxazole-Trimethoprim]     Oxycodone      Patient reports she \"was addicted to it in the past\".     Statins          Current Outpatient Medications:     vitamin D3 (CHOLECALCIFEROL) 10 MCG (400 UNIT) TABS tablet, Take 400 Units by mouth daily, Disp: , Rfl:     Turmeric 500 MG CAPS, Take by mouth 2 times daily, Disp: , Rfl:     APPLE CIDER VINEGAR PO, Take by mouth 2 times daily, Disp: , Rfl:     cyclobenzaprine (FLEXERIL) 10 MG tablet, TAKE 1 TABLET BY MOUTH NIGHTLY AS NEEDED FOR MUSCLE SPASM, Disp: , Rfl:     pregabalin (LYRICA) 100 MG capsule, TAKE 1 CAPSULE BY MOUTH TWICE DAILY FOR 90 DAYS, Disp: 180 capsule, Rfl: 0    DULoxetine (CYMBALTA) 60 MG extended release capsule, Take 1 capsule by mouth 2 times daily, Disp: 180 capsule, Rfl: 1    metFORMIN (GLUCOPHAGE) 1000 MG tablet, TAKE 1 TABLET BY MOUTH TWICE DAILY WITH MEALS, Disp: 180 tablet, Rfl: 1    BD PEN NEEDLE STEFFANIE U/F 32G X 4 MM MISC, USE 1 TO INJECT INSULIN 4 TIMES DAILY, Disp: 100 each, Rfl: 12    TRUE METRIX BLOOD GLUCOSE TEST strip, USE 1 STRIP TO CHECK GLUCOSE 4 TIMES DAILY, Disp: 100 each, Rfl: 12    esomeprazole (NEXIUM) 40 MG delayed release capsule, TAKE 1 CAPSULE BY MOUTH ONCE DAILY, Disp: 90 capsule, Rfl: 1    hydrOXYzine (VISTARIL) 25 MG capsule, hydroxyzine pamoate 25 mg capsule BID PRN anxiety, Disp: 90 capsule, Rfl: 0    acetaminophen (TYLENOL) 500 MG tablet, Take 2 tablets by mouth 3 times daily, Disp: 180 tablet, Rfl: 0    zolpidem (AMBIEN) 10 MG tablet, Take 10 mg by mouth.  Take when have trouble to sleep., Disp: , Rfl:     TRUEplus Lancets 33G MISC, USE 1 TO CHECK GLUCOSE 4 TIMES DAILY, Disp: , Rfl:     Blood Glucose Monitoring Suppl (TRUE METRIX AIR GLUCOSE METER) w/Device KIT, USE TO CHECK GLUCOSE 4 TIMES DAILY, Disp: , Rfl:     zoster recombinant adjuvanted vaccine (SHINGRIX) 50 MCG/0.5ML SUSR injection, Inject 0.5 mLs into the muscle See Admin Instructions 1 dose now and repeat in 2-6 months, Disp: 0.5 mL, Rfl: 0    Magnesium 400 MG CAPS, Take 1 capsule by mouth 2 times daily, Disp: 90 capsule, Rfl: 4    atenolol (TENORMIN) 25 MG tablet, TAKE 1 TABLET BY MOUTH ONCE DAILY, Disp: 90 tablet, Rfl: 1    BASAGLAR KWIKPEN 100 UNIT/ML injection pen, INJECT 55 UNITS SUBCUTANEOUSLY ONCE DAILY (Patient taking differently: INJECT 60 UNITS SUBCUTANEOUSLY ONCE DAILY), Disp: 15 pen, Rfl: 1    NOVOLOG FLEXPEN 100 UNIT/ML injection pen, INJECT 8 UNITS SUBCUTANEOUSLY THREE TIMES DAILY BEFORE MEAL(S), Disp: 15 pen, Rfl: 1    Review of Systems   Constitutional: Negative for weight loss. Eyes: Negative. Cardiovascular: Negative. Endocrine: Negative for polydipsia and polyuria. Musculoskeletal: Positive for arthralgias. Psychiatric/Behavioral: Positive for dysphoric mood. All other systems reviewed and are negative. Vitals:    11/04/20 1322 11/04/20 1325   BP: (!) 167/105 (!) 165/90   Pulse: 83    SpO2: 97%    Weight: 190 lb (86.2 kg)    Height: 5' 1\" (1.549 m)        Objective:   Physical Exam  Constitutional:       Appearance: Normal appearance. She is obese. HENT:      Head: Normocephalic and atraumatic. Right Ear: External ear normal.      Left Ear: External ear normal.      Nose: Nose normal.      Mouth/Throat:      Mouth: Mucous membranes are moist.   Eyes:      Extraocular Movements: Extraocular movements intact. Pupils: Pupils are equal, round, and reactive to light. Neck:      Musculoskeletal: Normal range of motion and neck supple. Cardiovascular:      Rate and Rhythm: Normal rate and regular rhythm. Pulses: Normal pulses. Heart sounds: Normal heart sounds. Pulmonary:      Breath sounds: Normal breath sounds. Abdominal:      Palpations: Abdomen is soft. Musculoskeletal: Normal range of motion. Arms:         Feet:    Skin:     General: Skin is warm. Neurological:      General: No focal deficit present. Mental Status: She is alert and oriented to person, place, and time. Psychiatric:         Mood and Affect: Mood normal.         Behavior: Behavior normal.         Assessment:       Diagnosis Orders   1.  Type 2 diabetes mellitus with other specified complication, without long-term current use of insulin (HCC)  POCT Glucose    VT CONT GLUC MNTR PHYSICIAN/QHP PROVIDED EQUIPTMENT           Plan:      Orders Placed This Encounter   Procedures    POCT Glucose    VT CONT GLUC MNTR PHYSICIAN/QHP PROVIDED EQUIPTMENT     Continue Lantus 55 units at bedtime plus Humalog 8 units with each meals will also order 2-week continuous glucose monitoring goal of 7  More than 50% of 30-minute spent patient education counseling thank you for the referral Erick Moreira MD

## 2020-11-04 NOTE — TELEPHONE ENCOUNTER
Rx sent, I told her specifically that we would not be doing any pain management for a trigger finger release. Very short course of tramadol will be prescribed but that is it for her.   Thank you

## 2020-11-05 ENCOUNTER — PATIENT MESSAGE (OUTPATIENT)
Dept: FAMILY MEDICINE CLINIC | Age: 60
End: 2020-11-05

## 2020-11-06 RX ORDER — ATENOLOL 25 MG/1
TABLET ORAL
Qty: 90 TABLET | Refills: 1 | Status: SHIPPED | OUTPATIENT
Start: 2020-11-06 | End: 2021-05-13 | Stop reason: SDUPTHER

## 2020-11-06 NOTE — TELEPHONE ENCOUNTER
Rx request   Requested Prescriptions     Pending Prescriptions Disp Refills    atenolol (TENORMIN) 25 MG tablet 90 tablet 1     Sig: TAKE 1 TABLET BY MOUTH ONCE DAILY     LOV 9/29/2020  Next Visit Date:  Future Appointments   Date Time Provider Taylor Tejada   11/9/2020  1:00 PM Minda Hardy MD [facility]   11/17/2020  2:45 PM Anahi Huang PA-C [address]   12/2/2020  1:00 PM Manuel Cardona MD [facility]

## 2020-11-06 NOTE — TELEPHONE ENCOUNTER
From: Merry Taylor  To: Esau Bhandari MD  Sent: 11/5/2020 11:12 PM EST  Subject: Prescription Question    I need a new prescription for my atenolol please, I have been out for several days now

## 2020-11-09 ENCOUNTER — VIRTUAL VISIT (OUTPATIENT)
Dept: FAMILY MEDICINE CLINIC | Age: 60
End: 2020-11-09
Payer: COMMERCIAL

## 2020-11-09 PROBLEM — E83.42 HYPOMAGNESEMIA: Chronic | Status: ACTIVE | Noted: 2020-11-09

## 2020-11-09 PROCEDURE — G8427 DOCREV CUR MEDS BY ELIG CLIN: HCPCS | Performed by: FAMILY MEDICINE

## 2020-11-09 PROCEDURE — 99214 OFFICE O/P EST MOD 30 MIN: CPT | Performed by: FAMILY MEDICINE

## 2020-11-09 PROCEDURE — 3017F COLORECTAL CA SCREEN DOC REV: CPT | Performed by: FAMILY MEDICINE

## 2020-11-10 NOTE — PROGRESS NOTES
Otto Phoenix is a 62 y.o. female evaluated via telephone on 2020. Consent:  She and/or health care decision maker is aware that that she may receive a bill for this telephone service, depending on her insurance coverage, and has provided verbal consent to proceed: Yes      Documentation:  I communicated with the patient and/or health care decision maker about see above. Details of this discussion including any medical advice provided: see above      I affirm this is a Patient Initiated Episode with an Established Patient who has not had a related appointment within my department in the past 7 days or scheduled within the next 24 hours. Total Time: minutes: 11-20 minutes    Note: not billable if this call serves to triage the patient into an appointment for the relevant concern      Curtis ALARCON     2020    TELEHEALTH EVALUATION -- Audio (During GZDFR-23 public health emergency)    HPI:    Otto Phoenix (:  1960) has requested an audio evaluation for the following concern(s):      Patient is here for DM f/u. Sugars have been poorly controlled and patient has not been experiencing hyper- or hypoglycemic symptoms. Compliance with meds is good and there are no side effects. Patient exercises occasionally and tries to eat healthy. Is up to date on foot and eye exams and immunizations. Most recent A1C was 9.2. Has seen Dr. Anamaria Loja who recommends insulin pump. Has Sweta in place right now. Had trigger finger surgery last week. Tolerated well. Hypomag on labs. Calcium slightly elevated. CKD 3a. Microalbuminuria present. Complains of neck pain, bilateral shoulder, N/T with hands and fingers which occurs in the mornings and only when shoulder are hurting. Ibuprofen 800 mg and the swelling, pain and N/T go down. Review of Systems No fevers, chills, sweats. No unintended weight loss.   No abdominal pain, nausea, vomiting, diarrhea, constipation, bloody stools, Neri Lucio PA-C   zolpidem (AMBIEN) 10 MG tablet Take 10 mg by mouth. Take when have trouble to sleep. Yes Historical Provider, MD   TRUEplus Lancets 33G MISC USE 1 TO CHECK GLUCOSE 4 TIMES DAILY Yes Historical Provider, MD   Blood Glucose Monitoring Suppl (TRUE METRIX AIR GLUCOSE METER) w/Device KIT USE TO CHECK GLUCOSE 4 TIMES DAILY Yes Historical Provider, MD   vitamin D3 (CHOLECALCIFEROL) 10 MCG (400 UNIT) TABS tablet Take 400 Units by mouth daily  Historical Provider, MD   NOVOLOG FLEXPEN 100 UNIT/ML injection pen INJECT 8 UNITS SUBCUTANEOUSLY THREE TIMES DAILY BEFORE MEAL(S)  Mirna Boykin MD       Social History     Tobacco Use    Smoking status: Current Every Day Smoker     Packs/day: 1.50     Years: 44.00     Pack years: 66.00     Types: Cigarettes     Start date: 1976    Smokeless tobacco: Never Used   Substance Use Topics    Alcohol use: Yes     Comment: occasionally    Drug use: Never        Allergies   Allergen Reactions    Bactrim [Sulfamethoxazole-Trimethoprim]     Oxycodone      Patient reports she \"was addicted to it in the past\".     Statins    ,   Past Medical History:   Diagnosis Date    Arthritis     Bulging lumbar disc     lower back    Diabetes mellitus ([facility] [address]     Fibromyalgia     Hypomagnesemia 2020   ,   Past Surgical History:   Procedure Laterality Date     SECTION      [id number],    FINGER TRIGGER RELEASE Left 2020    TRIGGER RELEASE MIDDLE FINGER AND RING FINGER LEFT HAND performed by Fletcher Mobley MD at [address] [place] [address] Right 11/3/2020    RELEASE OF RIGHT RING FINGER TRIGGER FINGER performed by Fletcher Mobley MD at [facility]    kidney stone removal    ,   Social History     Tobacco Use    Smoking status: Current Every Day Smoker     Packs/day: 1.50     Years: 44.00     Pack years: 66.00     Types: Cigarettes     Start date: 1976    Smokeless tobacco: Never Used Substance Use Topics    Alcohol use: Yes     Comment: occasionally    Drug use: Never   ,   Family History   Problem Relation Age of Onset    Diabetes Mother     Diabetes Father     Heart Attack Father        PHYSICAL EXAMINATION:  [ INSTRUCTIONS:  \"[x]\" Indicates a positive item  \"[]\" Indicates a negative item  -- DELETE ALL ITEMS NOT EXAMINED]  Vital Signs: unavailable    Patient appears to be alert and oriented to person, place, time, situation and is in no acute distress. Respiratory effort appears normal. Mood appears stable and speech and thought are grossly normal.    ASSESSMENT/PLAN:  Bethany Rojas was seen today for diabetes and health maintenance. Diagnoses and all orders for this visit:    Stage 3a chronic kidney disease  Comments:  reviewed CKD at length. No NSAIDs  Orders:  -     Basic Metabolic Panel; Future    Screen for colon cancer  Comments:  Referred for dx scope d/t pos cologuard  Orders:  -     Cologuard (For External Results Only)    Positive colorectal cancer screening using Cologuard test  Comments:  referred for scope  Orders:  -     Dora Sherman MD, Gastroenterology, [place]    Hypomagnesemia  Comments:  supplement and follow labs  Orders:  -     Magnesium 400 MG CAPS; Take 1 capsule by mouth 2 times daily  -     Magnesium; Future    Personal history of nicotine dependence   -     CT lung screen [Initial/Annual]; Future    Encounter for screening for lung cancer  -     CT lung screen [Initial/Annual]; Future    Need for shingles vaccine  -     zoster recombinant adjuvanted vaccine [facility]) 50 MCG/0.5ML SUSR injection; Inject 0.5 mLs into the muscle See Admin Instructions 1 dose now and repeat in 2-6 months          Return in about 4 months (around 3/9/2021) for chronic care and shoulder and neck pain  with N/T in hands - OVGuanako De Paz is a 62 y.o. female being evaluated by a Virtual Visit (telephonic visit) encounter to address concerns as mentioned above. A caregiver was present when appropriate. Due to this being a TeleHealth encounter (During XFXRV-81 public health emergency), evaluation of the following organ systems was limited: Vitals/Constitutional/EENT/Resp/CV/GI//MS/Neuro/Skin/Heme-Lymph-Imm. Pursuant to the emergency declaration under the [address] [place] [address] and the [employer] Act, this Virtual Visit was conducted with patient's (and/or legal guardian's) consent, to reduce the patient's risk of exposure to COVID-19 and provide necessary medical care. The patient (and/or legal guardian) has also been advised to contact this office for worsening conditions or problems, and seek emergency medical treatment and/or call 911 if deemed necessary. Services were provided through a telephonic synchronous discussion virtually to substitute for in-person clinic visit. Patient and provider were located at their individual homes. --Leonardo Murray MD on 11/10/2020 at 5:05 PM    An electronic signature was used to authenticate this note.

## 2020-11-15 ASSESSMENT — ENCOUNTER SYMPTOMS
EYES NEGATIVE: 1
VISUAL CHANGE: 0

## 2020-11-23 ENCOUNTER — TELEPHONE (OUTPATIENT)
Dept: ENDOSCOPY | Age: 60
End: 2020-11-23

## 2020-11-23 ENCOUNTER — OFFICE VISIT (OUTPATIENT)
Dept: ORTHOPEDIC SURGERY | Age: 60
End: 2020-11-23

## 2020-11-23 VITALS
WEIGHT: 191 LBS | TEMPERATURE: 96.8 F | BODY MASS INDEX: 36.06 KG/M2 | HEIGHT: 61 IN | HEART RATE: 79 BPM | OXYGEN SATURATION: 99 %

## 2020-11-23 PROCEDURE — 99024 POSTOP FOLLOW-UP VISIT: CPT | Performed by: PHYSICIAN ASSISTANT

## 2020-11-23 ASSESSMENT — ENCOUNTER SYMPTOMS
SHORTNESS OF BREATH: 0
COLOR CHANGE: 0
DIARRHEA: 0
BACK PAIN: 0
NAUSEA: 0
WHEEZING: 0
STRIDOR: 0
CONSTIPATION: 0
VOMITING: 0

## 2020-11-23 NOTE — PROGRESS NOTES
[facility]      Subjective:      Chief Complaint   Patient presents with    Post-Op Check     post op        HPI: Merry Taylor is a 62 y.o. female who is here 2 weeks postop trigger finger release on the right. Incision looks good.   Relief of immediate symptoms    Past Medical History:   Diagnosis Date    Arthritis     Bulging lumbar disc     lower back    Diabetes mellitus ([address]     Fibromyalgia     Hypomagnesemia 2020      Past Surgical History:   Procedure Laterality Date     SECTION      [id number],    FINGER TRIGGER RELEASE Left 2020    TRIGGER RELEASE MIDDLE FINGER AND RING FINGER LEFT HAND performed by Pamela Pierce MD at [address] [place] [address] Right 11/3/2020    RELEASE OF RIGHT RING FINGER TRIGGER FINGER performed by Paemla Pierec MD at [facility]    kidney stone removal      Social History     Socioeconomic History    Marital status:      Spouse name: Not on file    Number of children: Not on file    Years of education: Not on file    Highest education level: Not on file   Occupational History    Not on file   Social Needs    Financial resource strain: Not on file    Food insecurity     Worry: Not on file     Inability: Not on file    Transportation needs     Medical: Not on file     Non-medical: Not on file   Tobacco Use    Smoking status: Current Every Day Smoker     Packs/day: 1.50     Years: 44.00     Pack years: 66.00     Types: Cigarettes     Start date: 1976    Smokeless tobacco: Never Used   Substance and Sexual Activity    Alcohol use: Yes     Comment: occasionally    Drug use: Never    Sexual activity: Not on file   Lifestyle    Physical activity     Days per week: Not on file     Minutes per session: Not on file    Stress: Not on file   Relationships    Social connections     Talks on phone: Not on file     Gets together: Not on file Attends Jewish service: Not on file     Active member of club or organization: Not on file     Attends meetings of clubs or organizations: Not on file     Relationship status: Not on file    Intimate partner violence     Fear of current or ex partner: Not on file     Emotionally abused: Not on file     Physically abused: Not on file     Forced sexual activity: Not on file   Other Topics Concern    Not on file   Social History Narrative    Not on file     Family History   Problem Relation Age of Onset    Diabetes Mother     Diabetes Father     Heart Attack Father      Allergies   Allergen Reactions    Bactrim [Sulfamethoxazole-Trimethoprim]     Oxycodone      Patient reports she \"was addicted to it in the past\".     Statins      Current Outpatient Medications on File Prior to Visit   Medication Sig Dispense Refill    zoster recombinant adjuvanted vaccine (SHINGRIX) 50 MCG/0.5ML SUSR injection Inject 0.5 mLs into the muscle See Admin Instructions 1 dose now and repeat in 2-6 months 0.5 mL 0    Magnesium 400 MG CAPS Take 1 capsule by mouth 2 times daily 90 capsule 4    atenolol (TENORMIN) 25 MG tablet TAKE 1 TABLET BY MOUTH ONCE DAILY 90 tablet 1    vitamin D3 (CHOLECALCIFEROL) 10 MCG (400 UNIT) TABS tablet Take 400 Units by mouth daily      Turmeric 500 MG CAPS Take by mouth 2 times daily      APPLE CIDER VINEGAR PO Take by mouth 2 times daily      cyclobenzaprine (FLEXERIL) 10 MG tablet TAKE 1 TABLET BY MOUTH NIGHTLY AS NEEDED FOR MUSCLE SPASM      pregabalin (LYRICA) 100 MG capsule TAKE 1 CAPSULE BY MOUTH TWICE DAILY FOR 90 DAYS 180 capsule 0    BASAGLAR KWIKPEN 100 UNIT/ML injection pen INJECT 55 UNITS SUBCUTANEOUSLY ONCE DAILY (Patient taking differently: INJECT 60 UNITS SUBCUTANEOUSLY ONCE DAILY) 15 pen 1    NOVOLOG FLEXPEN 100 UNIT/ML injection pen INJECT 8 UNITS SUBCUTANEOUSLY THREE TIMES DAILY BEFORE MEAL(S) 15 pen 1    DULoxetine (CYMBALTA) 60 MG extended release capsule Take 1 capsule by mouth 2 times daily 180 capsule 1    metFORMIN (GLUCOPHAGE) 1000 MG tablet TAKE 1 TABLET BY MOUTH TWICE DAILY WITH MEALS 180 tablet 1    BD PEN NEEDLE STEFFANIE U/F 32G X 4 MM MISC USE 1 TO INJECT INSULIN 4 TIMES DAILY 100 each 12    TRUE METRIX BLOOD GLUCOSE TEST strip USE 1 STRIP TO CHECK GLUCOSE 4 TIMES DAILY 100 each 12    esomeprazole (NEXIUM) 40 MG delayed release capsule TAKE 1 CAPSULE BY MOUTH ONCE DAILY 90 capsule 1    hydrOXYzine (VISTARIL) 25 MG capsule hydroxyzine pamoate 25 mg capsule BID PRN anxiety 90 capsule 0    acetaminophen (TYLENOL) 500 MG tablet Take 2 tablets by mouth 3 times daily 180 tablet 0    zolpidem (AMBIEN) 10 MG tablet Take 10 mg by mouth. Take when have trouble to sleep.  TRUEplus Lancets 33G MISC USE 1 TO CHECK GLUCOSE 4 TIMES DAILY      Blood Glucose Monitoring Suppl (TRUE METRIX AIR GLUCOSE METER) w/Device KIT USE TO CHECK GLUCOSE 4 TIMES DAILY       No current facility-administered medications on file prior to visit. Review of Systems   Constitutional: Negative for appetite change and fever. Respiratory: Negative for shortness of breath, wheezing and stridor. Cardiovascular: Negative for chest pain and palpitations. Gastrointestinal: Negative for constipation, diarrhea, nausea and vomiting. Musculoskeletal: Negative for arthralgias, back pain, joint swelling, myalgias and neck pain. Skin: Negative for color change and rash. Neurological: Negative for dizziness, speech difficulty, weakness and light-headedness. Objective: There were no vitals taken for this visit.     General: WDWN, well appearing, in no distress   Skin: without breakdown, rash, normal in color    Ortho Exam  Well-healing incision no erythema or discharge    Radiographs and Laboratory Studies:        Laboratory Studies:   Lab Results   Component Value Date    WBC 9.3 10/27/2020    HGB 13.4 10/27/2020    HCT 41.0 10/27/2020    MCV 83.0 10/27/2020     10/27/2020     Lab Results   Component Value Date    SEDRATE 61 (H) 05/01/2020     Lab Results   Component Value Date    CRP 29.6 (H) 05/01/2020       Assessment and Plan:      Diagnosis Orders   1. Trigger finger, right ring finger       Sutures removed the patient herself. No remnants visible on exam.  Incision looks great. No longer having any symptoms that they were having before surgery. Having issues instructed to call our office     The above plan was discussed in thorough detail with Dr. Tati James and the patient. No orders of the defined types were placed in this encounter. No orders of the defined types were placed in this encounter. No follow-ups on file.     Brenda Ruiz PA-C  [facility]  [phone]

## 2020-12-02 ENCOUNTER — OFFICE VISIT (OUTPATIENT)
Dept: ENDOCRINOLOGY | Age: 60
End: 2020-12-02
Payer: COMMERCIAL

## 2020-12-02 VITALS
SYSTOLIC BLOOD PRESSURE: 138 MMHG | HEIGHT: 61 IN | WEIGHT: 188 LBS | DIASTOLIC BLOOD PRESSURE: 83 MMHG | HEART RATE: 87 BPM | OXYGEN SATURATION: 96 % | BODY MASS INDEX: 35.5 KG/M2

## 2020-12-02 LAB
ANION GAP SERPL CALCULATED.3IONS-SCNC: 14 MEQ/L (ref 9–15)
BUN BLDV-MCNC: 14 MG/DL (ref 6–20)
CALCIUM SERPL-MCNC: 11.2 MG/DL (ref 8.5–9.9)
CHLORIDE BLD-SCNC: 100 MEQ/L (ref 95–107)
CO2: 27 MEQ/L (ref 20–31)
CREAT SERPL-MCNC: 1.2 MG/DL (ref 0.5–0.9)
GFR AFRICAN AMERICAN: 55.5
GFR NON-AFRICAN AMERICAN: 45.9
GLUCOSE BLD-MCNC: 228 MG/DL (ref 70–99)
MAGNESIUM: 2 MG/DL (ref 1.7–2.4)
POTASSIUM SERPL-SCNC: 3.5 MEQ/L (ref 3.4–4.9)
SODIUM BLD-SCNC: 141 MEQ/L (ref 135–144)

## 2020-12-02 PROCEDURE — 4004F PT TOBACCO SCREEN RCVD TLK: CPT | Performed by: INTERNAL MEDICINE

## 2020-12-02 PROCEDURE — G8482 FLU IMMUNIZE ORDER/ADMIN: HCPCS | Performed by: INTERNAL MEDICINE

## 2020-12-02 PROCEDURE — 2022F DILAT RTA XM EVC RTNOPTHY: CPT | Performed by: INTERNAL MEDICINE

## 2020-12-02 PROCEDURE — 3046F HEMOGLOBIN A1C LEVEL >9.0%: CPT | Performed by: INTERNAL MEDICINE

## 2020-12-02 PROCEDURE — 99213 OFFICE O/P EST LOW 20 MIN: CPT | Performed by: INTERNAL MEDICINE

## 2020-12-02 PROCEDURE — G8417 CALC BMI ABV UP PARAM F/U: HCPCS | Performed by: INTERNAL MEDICINE

## 2020-12-02 PROCEDURE — 3017F COLORECTAL CA SCREEN DOC REV: CPT | Performed by: INTERNAL MEDICINE

## 2020-12-02 PROCEDURE — G8427 DOCREV CUR MEDS BY ELIG CLIN: HCPCS | Performed by: INTERNAL MEDICINE

## 2020-12-02 RX ORDER — INSULIN GLARGINE 100 [IU]/ML
INJECTION, SOLUTION SUBCUTANEOUS
Qty: 15 PEN | Refills: 1 | Status: SHIPPED | OUTPATIENT
Start: 2020-12-02 | End: 2021-02-08 | Stop reason: SDUPTHER

## 2020-12-02 RX ORDER — INSULIN ASPART 100 [IU]/ML
INJECTION, SOLUTION INTRAVENOUS; SUBCUTANEOUS
Qty: 15 PEN | Refills: 1 | Status: SHIPPED | OUTPATIENT
Start: 2020-12-02 | End: 2021-07-07 | Stop reason: ALTCHOICE

## 2020-12-02 RX ORDER — PREGABALIN 100 MG/1
CAPSULE ORAL
Qty: 180 CAPSULE | Refills: 0 | Status: SHIPPED | OUTPATIENT
Start: 2020-12-02 | End: 2021-03-31 | Stop reason: SDUPTHER

## 2020-12-02 RX ORDER — ESOMEPRAZOLE MAGNESIUM 40 MG/1
CAPSULE, DELAYED RELEASE ORAL
Qty: 90 CAPSULE | Refills: 1 | Status: SHIPPED | OUTPATIENT
Start: 2020-12-02 | End: 2021-07-05 | Stop reason: SDUPTHER

## 2020-12-02 NOTE — TELEPHONE ENCOUNTER
Rx request   Requested Prescriptions     Pending Prescriptions Disp Refills    esomeprazole (NEXIUM) 40 MG delayed release capsule 90 capsule 1     Sig: TAKE 1 CAPSULE BY MOUTH ONCE DAILY    pregabalin (LYRICA) 100 MG capsule 180 capsule 0     Sig: TAKE 1 CAPSULE BY MOUTH TWICE DAILY FOR 90 DAYS     LOV 11/9/2020  Next Visit Date:  Future Appointments   Date Time Provider [facility] [place]   12/2/2020  1:00 PM Trevin Crocker MD [facility]   12/2/2020  2:00 PM ASMITA Roberts [facility] [address] [place] [address]   3/11/2021 11:30 AM Lorrie Jean Baptiste MD [address] [place] [address]

## 2020-12-02 NOTE — PROGRESS NOTES
Subjective:      Patient ID: Lissy Tubbs is a 62 y.o. female. Follow-up on type 2 diabetes patient still trying to get OmniPod insulin pump does not want to use tubing insulin pump which includes [employer]  Diabetes   She presents for her follow-up diabetic visit. She has type 2 diabetes mellitus. There are no hypoglycemic complications. Symptoms are stable. Risk factors for coronary artery disease include obesity. Current diabetic treatment includes insulin injections. She is currently taking insulin pre-breakfast, pre-lunch, pre-dinner and at bedtime. Her overall blood glucose range is 130-140 mg/dl. (Reviewed 2-week CGM report from [facility] average blood sugar 136 Target range 55% high was 24% very high was 3% no was 10% very low was 8% blood sugars are lower between 6 AM to 6 PM and other times of the day they are higher)       Patient Active Problem List   Diagnosis    GERD (gastroesophageal reflux disease)    Type 2 diabetes mellitus with neurologic complication, with long-term current use of insulin ([facility])    Anemia    Mood disorder ([facility])    Chronic musculoskeletal pain    Chronic midline low back pain without sciatica    Chronic neck pain    Osteopenia    Tobacco abuse    Tachycardia    Abnormal antinuclear antibody titer    Tendinopathy    Trigger finger, right ring finger    Trigger finger, left middle finger    Vitamin D deficiency    Class 2 severe obesity due to excess calories with serious comorbidity and body mass index (BMI) of 36.0 to 36.9 in adult ([facility])    Tinea pedis of right foot    Hypomagnesemia     Allergies   Allergen Reactions    Bactrim [Sulfamethoxazole-Trimethoprim]     Oxycodone      Patient reports she \"was addicted to it in the past\".  Statins          Review of Systems   All other systems reviewed and are negative.       Vitals:    12/02/20 1311   BP: 138/83   Pulse: 87   SpO2: 96%   Weight: 188 lb (85.3 kg)   Height: 5' 1\" (1.549 m)

## 2020-12-14 ENCOUNTER — TELEPHONE (OUTPATIENT)
Dept: CASE MANAGEMENT | Age: 60
End: 2020-12-14

## 2020-12-18 ENCOUNTER — HOSPITAL ENCOUNTER (OUTPATIENT)
Dept: CT IMAGING | Age: 60
Discharge: HOME | End: 2020-12-20
Payer: MEDICARE

## 2020-12-18 PROCEDURE — G0297 LDCT FOR LUNG CA SCREEN: HCPCS

## 2020-12-23 ENCOUNTER — VIRTUAL VISIT (OUTPATIENT)
Dept: FAMILY MEDICINE CLINIC | Age: 60
End: 2020-12-23
Payer: MEDICARE

## 2020-12-23 PROBLEM — N26.1 ATROPHIC KIDNEY: Chronic | Status: ACTIVE | Noted: 2020-12-23

## 2020-12-23 PROCEDURE — 99214 OFFICE O/P EST MOD 30 MIN: CPT | Performed by: FAMILY MEDICINE

## 2020-12-23 NOTE — PROGRESS NOTES
Prior to Visit Medications    Medication Sig Taking?  Authorizing Provider   esomeprazole (NEXIUM) 40 MG delayed release capsule TAKE 1 CAPSULE BY MOUTH ONCE DAILY Yes Brian Velázquez MD   pregabalin (LYRICA) 100 MG capsule TAKE 1 CAPSULE BY MOUTH TWICE DAILY FOR 90 DAYS Yes Brian Velázquez MD   BASAGLAR KWIKPEN 100 UNIT/ML injection pen INJECT 65 UNITS SUBCUTANEOUSLY ONCE DAILY Yes Sheryl Romero MD   NOVOLOG FLEXPEN 100 UNIT/ML injection pen INJECT 8-12  UNITS SUBCUTANEOUSLY THREE TIMES DAILY BEFORE MEAL(S) Yes Sheryl Romero MD   zoster recombinant adjuvanted vaccine (SHINGRIX) 50 MCG/0.5ML SUSR injection Inject 0.5 mLs into the muscle See Admin Instructions 1 dose now and repeat in 2-6 months Yes Brian Velázquez MD   Magnesium 400 MG CAPS Take 1 capsule by mouth 2 times daily Yes Brian Velázquez MD   atenolol (TENORMIN) 25 MG tablet TAKE 1 TABLET BY MOUTH ONCE DAILY Yes Brian Velázquez MD   vitamin D3 (CHOLECALCIFEROL) 10 MCG (400 UNIT) TABS tablet Take 400 Units by mouth daily Yes Historical Provider, MD   Turmeric 500 MG CAPS Take by mouth 2 times daily Yes Historical Provider, MD   APPLE CIDER VINEGAR PO Take by mouth 2 times daily Yes Historical Provider, MD   cyclobenzaprine (FLEXERIL) 10 MG tablet TAKE 1 TABLET BY MOUTH NIGHTLY AS NEEDED FOR MUSCLE SPASM Yes Historical Provider, MD   DULoxetine (CYMBALTA) 60 MG extended release capsule Take 1 capsule by mouth 2 times daily Yes Brian Velázquez MD   metFORMIN (GLUCOPHAGE) 1000 MG tablet TAKE 1 TABLET BY MOUTH TWICE DAILY WITH MEALS Yes Brian Velázquez MD   BD PEN NEEDLE STEFFANIE U/F 32G X 4 MM MISC USE 1 TO INJECT INSULIN 4 TIMES DAILY Yes Brian Velázquez MD   TRUE METRIX BLOOD GLUCOSE TEST strip USE 1 STRIP TO CHECK GLUCOSE 4 TIMES DAILY Yes Brian Velázquez MD Mendoza Byers is a 62 y.o. female being evaluated by a Virtual Visit (telephonic visit) encounter to address concerns as mentioned above. A caregiver was present when appropriate. Due to this being a TeleHealth encounter (During ZQMIB-62 public health emergency), evaluation of the following organ systems was limited: Vitals/Constitutional/EENT/Resp/CV/GI//MS/Neuro/Skin/Heme-Lymph-Imm. Pursuant to the emergency declaration under the [address] [place] [address], [address] [place] [address] and the [employer] and [employer] Act, this Virtual Visit was conducted with patient's (and/or legal guardian's) consent, to reduce the patient's risk of exposure to COVID-19 and provide necessary medical care. The patient (and/or legal guardian) has also been advised to contact this office for worsening conditions or problems, and seek emergency medical treatment and/or call 911 if deemed necessary. Services were provided through a telephonic synchronous discussion virtually to substitute for in-person clinic visit. Patient and provider were located at their individual homes. --Clari Guerrero MD on 12/23/2020 at 2:42 PM    An electronic signature was used to authenticate this note.

## 2020-12-30 ENCOUNTER — NURSE ONLY (OUTPATIENT)
Dept: PRIMARY CARE CLINIC | Age: 60
End: 2020-12-30

## 2020-12-30 DIAGNOSIS — Z20.822 SUSPECTED COVID-19 VIRUS INFECTION: ICD-10-CM

## 2021-01-01 LAB
SARS-COV-2: NOT DETECTED
SOURCE: NORMAL

## 2021-01-19 ENCOUNTER — PATIENT MESSAGE (OUTPATIENT)
Dept: FAMILY MEDICINE CLINIC | Age: 61
End: 2021-01-19

## 2021-01-20 ENCOUNTER — TELEPHONE (OUTPATIENT)
Dept: FAMILY MEDICINE CLINIC | Age: 61
End: 2021-01-20

## 2021-01-20 DIAGNOSIS — E55.9 VITAMIN D DEFICIENCY: ICD-10-CM

## 2021-01-20 DIAGNOSIS — D51.9 ANEMIA DUE TO VITAMIN B12 DEFICIENCY, UNSPECIFIED B12 DEFICIENCY TYPE: ICD-10-CM

## 2021-01-20 DIAGNOSIS — E83.42 HYPOMAGNESEMIA: Primary | Chronic | ICD-10-CM

## 2021-01-20 NOTE — TELEPHONE ENCOUNTER
Patient request Vit D, B12, Iron, and Magnesium labs. Orders are pended. If not appropriate, please let us know to schedule appt.

## 2021-02-03 ENCOUNTER — HOSPITAL ENCOUNTER (OUTPATIENT)
Dept: ULTRASOUND IMAGING | Age: 61
Discharge: HOME | End: 2021-02-05
Payer: MEDICARE

## 2021-02-03 DIAGNOSIS — N18.31 STAGE 3A CHRONIC KIDNEY DISEASE (HCC): ICD-10-CM

## 2021-02-03 DIAGNOSIS — N26.1 ATROPHIC KIDNEY: Chronic | ICD-10-CM

## 2021-02-03 PROCEDURE — 76775 US EXAM ABDO BACK WALL LIM: CPT

## 2021-02-08 ENCOUNTER — VIRTUAL VISIT (OUTPATIENT)
Dept: ENDOCRINOLOGY | Age: 61
End: 2021-02-08
Payer: MEDICARE

## 2021-02-08 DIAGNOSIS — E11.69 TYPE 2 DIABETES MELLITUS WITH OTHER SPECIFIED COMPLICATION, WITH LONG-TERM CURRENT USE OF INSULIN (HCC): Primary | ICD-10-CM

## 2021-02-08 DIAGNOSIS — Z79.4 TYPE 2 DIABETES MELLITUS WITH OTHER SPECIFIED COMPLICATION, WITH LONG-TERM CURRENT USE OF INSULIN (HCC): Primary | ICD-10-CM

## 2021-02-08 PROCEDURE — 99442 PR PHYS/QHP TELEPHONE EVALUATION 11-20 MIN: CPT | Performed by: INTERNAL MEDICINE

## 2021-02-08 RX ORDER — INSULIN GLARGINE 100 [IU]/ML
INJECTION, SOLUTION SUBCUTANEOUS
Qty: 15 PEN | Refills: 1
Start: 2021-02-08 | End: 2021-05-10 | Stop reason: SDUPTHER

## 2021-02-08 NOTE — PROGRESS NOTES
2021    TELEHEALTH EVALUATION -- Audio/Visual (During VRVER-81 public health emergency)    Due to [place] 19 outbreak, patient's office visit was converted to a virtual visit. Patient was contacted and agreed to proceed with a virtual visit via Telephone Visit  The risks and benefits of converting to a virtual visit were discussed in light of the current infectious disease epidemic. Patient also understood that insurance coverage and co-pays are up to their individual insurance plans. HPI: Telephone visit patient was at home I was at my office    Nathan Lam (:  1960) has requested an audio/video evaluation for the following concern(s):    Follow-up on type 2 diabetes patient on Basaglar 60 units at bedtime loaded from 65 she is not taking NovoLog with each meals does not eat 3 meals a day blood sugars are staying mostly in the mid 100 range last hemoglobin A1c 2020  Requesting a refill of Metformin    Results for Beatriz Garcia (MRN [id number]) as of 2021 13:37   Ref.  Range 2020 12:15 10/16/2020 11:47   Hemoglobin A1C Latest Ref Range: 4.8 - 5.9 % 8.6 (H) 9.2 (H)     Patient Active Problem List   Diagnosis    GERD (gastroesophageal reflux disease)    Type 2 diabetes mellitus with neurologic complication, with long-term current use of insulin (HCC)    Anemia    Mood disorder (HCC)    Chronic musculoskeletal pain    Chronic midline low back pain without sciatica    Chronic neck pain    Osteopenia    Tobacco abuse    Tachycardia    Abnormal antinuclear antibody titer    Tendinopathy    Trigger finger, right ring finger    Trigger finger, left middle finger    Vitamin D deficiency    Class 2 severe obesity due to excess calories with serious comorbidity and body mass index (BMI) of 36.0 to 36.9 in adult ([facility]    Tinea pedis of right foot    Hypomagnesemia    Atrophic kidney         Review of Systems    Prior to Visit Medications Medication Sig Taking?  Authorizing Provider   esomeprazole (NEXIUM) 40 MG delayed release capsule TAKE 1 CAPSULE BY MOUTH ONCE DAILY  Bethene Brunner, MD   pregabalin (LYRICA) 100 MG capsule TAKE 1 CAPSULE BY MOUTH TWICE DAILY FOR 90 DAYS  Bethene Brunner, MD   BASAGLAR KWIKPEN 100 UNIT/ML injection pen INJECT 65 UNITS SUBCUTANEOUSLY ONCE DAILY  Ashok Kolb MD   NOVOLOG FLEXPEN 100 UNIT/ML injection pen INJECT 8-12  UNITS SUBCUTANEOUSLY THREE TIMES DAILY BEFORE MEAL(S)  Ashok Kolb MD   zoster recombinant adjuvanted vaccine (SHINGRIX) 50 MCG/0.5ML SUSR injection Inject 0.5 mLs into the muscle See Admin Instructions 1 dose now and repeat in 2-6 months  Bethene Brunner, MD   Magnesium 400 MG CAPS Take 1 capsule by mouth 2 times daily  Bethene Brunner, MD   atenolol (TENORMIN) 25 MG tablet TAKE 1 TABLET BY MOUTH ONCE DAILY  Bethene Brunner, MD   vitamin D3 (CHOLECALCIFEROL) 10 MCG (400 UNIT) TABS tablet Take 400 Units by mouth daily  Historical Provider, MD   Turmeric 500 MG CAPS Take by mouth 2 times daily  Historical Provider, MD   APPLE CIDER VINEGAR PO Take by mouth 2 times daily  Historical Provider, MD   cyclobenzaprine (FLEXERIL) 10 MG tablet TAKE 1 TABLET BY MOUTH NIGHTLY AS NEEDED FOR MUSCLE SPASM  Historical Provider, MD   DULoxetine (CYMBALTA) 60 MG extended release capsule Take 1 capsule by mouth 2 times daily  Bethene Brunner, MD   metFORMIN (GLUCOPHAGE) 1000 MG tablet TAKE 1 TABLET BY MOUTH TWICE DAILY WITH MEALS  Bethene Brunner, MD   BD PEN NEEDLE STEFFANIE U/F 32G X 4 MM MISC USE 1 TO INJECT INSULIN 4 TIMES DAILY  Bethene Brunner, MD   TRUE METRIX BLOOD GLUCOSE TEST strip USE 1 STRIP TO CHECK GLUCOSE 4 TIMES DAILY  Bethene Brunner, MD   hydrOXYzine (VISTARIL) 25 MG capsule hydroxyzine pamoate 25 mg capsule BID PRN anxiety  Bethene Brunner, MD acetaminophen (TYLENOL) 500 MG tablet Take 2 tablets by mouth 3 times daily  Kaveh Costa PA-C   zolpidem (AMBIEN) 10 MG tablet Take 10 mg by mouth. Take when have trouble to sleep. Historical Provider, MD   TRUEplus Lancets 33G MISC USE 1 TO CHECK GLUCOSE 4 TIMES DAILY  Historical Provider, MD   Blood Glucose Monitoring Suppl (TRUE METRIX AIR GLUCOSE METER) w/Device KIT USE TO CHECK GLUCOSE 4 TIMES DAILY  Historical Provider, MD       Social History     Tobacco Use    Smoking status: Current Every Day Smoker     Packs/day: 1.50     Years: 44.00     Pack years: 66.00     Types: Cigarettes     Start date: 5/1/1976    Smokeless tobacco: Never Used   Substance Use Topics    Alcohol use: Yes     Comment: occasionally    Drug use: Never            PHYSICAL EXAMINATION:  [ INSTRUCTIONS:  \"[x]\" Indicates a positive item  \"[]\" Indicates a negative item  -- DELETE ALL ITEMS NOT EXAMINED]  [] Alert  [] Oriented to person/place/time    [] No apparent distress  [] Toxic appearing    [] Face flushed appearing [] Sclera clear  [] Lips are cyanotic      [] Breathing appears normal  [] Appears tachypneic      [] Rash on visible skin    [] Cranial Nerves II-XII grossly intact    [] Motor grossly intact in visible upper extremities    [] Motor grossly intact in visible lower extremities    [] Normal Mood  [] Anxious appearing    [] Depressed appearing  [] Confused appearing      [] Poor short term memory  [] Poor long term memory    [] OTHER:      Due to this being a TeleHealth encounter, evaluation of the following organ systems is limited: Vitals/Constitutional/EENT/Resp/CV/GI//MS/Neuro/Skin/Heme-Lymph-Imm. ASSESSMENT/PLAN:     Diagnosis Orders   1.  Type 2 diabetes mellitus with other specified complication, with long-term current use of insulin (HCC)  Basic Metabolic Panel    Hemoglobin A1C     Orders Placed This Encounter   Procedures    Basic Metabolic Panel     Standing Status:   Future Standing Expiration Date:   2/8/2022    Hemoglobin A1C     Standing Status:   Future     Standing Expiration Date:   2/8/2022     Orders Placed This Encounter   Medications    BASAGLAR KWIKPEN 100 UNIT/ML injection pen     Sig: INJECT 60 UNITS SUBCUTANEOUSLY ONCE DAILY     Dispense:  15 pen     Refill:  1    metFORMIN (GLUCOPHAGE) 1000 MG tablet     Sig: TAKE 1 TABLET BY MOUTH TWICE DAILY WITH MEALS     Dispense:  180 tablet     Refill:  1     Continue current dose of Basaglar and Metformin patient to have labs done for BMP A1c follow-up in 4 weeks time A1c goal of 7 or lower    An  electronic signature was used to authenticate this note. --Symone Galdamez MD on 2/8/2021 at 1:36 PM        Pursuant to the emergency declaration under the [facility], [facility] waiver authority and the [employer] Act, this Virtual  Visit was conducted, with patient's consent, to reduce the patient's risk of exposure to COVID-19 and provide continuity of care for an established patient. Services were provided through a video synchronous discussion virtually to substitute for in-person clinic visit.

## 2021-02-23 RX ORDER — DULOXETIN HYDROCHLORIDE 60 MG/1
CAPSULE, DELAYED RELEASE ORAL
Qty: 30 CAPSULE | Refills: 0 | Status: SHIPPED | OUTPATIENT
Start: 2021-02-23 | End: 2021-03-22 | Stop reason: SDUPTHER

## 2021-03-05 DIAGNOSIS — E11.69 TYPE 2 DIABETES MELLITUS WITH OTHER SPECIFIED COMPLICATION, WITH LONG-TERM CURRENT USE OF INSULIN (HCC): ICD-10-CM

## 2021-03-05 DIAGNOSIS — Z79.4 TYPE 2 DIABETES MELLITUS WITH OTHER SPECIFIED COMPLICATION, WITH LONG-TERM CURRENT USE OF INSULIN (HCC): ICD-10-CM

## 2021-03-05 DIAGNOSIS — N18.31 STAGE 3A CHRONIC KIDNEY DISEASE (HCC): ICD-10-CM

## 2021-03-05 LAB
ALBUMIN SERPL-MCNC: 3.9 G/DL (ref 3.5–4.6)
ANION GAP SERPL CALCULATED.3IONS-SCNC: 13 MEQ/L (ref 9–15)
BUN BLDV-MCNC: 22 MG/DL (ref 6–20)
CALCIUM SERPL-MCNC: 9.5 MG/DL (ref 8.5–9.9)
CHLORIDE BLD-SCNC: 104 MEQ/L (ref 95–107)
CO2: 24 MEQ/L (ref 20–31)
CREAT SERPL-MCNC: 0.95 MG/DL (ref 0.5–0.9)
GFR AFRICAN AMERICAN: >60
GFR NON-AFRICAN AMERICAN: >60
GLUCOSE BLD-MCNC: 93 MG/DL (ref 70–99)
HBA1C MFR BLD: 8.1 % (ref 4.8–5.9)
PHOSPHORUS: 2.9 MG/DL (ref 2.3–4.8)
POTASSIUM SERPL-SCNC: 4 MEQ/L (ref 3.4–4.9)
SODIUM BLD-SCNC: 141 MEQ/L (ref 135–144)

## 2021-03-10 ENCOUNTER — OFFICE VISIT (OUTPATIENT)
Dept: ENDOCRINOLOGY | Age: 61
End: 2021-03-10
Payer: MEDICARE

## 2021-03-10 VITALS
OXYGEN SATURATION: 96 % | HEART RATE: 69 BPM | HEIGHT: 61 IN | WEIGHT: 192 LBS | SYSTOLIC BLOOD PRESSURE: 138 MMHG | BODY MASS INDEX: 36.25 KG/M2 | DIASTOLIC BLOOD PRESSURE: 83 MMHG

## 2021-03-10 DIAGNOSIS — Z79.4 TYPE 2 DIABETES MELLITUS WITH OTHER SPECIFIED COMPLICATION, WITH LONG-TERM CURRENT USE OF INSULIN (HCC): Primary | ICD-10-CM

## 2021-03-10 DIAGNOSIS — E11.69 TYPE 2 DIABETES MELLITUS WITH OTHER SPECIFIED COMPLICATION, WITH LONG-TERM CURRENT USE OF INSULIN (HCC): Primary | ICD-10-CM

## 2021-03-10 LAB
CHP ED QC CHECK: NORMAL
GLUCOSE BLD-MCNC: 146 MG/DL

## 2021-03-10 PROCEDURE — 3052F HG A1C>EQUAL 8.0%<EQUAL 9.0%: CPT | Performed by: INTERNAL MEDICINE

## 2021-03-10 PROCEDURE — 82962 GLUCOSE BLOOD TEST: CPT | Performed by: INTERNAL MEDICINE

## 2021-03-10 PROCEDURE — 99213 OFFICE O/P EST LOW 20 MIN: CPT | Performed by: INTERNAL MEDICINE

## 2021-03-10 NOTE — PROGRESS NOTES
Subjective:      Patient ID: Marianne Sterling is a 62 y.o. female. Follow-up on type 2 diabetes patient on Basaglar/Lantus 60 units at bedtime plus Metformin not taking NovoLog A1c has improved from 7.8-7.4 complications include neuropathy  Diabetes  She presents for her follow-up diabetic visit. She has type 2 diabetes mellitus. Symptoms are improving. Diabetic complications include peripheral neuropathy. Current diabetic treatment includes insulin injections (Basaglar plus Metformin). She is currently taking insulin at bedtime. Her overall blood glucose range is 180-200 mg/dl. (Lab Results       Component                Value               Date                       LABA1C                   8.1 (H)             03/05/2021              )        Results for Lazaro Cadena (MRN [id number]) as of 3/21/2021 09:46   Ref. Range 5/1/2020 12:15 10/16/2020 11:47 3/5/2021 12:56   Hemoglobin A1C Latest Ref Range: 4.8 - 5.9 % 8.6 (H) 9.2 (H) 8.1 (H)       Results for Lazaro Cadena (MRN [id number]) as of 3/10/2021 11:02   Ref.  Range 3/5/2021 12:56 3/10/2021 10:53   Sodium Latest Ref Range: 135 - 144 mEq/L 141    Potassium Latest Ref Range: 3.4 - 4.9 mEq/L 4.0    Chloride Latest Ref Range: 95 - 107 mEq/L 104    CO2 Latest Ref Range: 20 - 31 mEq/L 24    BUN Latest Ref Range: 6 - 20 mg/dL 22 (H)    Creatinine Latest Ref Range: 0.50 - 0.90 mg/dL 0.95 (H)    Anion Gap Latest Ref Range: 9 - 15 mEq/L 13    GFR Non- Latest Ref Range: >60  >60.0    GFR African American Latest Ref Range: >60  >60.0    Glucose Latest Units: mg/dL 93 146   Calcium Latest Ref Range: 8.5 - 9.9 mg/dL 9.5    Phosphorus Latest Ref Range: 2.3 - 4.8 mg/dL 2.9    Albumin Latest Ref Range: 3.5 - 4.6 g/dL 3.9    Hemoglobin A1C Latest Ref Range: 4.8 - 5.9 % 8.1 (H)      Patient Active Problem List   Diagnosis    GERD (gastroesophageal reflux disease)    Type 2 diabetes mellitus with neurologic complication, with long-term current use of insulin ([facility]    Anemia    Mood disorder ([facility]    Chronic musculoskeletal pain    Chronic midline low back pain without sciatica    Chronic neck pain    Osteopenia    Tobacco abuse    Tachycardia    Abnormal antinuclear antibody titer    Tendinopathy    Trigger finger, right ring finger    Trigger finger, left middle finger    Vitamin D deficiency    Class 2 severe obesity due to excess calories with serious comorbidity and body mass index (BMI) of 36.0 to 36.9 in adult ([facility])    Tinea pedis of right foot    Hypomagnesemia    Atrophic kidney     Allergies   Allergen Reactions    Bactrim [Sulfamethoxazole-Trimethoprim]     Oxycodone      Patient reports she \"was addicted to it in the past\".     Statins        Current Outpatient Medications:     DULoxetine (CYMBALTA) 60 MG extended release capsule, Take 1 capsule by mouth twice daily, Disp: 30 capsule, Rfl: 0    BASAGLAR KWIKPEN 100 UNIT/ML injection pen, INJECT 60 UNITS SUBCUTANEOUSLY ONCE DAILY, Disp: 15 pen, Rfl: 1    metFORMIN (GLUCOPHAGE) 1000 MG tablet, TAKE 1 TABLET BY MOUTH TWICE DAILY WITH MEALS, Disp: 180 tablet, Rfl: 1    esomeprazole (NEXIUM) 40 MG delayed release capsule, TAKE 1 CAPSULE BY MOUTH ONCE DAILY, Disp: 90 capsule, Rfl: 1    zoster recombinant adjuvanted vaccine (SHINGRIX) 50 MCG/0.5ML SUSR injection, Inject 0.5 mLs into the muscle See Admin Instructions 1 dose now and repeat in 2-6 months, Disp: 0.5 mL, Rfl: 0    Magnesium 400 MG CAPS, Take 1 capsule by mouth 2 times daily, Disp: 90 capsule, Rfl: 4    atenolol (TENORMIN) 25 MG tablet, TAKE 1 TABLET BY MOUTH ONCE DAILY, Disp: 90 tablet, Rfl: 1    vitamin D3 (CHOLECALCIFEROL) 10 MCG (400 UNIT) TABS tablet, Take 400 Units by mouth daily, Disp: , Rfl:     Turmeric 500 MG CAPS, Take by mouth 2 times daily, Disp: , Rfl:     APPLE CIDER VINEGAR PO, Take by mouth 2 times daily, Disp: , Rfl:     cyclobenzaprine (FLEXERIL) 10 MG tablet, TAKE 1 TABLET BY MOUTH NIGHTLY AS NEEDED FOR MUSCLE SPASM, Disp: , Rfl:     BD PEN NEEDLE STEFFANIE U/F 32G X 4 MM MISC, USE 1 TO INJECT INSULIN 4 TIMES DAILY, Disp: 100 each, Rfl: 12    TRUE METRIX BLOOD GLUCOSE TEST strip, USE 1 STRIP TO CHECK GLUCOSE 4 TIMES DAILY, Disp: 100 each, Rfl: 12    hydrOXYzine (VISTARIL) 25 MG capsule, hydroxyzine pamoate 25 mg capsule BID PRN anxiety, Disp: 90 capsule, Rfl: 0    acetaminophen (TYLENOL) 500 MG tablet, Take 2 tablets by mouth 3 times daily, Disp: 180 tablet, Rfl: 0    zolpidem (AMBIEN) 10 MG tablet, Take 10 mg by mouth. Take when have trouble to sleep., Disp: , Rfl:     TRUEplus Lancets 33G MISC, USE 1 TO CHECK GLUCOSE 4 TIMES DAILY, Disp: , Rfl:     Blood Glucose Monitoring Suppl (TRUE METRIX AIR GLUCOSE METER) w/Device KIT, USE TO CHECK GLUCOSE 4 TIMES DAILY, Disp: , Rfl:     pregabalin (LYRICA) 100 MG capsule, TAKE 1 CAPSULE BY MOUTH TWICE DAILY FOR 90 DAYS, Disp: 180 capsule, Rfl: 0    NOVOLOG FLEXPEN 100 UNIT/ML injection pen, INJECT 8-12  UNITS SUBCUTANEOUSLY THREE TIMES DAILY BEFORE MEAL(S), Disp: 15 pen, Rfl: 1    Review of Systems    Vitals:    03/10/21 1054   BP: 138/83   Pulse: 69   SpO2: 96%   Weight: 192 lb (87.1 kg)   Height: 5' 1\" (1.549 m)       Objective:   Physical Exam  Vitals signs reviewed. Constitutional:       Appearance: Normal appearance. She is obese. HENT:      Head: Normocephalic and atraumatic. Right Ear: External ear normal.      Left Ear: External ear normal.      Nose: Nose normal.   Eyes:      Extraocular Movements: Extraocular movements intact. Neck:      Musculoskeletal: Normal range of motion and neck supple. Cardiovascular:      Rate and Rhythm: Normal rate. Musculoskeletal: Normal range of motion. Neurological:      General: No focal deficit present. Mental Status: She is alert and oriented to person, place, and time. Assessment:       Diagnosis Orders   1.  Type 2 diabetes mellitus with other specified complication, with long-term current use of insulin (HCC)  POCT Glucose    Basic Metabolic Panel    Hemoglobin A1C           Plan:      Orders Placed This Encounter   Procedures    Basic Metabolic Panel     Standing Status:   Future     Standing Expiration Date:   3/10/2022    Hemoglobin A1C     Standing Status:   Future     Standing Expiration Date:   3/10/2022    POCT Glucose     Continue Basaglar 60 units at bedtime plus Metformin 1000 mg twice daily follow-up in 3 to 6 months time A1c goal of 7        Jose Park MD

## 2021-03-22 ENCOUNTER — VIRTUAL VISIT (OUTPATIENT)
Dept: FAMILY MEDICINE CLINIC | Age: 61
End: 2021-03-22
Payer: MEDICARE

## 2021-03-22 DIAGNOSIS — G89.29 CHRONIC MIDLINE LOW BACK PAIN WITHOUT SCIATICA: Chronic | ICD-10-CM

## 2021-03-22 DIAGNOSIS — M79.2 NEUROPATHIC PAIN OF LOWER EXTREMITY, LEFT: Chronic | ICD-10-CM

## 2021-03-22 DIAGNOSIS — F33.41 RECURRENT MAJOR DEPRESSIVE DISORDER, IN PARTIAL REMISSION (HCC): Primary | Chronic | ICD-10-CM

## 2021-03-22 DIAGNOSIS — N26.1 ATROPHIC KIDNEY: Chronic | ICD-10-CM

## 2021-03-22 DIAGNOSIS — M54.50 CHRONIC MIDLINE LOW BACK PAIN WITHOUT SCIATICA: Chronic | ICD-10-CM

## 2021-03-22 DIAGNOSIS — M79.7 FIBROMYALGIA: Chronic | ICD-10-CM

## 2021-03-22 PROCEDURE — 99215 OFFICE O/P EST HI 40 MIN: CPT | Performed by: FAMILY MEDICINE

## 2021-03-22 RX ORDER — DULOXETIN HYDROCHLORIDE 60 MG/1
CAPSULE, DELAYED RELEASE ORAL
Qty: 180 CAPSULE | Refills: 1 | Status: SHIPPED | OUTPATIENT
Start: 2021-03-22 | End: 2021-09-01 | Stop reason: SDUPTHER

## 2021-03-22 RX ORDER — PREDNISONE 20 MG/1
40 TABLET ORAL DAILY
Qty: 10 TABLET | Refills: 0 | Status: SHIPPED | OUTPATIENT
Start: 2021-03-22 | End: 2021-03-27

## 2021-03-22 NOTE — PROGRESS NOTES
Yan Sotelo is a 62 y.o. female evaluated via telephone on 3/22/2021. Consent:  She and/or health care decision maker is aware that that she may receive a bill for this telephone service, depending on her insurance coverage, and has provided verbal consent to proceed: Yes      I affirm this is a Patient Initiated Episode with an Established Patient who has not had a related appointment within my department in the past 7 days or scheduled within the next 24 hours. Total Time: 45 minutes    Note: not billable if this call serves to triage the patient into an appointment for the relevant concern      Adriana Kristopherzoraida ALARCON     3/22/2021    TELEHEALTH EVALUATION -- Audio (During WOU- public health emergency)    Chief Complaint   Patient presents with    Diabetes    Hypertension       HPI     Yan Sotelo (:  1960) has requested an audio evaluation for the following concern(s):    Patient is here for DM with f/u. Sugars have been improving and  moderately well-controlled and patient has not been experiencing hyper- or hypoglycemic symptoms. Compliance with meds is good and there are no side effects. Patient exercises occasionally and tries to eat healthy. Is up to date on foot and eye exams and immunizations. Asymmetric kidney size - has not scheduled appt with nephrology yet. Urinated Q 2 hours at night. No gross blood. Does take ibuprofen 800 mg in the morning. It is the only thing that helps her shoulders \"which swell and press on nerves and cause my hands to go numb. \"     Also, related to pain, left inner knee has pain that travels down leg to ankle. Aching during the day and when lying down or sleeping the pain is sharp and strong. Lidocaine cream seems to help. Has been an issue for 2 weeks. Has low back pain which is chronic - can't wash dishes, sweep floors or do anything. Has been to PMR in the past. Has had injections in the past. No saddle anesthesia.  No urine retention. No fecal retention or incontinence. Back does not hurt when she coughs but does hurt with bowel movements. No numbness or tingling feet. Last did PT - TENS, water therapy, trad'l PT when living in [place] and it all caused her more pain. Pain is worse with flexion, twisting, lateral flexion. Has been told she has a bulging disc. Coughing has resolved. Breathing feels completely normal.    Fibromyalgia - pain is in my entire body and it is more sensitive than it should be; is on Lyrica 100 mg BID which decreases pain about 50%. Depression - Has been seeing psychiatrist for a long time for depression. Every time she has been weaned off antidepressants she has become suicidal within two months. Still has mild mood fluctuations which feel \"normal\" and are manageable. Current mood is good. No SI/HI/SIB at this time. No hallucinations. Has been hospitalized for suicide attempts in the past - maybe 4 times not counting some of the times she has tried. Last tried to kill herself a few years ago. Review of Systems    Prior to Visit Medications    Medication Sig Taking?  Authorizing Provider   predniSONE (DELTASONE) 20 MG tablet Take 2 tablets by mouth daily for 5 days Yes Ousmane Dunaway MD   DULoxetine (CYMBALTA) 60 MG extended release capsule Take 1 capsule by mouth twice daily Yes Ousmane Dunaway MD   BASAGLAR KWIKPEN 100 UNIT/ML injection pen INJECT 60 UNITS SUBCUTANEOUSLY ONCE DAILY  Sharon Huang MD   metFORMIN (GLUCOPHAGE) 1000 MG tablet TAKE 1 TABLET BY MOUTH TWICE DAILY WITH MEALS  Sharon Huang MD   esomeprazole (NEXIUM) 40 MG delayed release capsule TAKE 1 CAPSULE BY MOUTH ONCE DAILY  Ousmane Dunaway MD   pregabalin (LYRICA) 100 MG capsule TAKE 1 CAPSULE BY MOUTH TWICE DAILY FOR 90 DAYS  Ousmane Dunaway MD   NOVOLOG FLEXPEN 100 UNIT/ML injection pen INJECT 8-12  UNITS SUBCUTANEOUSLY THREE TIMES DAILY BEFORE MEAL(S)  Sharon Huang MD   zoster recombinant adjuvanted vaccine [facility]) 50 MCG/0.5ML SUSR injection Inject 0.5 mLs into the muscle See Admin Instructions 1 dose now and repeat in 2-6 months  Glenn Bolanos MD   Magnesium 400 MG CAPS Take 1 capsule by mouth 2 times daily  Glenn Bolanos MD   atenolol (TENORMIN) 25 MG tablet TAKE 1 TABLET BY MOUTH ONCE DAILY  Glenn Bolanos MD   vitamin D3 (CHOLECALCIFEROL) 10 MCG (400 UNIT) TABS tablet Take 400 Units by mouth daily  Historical Provider, MD   Turmeric 500 MG CAPS Take by mouth 2 times daily  Historical Provider, MD   APPLE CIDER VINEGAR PO Take by mouth 2 times daily  Historical Provider, MD   cyclobenzaprine (FLEXERIL) 10 MG tablet TAKE 1 TABLET BY MOUTH NIGHTLY AS NEEDED FOR MUSCLE SPASM  Historical Provider, MD   BD PEN NEEDLE STEFFANIE U/F 32G X 4 MM MISC USE 1 TO INJECT INSULIN 4 TIMES DAILY  Glenn Bolanos MD   TRUE METRIX BLOOD GLUCOSE TEST strip USE 1 STRIP TO CHECK GLUCOSE 4 TIMES DAILY  Glenn Bolanos MD   hydrOXYzine (VISTARIL) 25 MG capsule hydroxyzine pamoate 25 mg capsule BID PRN anxiety  Glenn oBlanos MD   acetaminophen (TYLENOL) 500 MG tablet Take 2 tablets by mouth 3 times daily  Kaity Varghese PA-C   TRUEplus Lancets 33G MISC USE 1 TO CHECK GLUCOSE 4 TIMES DAILY  Historical Provider, MD   Blood Glucose Monitoring Suppl (TRUE METRIX AIR GLUCOSE METER) w/Device KIT USE TO CHECK GLUCOSE 4 TIMES DAILY  Historical Provider, MD             PHYSICAL EXAMINATION:    Patient-Reported Vitals 10/19/2020   Patient-Reported Weight 195lb   Patient-Reported Height 5' 1\"   Patient-Reported Systolic (No Data)          Patient appears to be alert and oriented to person, place, time, situation and is in no acute distress.   Respiratory effort appears normal. Mood appears stable and speech and thought are grossly normal.      Lab Results   Component Value Date    TSH 1.920 05/01/2020     Lipid Profile: No components found for: [facility] Lab Results   Component Value Date    TRIG 151 (H) 10/16/2020     Lab Results   Component Value Date    HDL 32 (L) 10/16/2020    HDL 28 (L) 05/01/2020     Lab Results   Component Value Date    LDLCALC 125 10/16/2020    [address] 05/01/2020     No results found for: LABVLDL  Hemoglobin A1C:   Lab Results   Component Value Date    LABA1C 8.1 (H) 03/05/2021         ASSESSMENT/PLAN:  Gatito Sepulveda was seen today for diabetes and hypertension. Diagnoses and all orders for this visit:    Recurrent major depressive disorder, in partial remission ([facility] [address]  Comments:  Stable and well-controlled on current meds. Continue Cymbalta. [address]   psychiatry referral. Patient stable for several years on current meds. Orders:  -     DULoxetine (CYMBALTA) 60 MG extended release capsule; Take 1 capsule by mouth twice daily    Chronic midline low back pain without sciatica  Comments:  ACutely worse with h/o \"Bulging disc\" and now with neuropathic pain. No cauda equina sx. Prednisone. Referred for xray and neurosurg eval;   Orders:  -     XR LUMBAR SPINE (MIN 4 VIEWS); Future  -     Ambulatory referral to Neurosurgery  -     predniSONE (DELTASONE) 20 MG tablet; Take 2 tablets by mouth daily for 5 days    Fibromyalgia  Comments:  COntinue Cymbalta and Lyrica. Orders:  -     DULoxetine (CYMBALTA) 60 MG extended release capsule; Take 1 capsule by mouth twice daily    Neuropathic pain of lower extremity, left  Comments:  See above  Orders:  -     XR LUMBAR SPINE (MIN 4 VIEWS); Future  -     Ambulatory referral to Neurosurgery  -     predniSONE (DELTASONE) 20 MG tablet; Take 2 tablets by mouth daily for 5 days    Atrophic kidney  Comments:  has referral to nephrology and will schedule          Return in about 4 months (around 7/22/2021) for DM, Mood Disorder - OV. Krzysztof Abdi is a 62 y.o. female being evaluated by a Virtual Visit (telephonic visit) encounter to address concerns as mentioned above.   A caregiver was present when appropriate. Due to this being a TeleHealth encounter (During LNPXQ-23 public health emergency), evaluation of the following organ systems was limited: Vitals/Constitutional/EENT/Resp/CV/GI//MS/Neuro/Skin/Heme-Lymph-Imm. Pursuant to the emergency declaration under the [address] [place] [address], [address] [place] [address] and the [employer] Act, this Virtual Visit was conducted with patient's (and/or legal guardian's) consent, to reduce the patient's risk of exposure to COVID-19 and provide necessary medical care. The patient (and/or legal guardian) has also been advised to contact this office for worsening conditions or problems, and seek emergency medical treatment and/or call 911 if deemed necessary. Services were provided through a telephonic synchronous discussion virtually to substitute for in-person clinic visit. Patient and provider were located at their individual homes. --Adi Barbosa MD on 3/22/2021 at 4:01 PM    An electronic signature was used to authenticate this note.

## 2021-03-26 ENCOUNTER — HOSPITAL ENCOUNTER (OUTPATIENT)
Dept: GENERAL RADIOLOGY | Age: 61
Discharge: HOME | End: 2021-03-28
Payer: MEDICARE

## 2021-03-26 DIAGNOSIS — G89.29 CHRONIC MIDLINE LOW BACK PAIN WITHOUT SCIATICA: Chronic | ICD-10-CM

## 2021-03-26 DIAGNOSIS — M54.50 CHRONIC MIDLINE LOW BACK PAIN WITHOUT SCIATICA: Chronic | ICD-10-CM

## 2021-03-26 DIAGNOSIS — M79.2 NEUROPATHIC PAIN OF LOWER EXTREMITY, LEFT: Chronic | ICD-10-CM

## 2021-03-26 PROCEDURE — 72110 X-RAY EXAM L-2 SPINE 4/>VWS: CPT

## 2021-03-31 DIAGNOSIS — Z79.4 TYPE 2 DIABETES MELLITUS WITH DIABETIC NEUROPATHY, WITH LONG-TERM CURRENT USE OF INSULIN (HCC): Chronic | ICD-10-CM

## 2021-03-31 DIAGNOSIS — E11.40 TYPE 2 DIABETES MELLITUS WITH DIABETIC NEUROPATHY, WITH LONG-TERM CURRENT USE OF INSULIN (HCC): Chronic | ICD-10-CM

## 2021-03-31 RX ORDER — PREGABALIN 100 MG/1
CAPSULE ORAL
Qty: 180 CAPSULE | Refills: 0 | Status: SHIPPED | OUTPATIENT
Start: 2021-03-31 | End: 2021-06-29 | Stop reason: SDUPTHER

## 2021-05-10 RX ORDER — INSULIN GLARGINE 100 [IU]/ML
INJECTION, SOLUTION SUBCUTANEOUS
Qty: 15 PEN | Refills: 1 | Status: SHIPPED | OUTPATIENT
Start: 2021-05-10 | End: 2021-09-01 | Stop reason: SDUPTHER

## 2021-05-13 RX ORDER — ATENOLOL 25 MG/1
TABLET ORAL
Qty: 90 TABLET | Refills: 1 | Status: SHIPPED | OUTPATIENT
Start: 2021-05-13 | End: 2021-09-01 | Stop reason: SDUPTHER

## 2021-05-13 NOTE — TELEPHONE ENCOUNTER
Patient requesting medication refill.  Please approve or deny this request.    Rx requested:  Requested Prescriptions     Pending Prescriptions Disp Refills    atenolol (TENORMIN) 25 MG tablet 90 tablet 1     Sig: TAKE 1 TABLET BY MOUTH ONCE DAILY         Last Office Visit:   3/22/2021      Next Visit Date:  Future Appointments   Date Time Provider Taylor Tejada   6/9/2021  1:30 PM Mely Gibbs  [address]   6/30/2021  2:15 PM Kevin Foster MD [facility]   7/20/2021  2:30 PM Candy Blackburn MD [address] [place] [address]

## 2021-06-29 DIAGNOSIS — E11.40 TYPE 2 DIABETES MELLITUS WITH DIABETIC NEUROPATHY, WITH LONG-TERM CURRENT USE OF INSULIN (HCC): Chronic | ICD-10-CM

## 2021-06-29 DIAGNOSIS — Z79.4 TYPE 2 DIABETES MELLITUS WITH DIABETIC NEUROPATHY, WITH LONG-TERM CURRENT USE OF INSULIN (HCC): Chronic | ICD-10-CM

## 2021-06-30 DIAGNOSIS — E11.40 TYPE 2 DIABETES MELLITUS WITH DIABETIC NEUROPATHY, WITH LONG-TERM CURRENT USE OF INSULIN (HCC): Chronic | ICD-10-CM

## 2021-06-30 DIAGNOSIS — M79.644 THUMB PAIN, RIGHT: Primary | ICD-10-CM

## 2021-06-30 DIAGNOSIS — Z79.4 TYPE 2 DIABETES MELLITUS WITH DIABETIC NEUROPATHY, WITH LONG-TERM CURRENT USE OF INSULIN (HCC): Chronic | ICD-10-CM

## 2021-06-30 RX ORDER — PREGABALIN 100 MG/1
CAPSULE ORAL
Qty: 180 CAPSULE | Refills: 0 | Status: CANCELLED | OUTPATIENT
Start: 2021-06-30 | End: 2021-09-23

## 2021-06-30 RX ORDER — PREGABALIN 100 MG/1
CAPSULE ORAL
Qty: 180 CAPSULE | Refills: 0 | Status: SHIPPED | OUTPATIENT
Start: 2021-06-30 | End: 2021-09-28 | Stop reason: SDUPTHER

## 2021-06-30 NOTE — TELEPHONE ENCOUNTER
Pharmacy requesting medication refill.  Please approve or deny this request.    Rx requested:  Requested Prescriptions     Pending Prescriptions Disp Refills    pregabalin (LYRICA) 100 MG capsule 180 capsule 0     Sig: TAKE 1 CAPSULE BY MOUTH TWICE DAILY FOR 90 DAYS         Last Office Visit:   3/22/2021      Next Visit Date:  Future Appointments   Date Time Provider Taylor Tejada   7/6/2021 10:30 AM [facility]   7/7/2021  2:45 PM Anant Jacqualin Osgood, MD [address] [place] [address]   7/20/2021  2:30 PM Lisette Warner MD [address] [place] [address]

## 2021-06-30 NOTE — TELEPHONE ENCOUNTER
Pharmacy requesting medication refill.  Please approve or deny this request.    Rx requested:  Requested Prescriptions     Pending Prescriptions Disp Refills    pregabalin (LYRICA) 100 MG capsule 180 capsule 0     Sig: TAKE 1 CAPSULE BY MOUTH TWICE DAILY FOR 90 DAYS         Last Office Visit:   3/22/2021      Next Visit Date:  Future Appointments   Date Time Provider Taylor Tejada   7/6/2021 10:30 AM OG Tamez - CNP [facility]   7/7/2021  2:45 PM Nigel Araya MD [address] [place] [address]   7/20/2021  2:30 PM Jimena Luo MD [address] [place] [address]

## 2021-07-05 NOTE — TELEPHONE ENCOUNTER
Rx request   Requested Prescriptions     Pending Prescriptions Disp Refills    esomeprazole (NEXIUM) 40 MG delayed release capsule 90 capsule 1     Sig: TAKE 1 CAPSULE BY MOUTH ONCE DAILY     LOV 3/22/2021  Next Visit Date:  Future Appointments   Date Time Provider Taylor Tejada   7/6/2021 10:30 AM Vernelle Closs, APRN - CNP [facility]   7/7/2021  2:45 PM Nigel Dickinson MD [address] [place] [address]   7/20/2021  2:30 PM Christi Broussard MD [address] [place] [address]

## 2021-07-06 RX ORDER — ESOMEPRAZOLE MAGNESIUM 40 MG/1
CAPSULE, DELAYED RELEASE ORAL
Qty: 90 CAPSULE | Refills: 1 | Status: SHIPPED | OUTPATIENT
Start: 2021-07-06 | End: 2021-09-28 | Stop reason: SDUPTHER

## 2021-07-07 ENCOUNTER — HOSPITAL ENCOUNTER (OUTPATIENT)
Dept: GENERAL RADIOLOGY | Age: 61
Discharge: HOME | End: 2021-07-09
Payer: MEDICARE

## 2021-07-07 ENCOUNTER — OFFICE VISIT (OUTPATIENT)
Dept: ENDOCRINOLOGY | Age: 61
End: 2021-07-07
Payer: MEDICARE

## 2021-07-07 VITALS
DIASTOLIC BLOOD PRESSURE: 78 MMHG | WEIGHT: 196 LBS | SYSTOLIC BLOOD PRESSURE: 119 MMHG | BODY MASS INDEX: 37 KG/M2 | HEART RATE: 79 BPM | HEIGHT: 61 IN | OXYGEN SATURATION: 96 %

## 2021-07-07 DIAGNOSIS — Z79.4 TYPE 2 DIABETES MELLITUS WITH OTHER SPECIFIED COMPLICATION, WITH LONG-TERM CURRENT USE OF INSULIN (HCC): Primary | ICD-10-CM

## 2021-07-07 DIAGNOSIS — M54.16 LUMBAR RADICULOPATHY: ICD-10-CM

## 2021-07-07 DIAGNOSIS — M54.2 NECK PAIN: ICD-10-CM

## 2021-07-07 DIAGNOSIS — M54.9 BACK PAIN, UNSPECIFIED BACK LOCATION, UNSPECIFIED BACK PAIN LATERALITY, UNSPECIFIED CHRONICITY: ICD-10-CM

## 2021-07-07 DIAGNOSIS — M79.644 THUMB PAIN, RIGHT: ICD-10-CM

## 2021-07-07 DIAGNOSIS — E11.69 TYPE 2 DIABETES MELLITUS WITH OTHER SPECIFIED COMPLICATION, WITH LONG-TERM CURRENT USE OF INSULIN (HCC): Primary | ICD-10-CM

## 2021-07-07 LAB
CHP ED QC CHECK: NORMAL
GLUCOSE BLD-MCNC: 231 MG/DL
HBA1C MFR BLD: 8.7 %

## 2021-07-07 PROCEDURE — 73140 X-RAY EXAM OF FINGER(S): CPT

## 2021-07-07 PROCEDURE — 83036 HEMOGLOBIN GLYCOSYLATED A1C: CPT | Performed by: INTERNAL MEDICINE

## 2021-07-07 PROCEDURE — 72050 X-RAY EXAM NECK SPINE 4/5VWS: CPT

## 2021-07-07 PROCEDURE — 99213 OFFICE O/P EST LOW 20 MIN: CPT | Performed by: INTERNAL MEDICINE

## 2021-07-07 PROCEDURE — 3052F HG A1C>EQUAL 8.0%<EQUAL 9.0%: CPT | Performed by: INTERNAL MEDICINE

## 2021-07-07 PROCEDURE — 82962 GLUCOSE BLOOD TEST: CPT | Performed by: INTERNAL MEDICINE

## 2021-07-07 RX ORDER — INSULIN ASPART 100 [IU]/ML
INJECTION, SOLUTION INTRAVENOUS; SUBCUTANEOUS
Qty: 15 PEN | Refills: 1
Start: 2021-07-07 | End: 2021-12-13

## 2021-07-07 NOTE — PROGRESS NOTES
7/7/2021    Assessment:       Diagnosis Orders   1. Type 2 diabetes mellitus with other specified complication, with long-term current use of insulin ([facility])  POCT Glucose    POCT glycosylated hemoglobin (Hb A1C)    Basic Metabolic Panel, Fasting    Hemoglobin A1C         PLAN:     Continue Basaglar 60 units at bedtime plus NovoLog 8 to 12 days with each meals continue Metformin 1000 mg twice a day A1c goal of 7 or lower  Orders Placed This Encounter   Medications    NOVOLOG FLEXPEN 100 UNIT/ML injection pen     Sig: INJECT 8-12  UNITS SUBCUTANEOUSLY THREE TIMES DAILY BEFORE MEAL(S)     Dispense:  15 pen     Refill:  1           Orders Placed This Encounter   Procedures    POCT Glucose    POCT glycosylated hemoglobin (Hb A1C)       Subjective:     Chief Complaint   Patient presents with    Diabetes     Vitals:    07/07/21 1456   BP: 119/78   Pulse: 79   SpO2: 96%   Weight: 196 lb (88.9 kg)   Height: 5' 1\" (1.549 m)     Wt Readings from Last 3 Encounters:   07/07/21 196 lb (88.9 kg)   04/21/21 198 lb (89.8 kg)   03/10/21 192 lb (87.1 kg)     BP Readings from Last 3 Encounters:   07/07/21 119/78   03/10/21 138/83   12/02/20 138/83     Follow-up on type 2 diabetes patient on Basaglar plus NovoLog A1c is gone up from 6.0-0.8 complications include chronic kidney disease    Diabetes  She presents for her follow-up diabetic visit. She has type 2 diabetes mellitus. Symptoms are worsening. Diabetic complications include nephropathy. Current diabetic treatment includes insulin injections. She is currently taking insulin pre-breakfast, pre-lunch, pre-dinner and at bedtime. Her overall blood glucose range is >200 mg/dl.  (Lab Results       Component                Value               Date                       LABA1C                   8.7                 07/07/2021            )     Past Medical History:   Diagnosis Date    Arthritis     Arthritis     Atrophic kidney 12/23/2020    Bulging lumbar disc     lower back    Chronic back pain     Chronic headaches     Chronic kidney disease     Depression     Diabetes mellitus ([facility] [address]     Fibromyalgia     Fibromyalgia     Hypomagnesemia 2020    Recurrent major depressive disorder, in partial remission ([facility] [address] 3/5/2020     Past Surgical History:   Procedure Laterality Date     SECTION      [id number],    FINGER TRIGGER RELEASE Left 2020    TRIGGER RELEASE MIDDLE FINGER AND RING FINGER LEFT HAND performed by Magda Corrales MD at [address] [place] [address] Right 11/3/2020    RELEASE OF RIGHT RING FINGER TRIGGER FINGER performed by Magda Corrales MD at [facility]    kidney stone removal      Social History     Socioeconomic History    Marital status:      Spouse name: Not on file    Number of children: Not on file    Years of education: Not on file    Highest education level: Not on file   Occupational History    Not on file   Tobacco Use    Smoking status: Current Every Day Smoker     Packs/day: 1.50     Years: 44.00     Pack years: 66.00     Types: Cigarettes     Start date: 1976    Smokeless tobacco: Never Used   Vaping Use    Vaping Use: Never used   Substance and Sexual Activity    Alcohol use: Yes     Comment: occasionally    Drug use: Never    Sexual activity: Not on file   Other Topics Concern    Not on file   Social History Narrative    Not on file     Social Determinants of Health     Financial Resource Strain:     Difficulty of Paying Living Expenses:    Food Insecurity:     Worried About Running Out of Food in the Last Year:     Ran Out of Food in the Last Year:    Transportation Needs:     Lack of Transportation (Medical):      Lack of Transportation (Non-Medical):    Physical Activity:     Days of Exercise per Week:     Minutes of Exercise per Session:    Stress:     Feeling of Stress :    Social Connections:     Frequency of Communication with Friends and Family:  Frequency of Social Gatherings with Friends and Family:     Attends Jew Services:     Active Member of Clubs or Organizations:     Attends Club or Organization Meetings:     Marital Status:    Intimate Partner Violence:     Fear of Current or Ex-Partner:     Emotionally Abused:     Physically Abused:     Sexually Abused:      Family History   Problem Relation Age of Onset    Diabetes Mother     Arthritis Mother     Diabetes Father     Heart Attack Father     Arthritis Father     Heart Disease Father     Coronary Art Dis Father     Stroke Father      Allergies   Allergen Reactions    Bactrim [Sulfamethoxazole-Trimethoprim]     Oxycodone      Patient reports she \"was addicted to it in the past\".     Statins     Sulfa Antibiotics        Current Outpatient Medications:     esomeprazole (NEXIUM) 40 MG delayed release capsule, TAKE 1 CAPSULE BY MOUTH ONCE DAILY, Disp: 90 capsule, Rfl: 1    pregabalin (LYRICA) 100 MG capsule, TAKE 1 CAPSULE BY MOUTH TWICE DAILY FOR 90 DAYS, Disp: 180 capsule, Rfl: 0    atenolol (TENORMIN) 25 MG tablet, TAKE 1 TABLET BY MOUTH ONCE DAILY, Disp: 90 tablet, Rfl: 1    BASAGLAR KWIKPEN 100 UNIT/ML injection pen, INJECT 60 UNITS SUBCUTANEOUSLY ONCE DAILY, Disp: 15 pen, Rfl: 1    DULoxetine (CYMBALTA) 60 MG extended release capsule, Take 1 capsule by mouth twice daily, Disp: 180 capsule, Rfl: 1    metFORMIN (GLUCOPHAGE) 1000 MG tablet, TAKE 1 TABLET BY MOUTH TWICE DAILY WITH MEALS, Disp: 180 tablet, Rfl: 1    Magnesium 400 MG CAPS, Take 1 capsule by mouth 2 times daily, Disp: 90 capsule, Rfl: 4    Turmeric 500 MG CAPS, Take by mouth 2 times daily, Disp: , Rfl:     APPLE CIDER VINEGAR PO, Take by mouth 2 times daily, Disp: , Rfl:     cyclobenzaprine (FLEXERIL) 10 MG tablet, TAKE 1 TABLET BY MOUTH NIGHTLY AS NEEDED FOR MUSCLE SPASM, Disp: , Rfl:     BD PEN NEEDLE STEFFANIE U/F 32G X 4 MM MISC, USE 1 TO INJECT INSULIN 4 TIMES DAILY, Disp: 100 each, Rfl: 12    TRUE METRIX BLOOD GLUCOSE TEST strip, USE 1 STRIP TO CHECK GLUCOSE 4 TIMES DAILY, Disp: 100 each, Rfl: 12    hydrOXYzine (VISTARIL) 25 MG capsule, hydroxyzine pamoate 25 mg capsule BID PRN anxiety, Disp: 90 capsule, Rfl: 0    acetaminophen (TYLENOL) 500 MG tablet, Take 2 tablets by mouth 3 times daily, Disp: 180 tablet, Rfl: 0    TRUEplus Lancets 33G MISC, USE 1 TO CHECK GLUCOSE 4 TIMES DAILY, Disp: , Rfl:     Blood Glucose Monitoring Suppl (TRUE METRIX AIR GLUCOSE METER) w/Device KIT, USE TO CHECK GLUCOSE 4 TIMES DAILY, Disp: , Rfl:   Lab Results   Component Value Date     (L) 04/19/2021    K 4.4 04/19/2021    CL 98 04/19/2021    CO2 21 04/19/2021    BUN 23 (H) 04/19/2021    CREATININE 1.26 (H) 04/19/2021    GLUCOSE 231 07/07/2021    CALCIUM 8.8 04/19/2021    PROT 7.7 05/01/2020    LABALBU 3.8 04/19/2021    BILITOT <0.2 05/01/2020    ALKPHOS 104 05/01/2020    AST 26 05/01/2020    ALT 35 (H) 05/01/2020    LABGLOM 43.3 (L) 04/19/2021    GFRAA 52.4 (L) 04/19/2021    GLOB 3.7 (H) 05/01/2020     Lab Results   Component Value Date    WBC 8.9 04/19/2021    HGB 13.1 04/19/2021    HCT 39.5 04/19/2021    MCV 78.1 (L) 04/19/2021     04/19/2021     Lab Results   Component Value Date    LABA1C 8.7 07/07/2021    LABA1C 8.1 (H) 03/05/2021    LABA1C 9.2 (H) 10/16/2020     Lab Results   Component Value Date    CHOLFAST 168 05/01/2020    TRIGLYCFAST 261 (H) 05/01/2020    HDL 32 (L) 10/16/2020    HDL 28 (L) 05/01/2020    LDLCALC 125 10/16/2020    LDLCALC 88 05/01/2020    CHOL 187 10/16/2020    TRIG 151 (H) 10/16/2020       Review of Systems   Cardiovascular: Negative. Endocrine: Negative. All other systems reviewed and are negative. Objective:   Physical Exam  Vitals reviewed. Constitutional:       Appearance: Normal appearance. She is obese. HENT:      Head: Normocephalic and atraumatic.  Hair is normal.      Right Ear: External ear normal.      Left Ear: External ear normal.      Nose: Nose normal.   Eyes: General: No scleral icterus. Right eye: No discharge. Left eye: No discharge. Extraocular Movements: Extraocular movements intact. Conjunctiva/sclera: Conjunctivae normal.   Neck:      Trachea: Trachea normal.   Cardiovascular:      Rate and Rhythm: Normal rate. Pulmonary:      Effort: Pulmonary effort is normal.   Musculoskeletal:         General: Normal range of motion. Cervical back: Normal range of motion and neck supple. Neurological:      General: No focal deficit present. Mental Status: She is alert and oriented to person, place, and time.    Psychiatric:         Mood and Affect: Mood normal.         Behavior: Behavior normal.

## 2021-07-13 DIAGNOSIS — E83.42 HYPOMAGNESEMIA: Primary | ICD-10-CM

## 2021-07-13 DIAGNOSIS — F50.89 PICA IN ADULTS: ICD-10-CM

## 2021-07-20 ENCOUNTER — OFFICE VISIT (OUTPATIENT)
Dept: FAMILY MEDICINE CLINIC | Age: 61
End: 2021-07-20
Payer: MEDICARE

## 2021-07-20 VITALS
BODY MASS INDEX: 36.51 KG/M2 | RESPIRATION RATE: 17 BRPM | HEART RATE: 83 BPM | WEIGHT: 193.4 LBS | OXYGEN SATURATION: 96 % | DIASTOLIC BLOOD PRESSURE: 70 MMHG | SYSTOLIC BLOOD PRESSURE: 110 MMHG | HEIGHT: 61 IN | TEMPERATURE: 95.8 F

## 2021-07-20 DIAGNOSIS — R10.11 RUQ PAIN: ICD-10-CM

## 2021-07-20 DIAGNOSIS — F33.41 RECURRENT MAJOR DEPRESSIVE DISORDER, IN PARTIAL REMISSION (HCC): Chronic | ICD-10-CM

## 2021-07-20 DIAGNOSIS — N26.1 ATROPHIC KIDNEY: Chronic | ICD-10-CM

## 2021-07-20 DIAGNOSIS — E11.40 TYPE 2 DIABETES MELLITUS WITH DIABETIC NEUROPATHY, WITH LONG-TERM CURRENT USE OF INSULIN (HCC): Primary | Chronic | ICD-10-CM

## 2021-07-20 DIAGNOSIS — R93.7 ABNORMAL FINDINGS ON DIAGNOSTIC IMAGING OF OTHER PARTS OF MUSCULOSKELETAL SYSTEM: ICD-10-CM

## 2021-07-20 DIAGNOSIS — M80.00XD OSTEOPOROSIS WITH CURRENT PATHOLOGICAL FRACTURE WITH ROUTINE HEALING, UNSPECIFIED OSTEOPOROSIS TYPE, SUBSEQUENT ENCOUNTER: Chronic | ICD-10-CM

## 2021-07-20 DIAGNOSIS — K21.9 GASTROESOPHAGEAL REFLUX DISEASE, UNSPECIFIED WHETHER ESOPHAGITIS PRESENT: Chronic | ICD-10-CM

## 2021-07-20 DIAGNOSIS — Z12.31 ENCOUNTER FOR SCREENING MAMMOGRAM FOR BREAST CANCER: ICD-10-CM

## 2021-07-20 DIAGNOSIS — Z79.4 TYPE 2 DIABETES MELLITUS WITH DIABETIC NEUROPATHY, WITH LONG-TERM CURRENT USE OF INSULIN (HCC): Primary | Chronic | ICD-10-CM

## 2021-07-20 DIAGNOSIS — E66.01 CLASS 2 SEVERE OBESITY DUE TO EXCESS CALORIES WITH SERIOUS COMORBIDITY AND BODY MASS INDEX (BMI) OF 36.0 TO 36.9 IN ADULT (HCC): Chronic | ICD-10-CM

## 2021-07-20 PROCEDURE — 99214 OFFICE O/P EST MOD 30 MIN: CPT | Performed by: FAMILY MEDICINE

## 2021-07-20 PROCEDURE — 3052F HG A1C>EQUAL 8.0%<EQUAL 9.0%: CPT | Performed by: FAMILY MEDICINE

## 2021-07-20 SDOH — ECONOMIC STABILITY: TRANSPORTATION INSECURITY
IN THE PAST 12 MONTHS, HAS THE LACK OF TRANSPORTATION KEPT YOU FROM MEDICAL APPOINTMENTS OR FROM GETTING MEDICATIONS?: NO

## 2021-07-20 SDOH — ECONOMIC STABILITY: FOOD INSECURITY: WITHIN THE PAST 12 MONTHS, YOU WORRIED THAT YOUR FOOD WOULD RUN OUT BEFORE YOU GOT MONEY TO BUY MORE.: NEVER TRUE

## 2021-07-20 SDOH — ECONOMIC STABILITY: FOOD INSECURITY: WITHIN THE PAST 12 MONTHS, THE FOOD YOU BOUGHT JUST DIDN'T LAST AND YOU DIDN'T HAVE MONEY TO GET MORE.: NEVER TRUE

## 2021-07-20 SDOH — ECONOMIC STABILITY: TRANSPORTATION INSECURITY
IN THE PAST 12 MONTHS, HAS LACK OF TRANSPORTATION KEPT YOU FROM MEETINGS, WORK, OR FROM GETTING THINGS NEEDED FOR DAILY LIVING?: NO

## 2021-07-20 ASSESSMENT — SOCIAL DETERMINANTS OF HEALTH (SDOH): HOW HARD IS IT FOR YOU TO PAY FOR THE VERY BASICS LIKE FOOD, HOUSING, MEDICAL CARE, AND HEATING?: NOT VERY HARD

## 2021-07-20 NOTE — PROGRESS NOTES
Subjective  Rosa Avila, 62 y.o. female presents today with:  Chief Complaint   Patient presents with    Diabetes    Other     talk about an ulcer she has and about her gallbladder            HPI    Has been getting intermittent RUQ pain for a while and had serious pain a few days ago. Not related to eating. Some nausea. No emesis. ressure on area make it worse. Last episode lasted about three days, intensified over time. No bloody or black tarry stools. No diarrhea. No constipation. Is concerned about gall bladder. Patient is here for DM f/u. Sugars have been poorly controlled and patient has not been experiencing hyper- or hypoglycemic symptoms. Compliance with meds is poor - forgets to take Novolog - and there are no side effects. Patient exercises rarely and tries to eat healthy. GERD - indigestion and heartburn and epigastric pain. Started on nexium with good results. Sx worse with meals and water. Not worse with Red Bull. TUMS not effective. Pepcid AC helped some. Patient is being treated for depression and has been compliant with meds which do not cause side effects. Mood is improved. No suicidal ideation.  Sleep is normal.    No other questions and or concerns for today's visit          Past Medical History:   Diagnosis Date    Arthritis     Arthritis     Atrophic kidney 2020    Bulging lumbar disc     lower back    Chronic back pain     Chronic headaches     Chronic kidney disease     Depression     Diabetes mellitus ([address]     Fibromyalgia     Fibromyalgia     Hypomagnesemia 2020    Osteoporosis with current pathological fracture with routine healing 2021    Recurrent major depressive disorder, in partial remission ([address] 3/5/2020     Past Surgical History:   Procedure Laterality Date     SECTION      [id number],    FINGER TRIGGER RELEASE Left 2020    TRIGGER RELEASE MIDDLE FINGER AND RING FINGER LEFT HAND performed by [facility] Titus Foy MD at [address] [facility] Right 11/3/2020    RELEASE OF RIGHT RING FINGER TRIGGER FINGER performed by Armen Johnson MD at [facility]    kidney stone removal      Social History     Socioeconomic History    Marital status:      Spouse name: Not on file    Number of children: Not on file    Years of education: Not on file    Highest education level: Not on file   Occupational History    Not on file   Tobacco Use    Smoking status: Current Every Day Smoker     Packs/day: 1.50     Years: 44.00     Pack years: 66.00     Types: Cigarettes     Start date: 5/1/1976    Smokeless tobacco: Never Used   Vaping Use    Vaping Use: Never used   Substance and Sexual Activity    Alcohol use: Yes     Comment: occasionally    Drug use: Never    Sexual activity: Not on file   Other Topics Concern    Not on file   Social History Narrative    Not on file     Social Determinants of Health     Financial Resource Strain: Low Risk     Difficulty of Paying Living Expenses: Not very hard   Food Insecurity: No Food Insecurity    Worried About Running Out of Food in the Last Year: Never true    Hortencia of Food in the Last Year: Never true   Transportation Needs: No Transportation Needs    Lack of Transportation (Medical): No    Lack of Transportation (Non-Medical):  No   Physical Activity:     Days of Exercise per Week:     Minutes of Exercise per Session:    Stress:     Feeling of Stress :    Social Connections:     Frequency of Communication with Friends and Family:     Frequency of Social Gatherings with Friends and Family:     Attends Congregation Services:     Active Member of Clubs or Organizations:     Attends Club or Organization Meetings:     Marital Status:    Intimate Partner Violence:     Fear of Current or Ex-Partner:     Emotionally Abused:     Physically Abused:     Sexually Abused:      Family History   Problem Relation Age of Onset  Diabetes Mother     Arthritis Mother     Diabetes Father     Heart Attack Father     Arthritis Father     Heart Disease Father     Coronary Art Dis Father     Stroke Father      Allergies   Allergen Reactions    Bactrim [Sulfamethoxazole-Trimethoprim]     Oxycodone      Patient reports she \"was addicted to it in the past\".     Statins     Sulfa Antibiotics      Current Outpatient Medications   Medication Sig Dispense Refill    Iron, Ferrous Sulfate, 325 (65 Fe) MG TABS Take by mouth      esomeprazole (NEXIUM) 40 MG delayed release capsule TAKE 1 CAPSULE BY MOUTH ONCE DAILY 90 capsule 1    pregabalin (LYRICA) 100 MG capsule TAKE 1 CAPSULE BY MOUTH TWICE DAILY FOR 90 DAYS 180 capsule 0    atenolol (TENORMIN) 25 MG tablet TAKE 1 TABLET BY MOUTH ONCE DAILY 90 tablet 1    BASAGLAR KWIKPEN 100 UNIT/ML injection pen INJECT 60 UNITS SUBCUTANEOUSLY ONCE DAILY 15 pen 1    DULoxetine (CYMBALTA) 60 MG extended release capsule Take 1 capsule by mouth twice daily 180 capsule 1    metFORMIN (GLUCOPHAGE) 1000 MG tablet TAKE 1 TABLET BY MOUTH TWICE DAILY WITH MEALS 180 tablet 1    Magnesium 400 MG CAPS Take 1 capsule by mouth 2 times daily 90 capsule 4    Turmeric 500 MG CAPS Take by mouth 2 times daily      cyclobenzaprine (FLEXERIL) 10 MG tablet TAKE 1 TABLET BY MOUTH NIGHTLY AS NEEDED FOR MUSCLE SPASM      BD PEN NEEDLE STEFFANIE U/F 32G X 4 MM MISC USE 1 TO INJECT INSULIN 4 TIMES DAILY 100 each 12    TRUE METRIX BLOOD GLUCOSE TEST strip USE 1 STRIP TO CHECK GLUCOSE 4 TIMES DAILY 100 each 12    hydrOXYzine (VISTARIL) 25 MG capsule hydroxyzine pamoate 25 mg capsule BID PRN anxiety 90 capsule 0    acetaminophen (TYLENOL) 500 MG tablet Take 2 tablets by mouth 3 times daily 180 tablet 0    TRUEplus Lancets 33G MISC USE 1 TO CHECK GLUCOSE 4 TIMES DAILY      Blood Glucose Monitoring Suppl (TRUE METRIX AIR GLUCOSE METER) w/Device KIT USE TO CHECK GLUCOSE 4 TIMES DAILY      NOVOLOG FLEXPEN 100 UNIT/ML injection at play. Addressed lifestyle changes. Reassess in 3 months. Gastroesophageal reflux disease, unspecified whether esophagitis present        Stable and well-controlled on current meds         Atrophic kidney        Follow labs. Avoid nephrotoxic agents         Class 2 severe obesity due to excess calories with serious comorbidity and body mass index (BMI) of 36.0 to 36.9 in adult (HCC)        Stable, fair control urged med adherence, healthy, well portioned diet and exercise         Recurrent major depressive disorder, in partial remission ([facility])        Stable, well controlled. RUQ pain        US ABDOMEN LIMITED [[id number] Custom]   - Future Order         Osteoporosis with current pathological fracture with routine healing, unspecified osteoporosis type, subsequent encounter        DEXA BONE DENSITY AXIAL SKELETON [[id number] Custom]   - Future Order         Abnormal findings on diagnostic imaging of other parts of musculoskeletal system        DEXA BONE DENSITY AXIAL SKELETON [[id number] Custom]   - Future Order         Encounter for screening mammogram for breast cancer        SHAWNA DIGITAL SCREEN W OR WO CAD BILATERAL [[id number] Custom]   - Future Order         ORDERS WITHOUT AN ASSOCIATED DIAGNOSIS    Iron, Ferrous Sulfate, 325 (65 Fe) MG TABS [[id number]]              Reviewed with the patient: all disease processes, current clinical status, medications, activities and diet.      Side effects, adverse effects of the medication prescribed today, as well as treatment plan/ rationale and result expectations have been discussed with the patient who expresses understanding and desires to proceed.     Close follow up to evaluate treatment results and for coordination of care. I have reviewed the patient's medical history in detail and updated the computerized patient record. More than 50% of the appointment was spent in face-to-face counseling, education and care coordination.     Please note this report has been partially produced using speech recognition software and may contain mistakes related to that system including errors in grammar, punctuation and spelling as well as words and phrases that may seem inappropriate. If there are questions or concerns, please feel free to contact me to clarify. Orders Placed This Encounter   Procedures    SHAWNA DIGITAL SCREEN W OR WO CAD BILATERAL     Standing Status:   Future     Standing Expiration Date:   9/20/2022     Order Specific Question:   Reason for exam:     Answer:   screen    US ABDOMEN LIMITED     Standing Status:   Future     Standing Expiration Date:   7/20/2022     Order Specific Question:   Reason for exam:     Answer:   RUQ pain     Order Specific Question:   Specify organ? Answer:   GALLBLADDER     Order Specific Question:   Specify organ? Answer:   LIVER    DEXA BONE DENSITY AXIAL SKELETON     Standing Status:   Future     Standing Expiration Date:   7/20/2022     Order Specific Question:   Reason for exam:     Answer:   survey for osteoporosis in setting of h/o osteopenia     No orders of the defined types were placed in this encounter. There are no discontinued medications. Return in about 4 weeks (around 8/17/2021) for DM, CKD, HTN, Obesity, GERD - VV or OV. Controlled Substance Monitoring:    Acute and Chronic Pain Monitoring:   No flowsheet data found.         Shasha Kahn MD

## 2021-08-18 ENCOUNTER — APPOINTMENT (OUTPATIENT)
Dept: WOMENS IMAGING | Age: 61
End: 2021-08-18
Payer: MEDICARE

## 2021-08-18 ENCOUNTER — HOSPITAL ENCOUNTER (OUTPATIENT)
Dept: ULTRASOUND IMAGING | Age: 61
Discharge: HOME | End: 2021-08-20
Payer: MEDICARE

## 2021-08-18 DIAGNOSIS — R10.11 RUQ PAIN: ICD-10-CM

## 2021-08-18 PROCEDURE — 76705 ECHO EXAM OF ABDOMEN: CPT

## 2021-08-24 ENCOUNTER — HOSPITAL ENCOUNTER (EMERGENCY)
Age: 61
Discharge: HOME | End: 2021-08-24
Payer: MEDICARE

## 2021-08-24 ENCOUNTER — APPOINTMENT (OUTPATIENT)
Dept: GENERAL RADIOLOGY | Age: 61
End: 2021-08-24
Payer: MEDICARE

## 2021-08-24 VITALS
RESPIRATION RATE: 18 BRPM | TEMPERATURE: 98.4 F | OXYGEN SATURATION: 96 % | DIASTOLIC BLOOD PRESSURE: 73 MMHG | SYSTOLIC BLOOD PRESSURE: 128 MMHG | WEIGHT: 198 LBS | BODY MASS INDEX: 37.38 KG/M2 | HEIGHT: 61 IN | HEART RATE: 101 BPM

## 2021-08-24 DIAGNOSIS — S46.912A STRAIN OF LEFT SHOULDER, INITIAL ENCOUNTER: Primary | ICD-10-CM

## 2021-08-24 PROCEDURE — 6370000000 HC RX 637 (ALT 250 FOR IP): Performed by: PHYSICIAN ASSISTANT

## 2021-08-24 PROCEDURE — 73030 X-RAY EXAM OF SHOULDER: CPT

## 2021-08-24 PROCEDURE — 99284 EMERGENCY DEPT VISIT MOD MDM: CPT

## 2021-08-24 RX ORDER — OXYCODONE HYDROCHLORIDE AND ACETAMINOPHEN 5; 325 MG/1; MG/1
1 TABLET ORAL ONCE
Status: COMPLETED | OUTPATIENT
Start: 2021-08-24 | End: 2021-08-24

## 2021-08-24 RX ADMIN — OXYCODONE AND ACETAMINOPHEN 1 TABLET: 5; 325 TABLET ORAL at 15:56

## 2021-08-24 ASSESSMENT — PAIN SCALES - GENERAL: PAINLEVEL_OUTOF10: 7

## 2021-08-24 ASSESSMENT — ENCOUNTER SYMPTOMS
CHEST TIGHTNESS: 0
COUGH: 0
BACK PAIN: 0
SHORTNESS OF BREATH: 0

## 2021-08-24 ASSESSMENT — PAIN DESCRIPTION - PAIN TYPE: TYPE: ACUTE PAIN

## 2021-08-24 ASSESSMENT — PAIN DESCRIPTION - LOCATION: LOCATION: SHOULDER

## 2021-08-24 ASSESSMENT — PAIN DESCRIPTION - DESCRIPTORS: DESCRIPTORS: ACHING

## 2021-08-24 ASSESSMENT — PAIN DESCRIPTION - ORIENTATION: ORIENTATION: LEFT

## 2021-08-24 NOTE — ED PROVIDER NOTES
[facility] ED  eMERGENCYdEPARTMENT eNCOUnter      Pt Name: Otto Phoenix  MRN: [id number]  Leatha 1960of evaluation: 8/24/2021  Slim Jurado PA-C    CHIEF COMPLAINT       Chief Complaint   Patient presents with    Shoulder Pain     Left          HISTORY OF PRESENT ILLNESS  (Location/Symptom, Timing/Onset, Context/Setting, Quality, Duration, Modifying Factors, Severity.)   Otto Phoenix is a 62 y.o. female who presents to the emergency department with left shoulder pain. Patient states that she was pushing herself onto a bed 6 days ago when she felt sharp pain in left lateral shoulder. Pain is worse with movement. Patient is left-hand dominant. Patient denies numbness to extremity. Patient denies neck pain or back pain. Patient prefers [address] The history is provided by the patient. Nursing Notes were reviewed and I agree. REVIEW OF SYSTEMS    (2-9 systems for level 4, 10 or more for level 5)     Review of Systems   Respiratory: Negative for cough, chest tightness and shortness of breath. Musculoskeletal: Positive for arthralgias (Left shoulder). Negative for back pain and neck pain. Skin: Negative for rash and wound. Neurological: Negative for weakness and numbness. as noted above the remainder of the review of systems was reviewed and negative.        PAST MEDICAL HISTORY     Past Medical History:   Diagnosis Date    Arthritis     Arthritis     Atrophic kidney 12/23/2020    Bulging lumbar disc     lower back    Chronic back pain     Chronic headaches     Chronic kidney disease     Depression     Diabetes mellitus ([address]     Fibromyalgia     Fibromyalgia     Hypomagnesemia 11/9/2020    Osteoporosis with current pathological fracture with routine healing 7/20/2021    Recurrent major depressive disorder, in partial remission ([address] 3/5/2020         SURGICAL HISTORY       Past Surgical History:   Procedure Laterality U/F 32G X 4 MM MISC Disp-100 each,R-12,ZAHIDA, NormalUSE 1 TO INJECT INSULIN 4 TIMES DAILY      TRUE METRIX BLOOD GLUCOSE TEST strip USE 1 STRIP TO CHECK GLUCOSE 4 TIMES DAILY, Disp-100 each,R-12,DAWNormal      hydrOXYzine (VISTARIL) 25 MG capsule hydroxyzine pamoate 25 mg capsule BID PRN anxiety, Disp-90 capsule,R-0Normal      acetaminophen (TYLENOL) 500 MG tablet Take 2 tablets by mouth 3 times daily, Disp-180 tablet, R-0Normal      TRUEplus Lancets 33G MISC Historical Med      Blood Glucose Monitoring Suppl (TRUE METRIX AIR GLUCOSE METER) w/Device KIT USE TO CHECK GLUCOSE 4 TIMES DAILYHistorical Med       !! - Potential duplicate medications found. Please discuss with provider.           ALLERGIES     Bactrim [sulfamethoxazole-trimethoprim], Nsaids, Oxycodone, Statins, and Sulfa antibiotics    HISTORY       Family History   Problem Relation Age of Onset    Diabetes Mother     Arthritis Mother     Diabetes Father     Heart Attack Father     Arthritis Father     Heart Disease Father     Coronary Art Dis Father     Stroke Father           SOCIAL HISTORY       Social History     Socioeconomic History    Marital status:      Spouse name: None    Number of children: None    Years of education: None    Highest education level: None   Occupational History    None   Tobacco Use    Smoking status: Current Every Day Smoker     Packs/day: 1.50     Years: 44.00     Pack years: 66.00     Types: Cigarettes     Start date: 5/1/1976    Smokeless tobacco: Never Used   Vaping Use    Vaping Use: Never used   Substance and Sexual Activity    Alcohol use: Yes     Comment: occasionally    Drug use: Never    Sexual activity: None   Other Topics Concern    None   Social History Narrative    None     Social Determinants of Health     Financial Resource Strain: Low Risk     Difficulty of Paying Living Expenses: Not very hard   Food Insecurity: No Food Insecurity    Worried About Running Out of Food in the Last Year: Never true    Ran Out of Food in the Last Year: Never true   Transportation Needs: No Transportation Needs    Lack of Transportation (Medical): No    Lack of Transportation (Non-Medical): No   Physical Activity:     Days of Exercise per Week:     Minutes of Exercise per Session:    Stress:     Feeling of Stress :    Social Connections:     Frequency of Communication with Friends and Family:     Frequency of Social Gatherings with Friends and Family:     Attends Mu-ism Services:     Active Member of Clubs or Organizations:     Attends Club or Organization Meetings:     Marital Status:    Intimate Partner Violence:     Fear of Current or Ex-Partner:     Emotionally Abused:     Physically Abused:     Sexually Abused:        SCREENINGS    Aline Coma Scale  Eye Opening: Spontaneous  Best Verbal Response: Oriented  Best Motor Response: Obeys commands  Bridgette Coma Scale Score: 15      PHYSICAL EXAM    (up to 7 forlevel 4, 8 or more for level 5)     ED Triage Vitals [08/24/21 1411]   BP Temp Temp Source Pulse Resp SpO2 Height Weight   128/73 98.4 °F (36.9 °C) Oral 101 18 96 % 5' 1\" (1.549 m) 198 lb (89.8 kg)       Physical Exam  Vitals and nursing note reviewed. Constitutional:       General: She is not in acute distress. Appearance: She is well-developed. She is not diaphoretic. HENT:      Head: Normocephalic and atraumatic. Right Ear: External ear normal.      Left Ear: External ear normal.      Nose: Nose normal.   Eyes:      Conjunctiva/sclera: Conjunctivae normal.      Pupils: Pupils are equal, round, and reactive to light. Pulmonary:      Effort: Pulmonary effort is normal.   Musculoskeletal:        Arms:       Cervical back: Normal range of motion and neck supple. Comments: No swelling or redness noted. No LAD of axilla. Distal sensation and circulation fully intact.  are strong. Good range of motion of shoulder both passive and active.   No crepitus with range of motion. Good strength against resistance. Clavicle nontender. Skin:     General: Skin is warm and dry. Neurological:      Mental Status: She is alert and oriented to person, place, and time. Psychiatric:         Behavior: Behavior normal.           DIAGNOSTIC RESULTS     RADIOLOGY:   Non-plain film images such as CT, Ultrasound and MRI are read by the radiologist. Plain radiographic images are visualized and preliminarilyinterpreted by David Fishman PA-C with the below findings:    Left shoulder: No fracture dislocation    Patient notified that her X-rays will additionally be reviewed by a radiologist and she will be notified of any discrepancies. Interpretation per the Radiologist below, if available at the time of this note:    XR SHOULDER LEFT (MIN 2 VIEWS)   Final Result   NEGATIVE LEFT SHOULDER          LABS:  Labs Reviewed - No data to display    All other labs were within normal range or not returnedas of this dictation. EMERGENCYDEPARTMENT COURSE and DIFFERENTIAL DIAGNOSIS/MDM:   Vitals:    Vitals:    08/24/21 1411   BP: 128/73   Pulse: 101   Resp: 18   Temp: 98.4 °F (36.9 °C)   TempSrc: Oral   SpO2: 96%   Weight: 198 lb (89.8 kg)   Height: 5' 1\" (1.549 m)           MDM    Patient has a listed allergy to oxycodone. She reports having a drug problem years ago. She does not want home-going prescription. She cannot take NSAIDs due to renal dysfunction. She would like to have 1 oxycodone before discharge. She feels safe in doing so. She has no driving. Sling was applied for comfort. She will follow-up with orthopedics for further evaluation. Patient counseled that she may need further studies such as an MRI if her symptoms do not improve. Patient voiced understanding. OARRS: Lyrica only. No abuse suspected. PROCEDURES:    Procedures      FINAL IMPRESSION      1.  Strain of left shoulder, initial encounter          DISPOSITION/PLAN   DISPOSITION Decision To Discharge 08/24/2021 03:47:16 PM      PATIENT REFERRED TO:  Lenny Garcia.  [facility]  [address]  [address] [address]  [phone]  In 2 days        DISCHARGE MEDICATIONS:  Discharge Medication List as of 8/24/2021  3:47 PM          (Please note thatportions of this note were completed with a voice recognition program.  Efforts were made to edit the dictations but occasionally words are mis-transcribed.)    KEN Stroud PA-C  08/24/21 2342

## 2021-08-26 DIAGNOSIS — R93.5 ABNORMAL ULTRASOUND OF ABDOMEN: Primary | ICD-10-CM

## 2021-08-26 NOTE — RESULT ENCOUNTER NOTE
Please call patient. Ultrasound shows fatty liver which is not symmetrizing. It is a chronic condition related to obesity. Weight loss is the primary treatment. Important to lose weight because this condition can cause cirrhosis. Also, there appears to be a lymph node near the pancreas. It is unclear what this means. The radiologist has recommended a CT scan to look at it more closely. I have written an order for that scan and patient should be receiving phone call soon to schedule.

## 2021-09-01 ENCOUNTER — OFFICE VISIT (OUTPATIENT)
Dept: ORTHOPEDIC SURGERY | Age: 61
End: 2021-09-01
Payer: MEDICARE

## 2021-09-01 VITALS
WEIGHT: 196.8 LBS | TEMPERATURE: 97.5 F | HEART RATE: 81 BPM | OXYGEN SATURATION: 98 % | HEIGHT: 61 IN | BODY MASS INDEX: 37.16 KG/M2 | DIASTOLIC BLOOD PRESSURE: 76 MMHG | SYSTOLIC BLOOD PRESSURE: 110 MMHG

## 2021-09-01 DIAGNOSIS — M75.82 ROTATOR CUFF TENDONITIS, LEFT: Primary | ICD-10-CM

## 2021-09-01 DIAGNOSIS — F33.41 RECURRENT MAJOR DEPRESSIVE DISORDER, IN PARTIAL REMISSION (HCC): Chronic | ICD-10-CM

## 2021-09-01 DIAGNOSIS — M79.7 FIBROMYALGIA: Chronic | ICD-10-CM

## 2021-09-01 PROCEDURE — 99204 OFFICE O/P NEW MOD 45 MIN: CPT | Performed by: ORTHOPAEDIC SURGERY

## 2021-09-01 PROCEDURE — 20610 DRAIN/INJ JOINT/BURSA W/O US: CPT | Performed by: ORTHOPAEDIC SURGERY

## 2021-09-01 RX ORDER — TRIAMCINOLONE ACETONIDE 40 MG/ML
80 INJECTION, SUSPENSION INTRA-ARTICULAR; INTRAMUSCULAR ONCE
Status: COMPLETED | OUTPATIENT
Start: 2021-09-01 | End: 2021-09-01

## 2021-09-01 RX ORDER — LIDOCAINE HYDROCHLORIDE 10 MG/ML
8 INJECTION, SOLUTION INFILTRATION; PERINEURAL ONCE
Status: COMPLETED | OUTPATIENT
Start: 2021-09-01 | End: 2021-09-01

## 2021-09-01 RX ADMIN — TRIAMCINOLONE ACETONIDE 80 MG: 40 INJECTION, SUSPENSION INTRA-ARTICULAR; INTRAMUSCULAR at 14:33

## 2021-09-01 RX ADMIN — LIDOCAINE HYDROCHLORIDE 8 ML: 10 INJECTION, SOLUTION INFILTRATION; PERINEURAL at 14:32

## 2021-09-01 ASSESSMENT — ENCOUNTER SYMPTOMS
EYE ITCHING: 0
CONSTIPATION: 0
DIARRHEA: 0
SHORTNESS OF BREATH: 0
ABDOMINAL PAIN: 0
COUGH: 0
EYE PAIN: 0
EYE DISCHARGE: 0

## 2021-09-01 NOTE — PROGRESS NOTES
Patient ID:  Lissy Tubbs is a 62 y.o. female who presents today for evaluation of left shoulder pain. Injury: yes; The patient reports that approximately 2 weeks ago she was pushing herself up off of her bed and felt a sharp pop and stabbing pain in the left shoulder. She dealt with it for about a week and went to the emergency room on 8/24, where x-rays were obtained and she was placed into a sling. She has been wearing the sling ever since. Metal Allergy: no    Location: left shoulder pain, located on the global aspect of the shoulder  Pain: yes; 7 on a scale of 1 to 10  Onset: sudden  Duration: 2 weeks  Frequency:  occurs daily  Quality: sharp and stabbing   Swelling: patient does not note any swelling of the joint  Aggravating factors: Range of motion of the left shoulder.   Alleviating factors: rest  Mechanical symptoms: none  Radiation: no    Activities: Unable to do overhead activities  Restriction:  Unable to do overhead activities  Progression:  worsening    Previous treatment:  none  NSAIDs:  none  PT:  none    Medications:    Current Outpatient Medications on File Prior to Visit   Medication Sig Dispense Refill    metFORMIN (GLUCOPHAGE) 1000 MG tablet TAKE 1 TABLET BY MOUTH TWICE DAILY WITH MEALS 180 tablet 1    Iron, Ferrous Sulfate, 325 (65 Fe) MG TABS Take by mouth      NOVOLOG FLEXPEN 100 UNIT/ML injection pen INJECT 8-12  UNITS SUBCUTANEOUSLY THREE TIMES DAILY BEFORE MEAL(S) 15 pen 1    esomeprazole (NEXIUM) 40 MG delayed release capsule TAKE 1 CAPSULE BY MOUTH ONCE DAILY 90 capsule 1    pregabalin (LYRICA) 100 MG capsule TAKE 1 CAPSULE BY MOUTH TWICE DAILY FOR 90 DAYS 180 capsule 0    atenolol (TENORMIN) 25 MG tablet TAKE 1 TABLET BY MOUTH ONCE DAILY 90 tablet 1    BASAGLAR KWIKPEN 100 UNIT/ML injection pen INJECT 60 UNITS SUBCUTANEOUSLY ONCE DAILY 15 pen 1    DULoxetine (CYMBALTA) 60 MG extended release capsule Take 1 capsule by mouth twice daily 180 capsule 1    Magnesium 400 MG CAPS Take 1 capsule by mouth 2 times daily 90 capsule 4    Turmeric 500 MG CAPS Take by mouth 2 times daily      cyclobenzaprine (FLEXERIL) 10 MG tablet TAKE 1 TABLET BY MOUTH NIGHTLY AS NEEDED FOR MUSCLE SPASM      BD PEN NEEDLE STEFFANIE U/F 32G X 4 MM MISC USE 1 TO INJECT INSULIN 4 TIMES DAILY 100 each 12    TRUE METRIX BLOOD GLUCOSE TEST strip USE 1 STRIP TO CHECK GLUCOSE 4 TIMES DAILY 100 each 12    hydrOXYzine (VISTARIL) 25 MG capsule hydroxyzine pamoate 25 mg capsule BID PRN anxiety 90 capsule 0    acetaminophen (TYLENOL) 500 MG tablet Take 2 tablets by mouth 3 times daily 180 tablet 0    TRUEplus Lancets 33G MISC USE 1 TO CHECK GLUCOSE 4 TIMES DAILY      Blood Glucose Monitoring Suppl (TRUE METRIX AIR GLUCOSE METER) w/Device KIT USE TO CHECK GLUCOSE 4 TIMES DAILY       No current facility-administered medications on file prior to visit. Allergies: Allergies   Allergen Reactions    Bactrim [Sulfamethoxazole-Trimethoprim]     Nsaids Other (See Comments)     Kidney disease    Oxycodone      Patient reports she \"was addicted to it in the past\".     Statins     Sulfa Antibiotics        Past Medical History:    Past Medical History:   Diagnosis Date    Arthritis     Arthritis     Atrophic kidney 2020    Bulging lumbar disc     lower back    Chronic back pain     Chronic headaches     Chronic kidney disease     Depression     Diabetes mellitus [address]     Fibromyalgia     Fibromyalgia     Hypomagnesemia 2020    Osteoporosis with current pathological fracture with routine healing 2021    Recurrent major depressive disorder, in partial remission ([address] 3/5/2020       Past Surgical History:    Past Surgical History:   Procedure Laterality Date     SECTION      [id number],    FINGER TRIGGER RELEASE Left 2020    TRIGGER RELEASE MIDDLE FINGER AND RING FINGER LEFT HAND performed by Nikole Costa MD at [address] [place] [address] 11/3/2020    RELEASE OF RIGHT RING FINGER TRIGGER FINGER performed by Mera Gee MD at [facility]    kidney stone removal        Social History:    Social History     Socioeconomic History    Marital status:      Spouse name: Not on file    Number of children: Not on file    Years of education: Not on file    Highest education level: Not on file   Occupational History    Not on file   Tobacco Use    Smoking status: Current Every Day Smoker     Packs/day: 1.50     Years: 44.00     Pack years: 66.00     Types: Cigarettes     Start date: 5/1/1976    Smokeless tobacco: Never Used   Vaping Use    Vaping Use: Never used   Substance and Sexual Activity    Alcohol use: Yes     Comment: occasionally    Drug use: Never    Sexual activity: Not on file   Other Topics Concern    Not on file   Social History Narrative    Not on file     Social Determinants of Health     Financial Resource Strain: Low Risk     Difficulty of Paying Living Expenses: Not very hard   Food Insecurity: No Food Insecurity    Worried About Running Out of Food in the Last Year: Never true    Hortencia of Food in the Last Year: Never true   Transportation Needs: No Transportation Needs    Lack of Transportation (Medical): No    Lack of Transportation (Non-Medical):  No   Physical Activity:     Days of Exercise per Week:     Minutes of Exercise per Session:    Stress:     Feeling of Stress :    Social Connections:     Frequency of Communication with Friends and Family:     Frequency of Social Gatherings with Friends and Family:     Attends Confucianist Services:     Active Member of Clubs or Organizations:     Attends Club or Organization Meetings:     Marital Status:    Intimate Partner Violence:     Fear of Current or Ex-Partner:     Emotionally Abused:     Physically Abused:     Sexually Abused:        Family History:    Family History   Problem Relation Age of Onset    Diabetes Mother     Arthritis Mother     Diabetes Father     Heart Attack Father     Arthritis Father     Heart Disease Father     Coronary Art Dis Father     Stroke Father        Occupation:  on disability   - Occupational requirements:  none    Workers Compensation:  Have you missed work for this issue?  no  Is this issue being addressed under a worker's comp claim? no    Review of Systems:    Review of Systems   Constitutional: Negative for activity change, appetite change and chills. HENT: Negative for congestion, ear pain and hearing loss. Eyes: Negative for pain, discharge and itching. Respiratory: Negative for cough and shortness of breath. Cardiovascular: Negative for chest pain and leg swelling. Gastrointestinal: Negative for abdominal pain, constipation and diarrhea. Endocrine: Negative for cold intolerance, heat intolerance and polydipsia. Genitourinary: Negative for difficulty urinating, flank pain and frequency. Skin: Negative for rash and wound. Allergic/Immunologic: Negative for environmental allergies and food allergies. Neurological: Negative for dizziness, seizures and syncope.        Physical Exam:    Examination of the left shoulder    Inspection:  There is no evidence of swelling of the soft tissue; no erythema  Swelling:  none  Erythema:  none  Ecchymosis:  none  Palpation:  Pain on palpation over the supraspinatus muscle  Abduction:  Patient was able to abduct the right arm to the full overhead position but the left arm to only 90 degrees  Flexion:  Patient is able to flex the right arm to the full overhead position on the left side to about 115 degrees  External rotation: Patient was able to externally rotate it with the arms at the side to 40 degrees bilaterally   Internal rotation: Patient was able to internally rotate to the inferior border of the scapula on the right and the belt line on the left   Strength:  Patient has 5 out of 5 strength to abduction up until 90 degrees on the left side; she has 5 out of 5 strength to external rotation with the arm abducted and at the side; internal rotation is weak on the left side. As evidenced by pushoff sign. Neurological/Vascular:  Sensation is grossly intact the left upper extremity. Radiographs:  Radiographs of the left shoulder were personally reviewed, and they revealed:  No evidence of any fracture; no evidence of any degenerative changes    MRI: None    Diagnosis:   Diagnosis Orders   1. Rotator cuff tendonitis, left  Ambulatory referral to Physical Therapy    HI ARTHROCENTESIS ASPIR&/INJ MAJOR JT/BURSA W/O US       Plan:    The patient has rotator cuff tendinitis. I do not believe that she has a large tear. I think she nearly tweaked the muscle. Specifically, the supraspinatus and the subscapularis. Treatment options were discussed. Patient opted for an intra-articular steroid injection to be followed by physical therapy. She will follow up as needed upon completion of physical therapy. Before aspiration/injection risks/benefits of a cortisone injection including infection, local skin irritation, skin atrophy, continued pain/discomfort, elevated blood sugar, burning, failure to relieve pain, possible late infection were discussed with patient. Patient verbalized understanding and wanted to proceed with planned procedure. After prepping and draping the left shoulder in the usual sterile fashion, 2 cc of 40 mg/ml kenalog with 8 cc of 1% lidocaine were injected intra-articularly without any complications. Patient tolerated this well. Post-procedure discomfort can be alleviated with additional medications/ice/elevation/rest over the first 24 hours as recommended. The patient tolerated the procedure well.     If fluid was aspirated it was sent for further studies  in sterile specimen container as ordered to the lab    Orders Placed This Encounter   Procedures    Ambulatory referral to Physical Therapy

## 2021-09-02 RX ORDER — INSULIN GLARGINE 100 [IU]/ML
INJECTION, SOLUTION SUBCUTANEOUS
Qty: 15 PEN | Refills: 3 | Status: SHIPPED | OUTPATIENT
Start: 2021-09-02 | End: 2021-09-13 | Stop reason: SDUPTHER

## 2021-09-02 NOTE — TELEPHONE ENCOUNTER
Rx request   Requested Prescriptions     Pending Prescriptions Disp Refills    DULoxetine (CYMBALTA) 60 MG extended release capsule 180 capsule 1     Sig: Take 1 capsule by mouth twice daily    atenolol (TENORMIN) 25 MG tablet 90 tablet 1     Sig: TAKE 1 TABLET BY MOUTH ONCE DAILY     LOV 7/20/2021  Next Visit Date:  Future Appointments   Date Time Provider Taylor Tejada   9/14/2021  1:30 PM [place] [facility] ROOM 1 ML CT [facility]   10/6/2021  1:30 PM Nigel Hanson  [address]   10/13/2021  1:30 PM Venkata Albarran MD [address] [place] [address]

## 2021-09-03 RX ORDER — DULOXETIN HYDROCHLORIDE 60 MG/1
CAPSULE, DELAYED RELEASE ORAL
Qty: 180 CAPSULE | Refills: 1 | Status: SHIPPED | OUTPATIENT
Start: 2021-09-03 | End: 2022-03-30 | Stop reason: SDUPTHER

## 2021-09-03 RX ORDER — ATENOLOL 25 MG/1
TABLET ORAL
Qty: 90 TABLET | Refills: 1 | Status: SHIPPED | OUTPATIENT
Start: 2021-09-03 | End: 2021-09-28 | Stop reason: SDUPTHER

## 2021-09-13 DIAGNOSIS — Z79.4 TYPE 2 DIABETES MELLITUS WITH OTHER SPECIFIED COMPLICATION, WITH LONG-TERM CURRENT USE OF INSULIN (HCC): Primary | ICD-10-CM

## 2021-09-13 DIAGNOSIS — E11.69 TYPE 2 DIABETES MELLITUS WITH OTHER SPECIFIED COMPLICATION, WITH LONG-TERM CURRENT USE OF INSULIN (HCC): Primary | ICD-10-CM

## 2021-09-13 RX ORDER — INSULIN GLARGINE 100 [IU]/ML
INJECTION, SOLUTION SUBCUTANEOUS
Qty: 14 PEN | Refills: 3 | Status: SHIPPED | OUTPATIENT
Start: 2021-09-13 | End: 2021-11-19 | Stop reason: SDUPTHER

## 2021-09-14 ENCOUNTER — HOSPITAL ENCOUNTER (OUTPATIENT)
Dept: CT IMAGING | Age: 61
Discharge: HOME | End: 2021-09-16
Payer: MEDICARE

## 2021-09-14 DIAGNOSIS — R93.5 ABNORMAL ULTRASOUND OF ABDOMEN: ICD-10-CM

## 2021-09-14 PROCEDURE — 6360000004 HC RX CONTRAST MEDICATION: Performed by: FAMILY MEDICINE

## 2021-09-14 PROCEDURE — 74178 CT ABD&PLV WO CNTR FLWD CNTR: CPT

## 2021-09-14 RX ORDER — SODIUM CHLORIDE 0.9 % (FLUSH) 0.9 %
10 SYRINGE (ML) INJECTION
Status: DISPENSED | OUTPATIENT
Start: 2021-09-14 | End: 2021-09-14

## 2021-09-14 RX ADMIN — IOPAMIDOL 100 ML: 755 INJECTION, SOLUTION INTRAVENOUS at 13:21

## 2021-09-15 LAB
GFR AFRICAN AMERICAN: 55
GFR NON-AFRICAN AMERICAN: 46
PERFORMED ON: ABNORMAL
POC CREATININE: 1.2 MG/DL (ref 0.6–1.2)
POC SAMPLE TYPE: ABNORMAL

## 2021-09-16 NOTE — RESULT ENCOUNTER NOTE
Please call patient. CT does not show any cancer. It does show some slightly enlarged lymphnodes.  Please schedule DOXY or OV  in next two weeks to discuss next steps

## 2021-09-27 ENCOUNTER — OFFICE VISIT (OUTPATIENT)
Dept: FAMILY MEDICINE CLINIC | Age: 61
End: 2021-09-27
Payer: MEDICARE

## 2021-09-27 VITALS
RESPIRATION RATE: 16 BRPM | SYSTOLIC BLOOD PRESSURE: 122 MMHG | HEART RATE: 98 BPM | WEIGHT: 193 LBS | BODY MASS INDEX: 36.44 KG/M2 | OXYGEN SATURATION: 96 % | HEIGHT: 61 IN | DIASTOLIC BLOOD PRESSURE: 70 MMHG | TEMPERATURE: 96.9 F

## 2021-09-27 DIAGNOSIS — M79.7 FIBROMYALGIA: ICD-10-CM

## 2021-09-27 DIAGNOSIS — R93.2 ABNORMAL FINDINGS ON DIAGNOSTIC IMAGING OF LIVER AND BILIARY TRACT: ICD-10-CM

## 2021-09-27 DIAGNOSIS — N26.1 ATROPHIC KIDNEY: ICD-10-CM

## 2021-09-27 DIAGNOSIS — G47.33 OSA (OBSTRUCTIVE SLEEP APNEA): Primary | ICD-10-CM

## 2021-09-27 DIAGNOSIS — R59.0 LYMPHADENOPATHY, ABDOMINAL: ICD-10-CM

## 2021-09-27 DIAGNOSIS — R19.5 POSITIVE COLORECTAL CANCER SCREENING USING COLOGUARD TEST: ICD-10-CM

## 2021-09-27 DIAGNOSIS — K74.4 SECONDARY BILIARY CIRRHOSIS (HCC): ICD-10-CM

## 2021-09-27 DIAGNOSIS — Z00.00 ROUTINE GENERAL MEDICAL EXAMINATION AT A HEALTH CARE FACILITY: Primary | ICD-10-CM

## 2021-09-27 DIAGNOSIS — N13.9 OBSTRUCTED, UROPATHY: ICD-10-CM

## 2021-09-27 DIAGNOSIS — G47.10 HYPERSOMNIA: ICD-10-CM

## 2021-09-27 DIAGNOSIS — R59.0 RETROPERITONEAL LYMPHADENOPATHY: ICD-10-CM

## 2021-09-27 DIAGNOSIS — Z23 NEED FOR INFLUENZA VACCINATION: ICD-10-CM

## 2021-09-27 PROCEDURE — 99215 OFFICE O/P EST HI 40 MIN: CPT | Performed by: FAMILY MEDICINE

## 2021-09-27 PROCEDURE — G0008 ADMIN INFLUENZA VIRUS VAC: HCPCS | Performed by: FAMILY MEDICINE

## 2021-09-27 PROCEDURE — 90674 CCIIV4 VAC NO PRSV 0.5 ML IM: CPT | Performed by: FAMILY MEDICINE

## 2021-09-27 PROCEDURE — G0438 PPPS, INITIAL VISIT: HCPCS | Performed by: FAMILY MEDICINE

## 2021-09-27 RX ORDER — CYCLOBENZAPRINE HCL 10 MG
TABLET ORAL
Qty: 30 TABLET | Refills: 5 | Status: SHIPPED | OUTPATIENT
Start: 2021-09-27

## 2021-09-27 RX ORDER — UBIDECARENONE 75 MG
50 CAPSULE ORAL DAILY
COMMUNITY

## 2021-09-27 NOTE — PROGRESS NOTES
Subjective  Juliana Madrigal, 62 y.o. female presents today with:  Chief Complaint   Patient presents with    Discuss Labs           HPI    Complains of excessive sleep. Always tired. Is always taking naps. Sleep study in 2018 revealed patient did not enter REM sleep. Takes 3 naps a day in spite of frequent large doses of caffeine. She does snore. She does gasp awake. Apneic episodes do occur. Has nonrefreshing sleep. Occasionally awakens with headaches. Awakens every 2-3 hours during the night. Has lucid dreams. No apparent sleep paralysis. Wants med to make her stay awake. Microcytosis with positive cologuard. Has been taking iron pills and B12. Significant finding on recent CT scan. See below. No other questions and or concerns for today's visit      Review of Systems  No fevers, chills, sweats. No unintended weight loss. No abdominal pain, nausea, vomiting, diarrhea, constipation, bloody stools, black tarry stools. No rashes. No swollen glands.       Past Medical History:   Diagnosis Date    Arthritis     Arthritis     Atrophic kidney 2020    Bulging lumbar disc     lower back    Chronic back pain     Chronic headaches     Chronic kidney disease     Depression     Diabetes mellitus ([address]     Fibromyalgia     Fibromyalgia     Hypomagnesemia 2020    Osteoporosis with current pathological fracture with routine healing 2021    Recurrent major depressive disorder, in partial remission ([address] 3/5/2020     Past Surgical History:   Procedure Laterality Date     SECTION      9590,[id number],    FINGER TRIGGER RELEASE Left 2020    TRIGGER RELEASE MIDDLE FINGER AND RING FINGER LEFT HAND performed by Radha Estrada MD at [address] [place] [address] Right 11/3/2020    RELEASE OF RIGHT RING FINGER TRIGGER FINGER performed by Radha Estrada MD at [facility]    kidney stone removal      Social History [Sulfamethoxazole-Trimethoprim]     Nsaids Other (See Comments)     Kidney disease    Oxycodone      Patient reports she \"was addicted to it in the past\".  Statins     Sulfa Antibiotics      Current Outpatient Medications   Medication Sig Dispense Refill    vitamin B-12 (CYANOCOBALAMIN) 100 MCG tablet Take 50 mcg by mouth daily      cyclobenzaprine (FLEXERIL) 10 MG tablet TAKE 1 TABLET BY MOUTH NIGHTLY AS NEEDED FOR MUSCLE SPASM 30 tablet 5    BASAGLAR KWIKPEN 100 UNIT/ML injection pen INJECT 60 UNITS SUBCUTANEOUSLY ONCE DAILY E11.65   Patient need 3 boxes.  14 pen 3    DULoxetine (CYMBALTA) 60 MG extended release capsule Take 1 capsule by mouth twice daily 180 capsule 1    atenolol (TENORMIN) 25 MG tablet TAKE 1 TABLET BY MOUTH ONCE DAILY 90 tablet 1    metFORMIN (GLUCOPHAGE) 1000 MG tablet TAKE 1 TABLET BY MOUTH TWICE DAILY WITH MEALS 180 tablet 1    Iron, Ferrous Sulfate, 325 (65 Fe) MG TABS Take by mouth      NOVOLOG FLEXPEN 100 UNIT/ML injection pen INJECT 8-12  UNITS SUBCUTANEOUSLY THREE TIMES DAILY BEFORE MEAL(S) 15 pen 1    esomeprazole (NEXIUM) 40 MG delayed release capsule TAKE 1 CAPSULE BY MOUTH ONCE DAILY 90 capsule 1    pregabalin (LYRICA) 100 MG capsule TAKE 1 CAPSULE BY MOUTH TWICE DAILY FOR 90 DAYS 180 capsule 0    Magnesium 400 MG CAPS Take 1 capsule by mouth 2 times daily 90 capsule 4    Turmeric 500 MG CAPS Take by mouth 2 times daily      BD PEN NEEDLE STEFFANIE U/F 32G X 4 MM MISC USE 1 TO INJECT INSULIN 4 TIMES DAILY 100 each 12    TRUE METRIX BLOOD GLUCOSE TEST strip USE 1 STRIP TO CHECK GLUCOSE 4 TIMES DAILY 100 each 12    hydrOXYzine (VISTARIL) 25 MG capsule hydroxyzine pamoate 25 mg capsule BID PRN anxiety 90 capsule 0    acetaminophen (TYLENOL) 500 MG tablet Take 2 tablets by mouth 3 times daily 180 tablet 0    TRUEplus Lancets 33G MISC USE 1 TO CHECK GLUCOSE 4 TIMES DAILY      Blood Glucose Monitoring Suppl (TRUE METRIX AIR GLUCOSE METER) w/Device KIT USE TO CHECK [address], [place], Gastroenterology, [facility] [[id number] Custom]      PET CT SKULL BASE TO MID THIGH [62067 Custom]   - Future Order         Lymphadenopathy, abdominal        See above. [facility] [[id number] Custom]      PET CT SKULL BASE TO MID THIGH [[id number] Custom]   - Future Order         Secondary biliary cirrhosis ([address]        Worse, poor in that this is a new finding. Last ETOH binging was 2007. Referred to Dr. Ton Eckert. Dora Rousseau MD, Gastroenterology, [facility] [[id number] Custom]           Fibromyalgia        cyclobenzaprine (FLEXERIL) 10 MG tablet [2017]           Abnormal findings on diagnostic imaging of liver and biliary tract        PET CT SKULL BASE TO MID THIGH [[id number] Custom]   - Future Order         Need for influenza vaccination        INFLUENZA, MDCK QUADV, 2 YRS AND OLDER, IM, PF, PREFILL SYR OR SDV, 0.5ML (FLUCELVAX QUADV, PF) [[id number] Custom]           ORDERS WITHOUT AN ASSOCIATED DIAGNOSIS    vitamin B-12 (CYANOCOBALAMIN) 100 MCG tablet [8653]            Time spent on appt: 45 minutes. Reviewed with the patient: all disease processes, current clinical status, medications, activities and diet.      Side effects, adverse effects of the medication prescribed today, as well as treatment plan/ rationale and result expectations have been discussed with the patient who expresses understanding and desires to proceed.     Close follow up to evaluate treatment results and for coordination of care. I have reviewed the patient's medical history in detail and updated the computerized patient record. More than 50% of the appointment was spent in face-to-face counseling, education and care coordination.     Please note this report has been partially produced using speech recognition software and may contain mistakes related to that system including errors in grammar, punctuation and spelling as well as words and phrases that may 1) Sleep Evaluation Orders Form      2) Office note justifying study      3) Demographic info / insurance card     Order Specific Question:   Adult or Pediatric     Answer:   Adult Study (>7 Years)     Order Specific Question:   Location For Sleep Study     Answer:   [place]     Order Specific Question:   Select Sleep Lab Location     Answer:   [facility]     Comments:   Hiram     Orders Placed This Encounter   Medications    cyclobenzaprine (FLEXERIL) 10 MG tablet     Sig: TAKE 1 TABLET BY MOUTH NIGHTLY AS NEEDED FOR MUSCLE SPASM     Dispense:  30 tablet     Refill:  5     Medications Discontinued During This Encounter   Medication Reason    cyclobenzaprine (FLEXERIL) 10 MG tablet REORDER     Return in about 2 weeks (around 10/11/2021) for f/u studies and appts - OV. Controlled Substance Monitoring:    Acute and Chronic Pain Monitoring:   No flowsheet data found.         Bubba Aguila MD

## 2021-09-27 NOTE — PROGRESS NOTES
Medicare Annual Wellness Visit  Name: Yuri Olson Date: 2021   MRN: [id number] Sex: Female   Age: 62 y.o. Ethnicity: Non- / Non    : 1960 Race: White (non-)      Salud Herzog is here for No chief complaint on file. Screenings for behavioral, psychosocial and functional/safety risks, and cognitive dysfunction are all negative except as indicated below. These results, as well as other patient data from the [address] [facility] [address] form, are documented in Flowsheets linked to this Encounter. Allergies   Allergen Reactions    Bactrim [Sulfamethoxazole-Trimethoprim]     Nsaids Other (See Comments)     Kidney disease    Oxycodone      Patient reports she \"was addicted to it in the past\".  Statins     Sulfa Antibiotics          Prior to Visit Medications    Medication Sig Taking? Authorizing Provider   vitamin B-12 (CYANOCOBALAMIN) 100 MCG tablet Take 50 mcg by mouth daily  Historical Provider, MD   cyclobenzaprine (FLEXERIL) 10 MG tablet TAKE 1 TABLET BY MOUTH NIGHTLY AS NEEDED FOR MUSCLE SPASM  Michael Cobb MD   BASAGLAR KWIKPEN 100 UNIT/ML injection pen INJECT 60 UNITS SUBCUTANEOUSLY ONCE DAILY E11.65   Patient need 3 boxes.   Caitlin Hodges MD   DULoxetine (CYMBALTA) 60 MG extended release capsule Take 1 capsule by mouth twice daily  Michael Cobb MD   atenolol (TENORMIN) 25 MG tablet TAKE 1 TABLET BY MOUTH ONCE DAILY  Michael Cobb MD   metFORMIN (GLUCOPHAGE) 1000 MG tablet TAKE 1 TABLET BY MOUTH TWICE DAILY WITH MEALS  Nigel Joseph MD   Iron, Ferrous Sulfate, 325 (65 Fe) MG TABS Take by mouth  Historical Provider, MD   NOVOLOG FLEXPEN 100 UNIT/ML injection pen INJECT 8-12  UNITS SUBCUTANEOUSLY THREE TIMES DAILY BEFORE MEAL(S)  Caitlin Hodges MD   esomeprazole (NEXIUM) 40 MG delayed release capsule TAKE 1 CAPSULE BY MOUTH ONCE DAILY  Michael Cobb MD   pregabalin (LYRICA) 100 MG capsule TAKE 1 CAPSULE BY MOUTH TWICE DAILY FOR 90 DAYS  Aida Simental MD   Magnesium 400 MG CAPS Take 1 capsule by mouth 2 times daily  Aida Simental MD   Turmeric 500 MG CAPS Take by mouth 2 times daily  Historical Provider, MD   BD PEN NEEDLE STEFFANIE U/F 32G X 4 MM MISC USE 1 TO INJECT INSULIN 4 TIMES DAILY  Aida Simental MD   TRUE METRIX BLOOD GLUCOSE TEST strip USE 1 STRIP TO CHECK GLUCOSE 4 TIMES DAILY  Aida Simental MD   hydrOXYzine (VISTARIL) 25 MG capsule hydroxyzine pamoate 25 mg capsule BID PRN anxiety  Aida Simental MD   acetaminophen (TYLENOL) 500 MG tablet Take 2 tablets by mouth 3 times daily  Saniya Kaur PA-C   TRUEplus Lancets 33G MISC USE 1 TO CHECK GLUCOSE 4 TIMES DAILY  Historical Provider, MD   Blood Glucose Monitoring Suppl (TRUE METRIX AIR GLUCOSE METER) w/Device KIT USE TO CHECK GLUCOSE 4 TIMES DAILY  Historical Provider, MD         Past Medical History:   Diagnosis Date    Arthritis     Arthritis     Atrophic kidney 2020    Bulging lumbar disc     lower back    Chronic back pain     Chronic headaches     Chronic kidney disease     Depression     [facility]     Fibromyalgia     Fibromyalgia     Hypomagnesemia 2020    Osteoporosis with current pathological fracture with routine healing 2021    Recurrent major depressive disorder, in partial remission ([facility] 3/5/2020       Past Surgical History:   Procedure Laterality Date     SECTION      [id number],    FINGER TRIGGER RELEASE Left 2020    TRIGGER RELEASE MIDDLE FINGER AND RING FINGER LEFT HAND performed by Sanjuan Cockayne, MD at [address] [place] [address] Right 11/3/2020    RELEASE OF RIGHT RING FINGER TRIGGER FINGER performed by Sanjuan Cockayne, MD at [facility]    kidney stone removal          Family History   Problem Relation Age of Onset    Diabetes Mother    Mayte Curl Arthritis Mother    Mayte Curl Diabetes Father     Heart Attack Father     Arthritis Father     Heart Disease Father     Coronary Art Dis Father     Stroke Father        CareTeam (Including outside providers/suppliers regularly involved in providing care):   Patient Care Team:  Steven Meadows MD as PCP - General (Family Medicine)  Steven Meadows MD [facility] PCP - [facility]    Wt Readings from Last 3 Encounters:   09/27/21 193 lb (87.5 kg)   09/01/21 196 lb 12.8 oz (89.3 kg)   08/24/21 198 lb (89.8 kg)     There were no vitals filed for this visit. There is no height or weight on file to calculate BMI. Based upon direct observation of the patient, evaluation of cognition reveals recent and remote memory intact. Patient's complete Health Risk Assessment and screening values have been reviewed and are found in Flowsheets. The following problems were reviewed today and where indicated follow up appointments were made and/or referrals ordered. Positive Risk Factor Screenings with Interventions:         Substance History:  Social History     Tobacco History     Smoking Status  Current Every Day Smoker Smoking Start Date  5/1/1976 Smoking Frequency  1.5 packs/day for 44 years (77 pk yrs) Smoking Tobacco Type  Cigarettes    Smokeless Tobacco Use  Never Used          Alcohol History     Alcohol Use Status  Yes Comment  occasionally          Drug Use     Drug Use Status  Never          Sexual Activity     Sexually Active  Not Asked               Alcohol Screening:       A score of 8 or more is associated with harmful or hazardous drinking. A score of 13 or more in women, and 15 or more in men, is likely to indicate alcohol dependence. Substance Abuse Interventions:  · No intervention indicated.     General Health and ACP:     Advance Directives     Power of  Living Will ACP-Advance Directive ACP-Power of     Not on File Not on File Not on File Not on [address] [facility] [address] Interventions:  · No intervention indicated    Health Habits/Nutrition:        Health Habits/Nutrition Interventions:  · Inadequate physical activity:  educational materials provided to promote increased physical activity  · Nutritional issues:  educational materials for healthy, well-balanced diet provided  · Dental exam overdue:  patient encouraged to make appointment with his/her dentist       Personalized Preventive Plan   Current Health Maintenance Status  Immunization History   Administered Date(s) Administered    COVID-19, Moderna, PF, 100mcg/0.5mL 06/02/2021, 07/02/2021    Influenza Virus Vaccine 10/03/2018, 09/16/2019    Influenza, Quadv, IM, (6 mo and older Fluzone, Flulaval, Fluarix and 3 yrs and older Afluria) 09/29/2020    Pneumococcal Polysaccharide ([id number]) 09/29/2020    Tdap (Boostrix, Adacel) 07/11/2018        Health Maintenance   Topic Date Due    Diabetic retinal exam  Never done    Cervical cancer screen  Never done    Shingles Vaccine (1 of 2) Never done   [employer] Visit (AWV)  Never done    Breast cancer screen  06/11/2021    Flu vaccine (1) 09/01/2021    Diabetic foot exam  09/29/2021    Diabetic microalbuminuria test  10/16/2021    Lipid screen  10/16/2021    Low dose CT lung screening  12/18/2021    A1C test (Diabetic or Prediabetic)  07/07/2022    Colon cancer screen fecal DNA test (Cologuard)  10/23/2023    Pneumococcal 0-64 years Vaccine (2 of 2 - PPSV23) 04/21/2026    DTaP/Tdap/Td vaccine (2 - Td or Tdap) 07/11/2028    COVID-19 Vaccine  Completed    Hepatitis C screen  Completed    HIV screen  Completed    Hepatitis A vaccine  Aged Out    Hib vaccine  Aged Out    Meningococcal (ACWY) vaccine  Aged Out     Recommendations for 1bib Due: see orders and patient instructions/AVS.  .   Recommended screening schedule for the next 5-10 years is provided to the patient in written form: see Patient Instructions/AVS.    There are no diagnoses linked to this encounter.

## 2021-09-27 NOTE — PATIENT INSTRUCTIONS
Personalized Preventive Plan for Barrington Suresh - 9/27/2021  Medicare offers a range of preventive health benefits. Some of the tests and screenings are paid in full while other may be subject to a deductible, co-insurance, and/or copay. Some of these benefits include a comprehensive review of your medical history including lifestyle, illnesses that may run in your family, and various assessments and screenings as appropriate. After reviewing your medical record and screening and assessments performed today your provider may have ordered immunizations, labs, imaging, and/or referrals for you. A list of these orders (if applicable) as well as your Preventive Care list are included within your After Visit Summary for your review. Other Preventive Recommendations:    · A preventive eye exam performed by an eye specialist is recommended every 1-2 years to screen for glaucoma; cataracts, macular degeneration, and other eye disorders. · A preventive dental visit is recommended every 6 months. · Try to get at least 150 minutes of exercise per week or 10,000 steps per day on a pedometer . · Order or download the FREE \"Exercise & Physical Activity: Your Everyday Guide\" from [employer]. Call 0-865.845.5677 or search [employer] online. · You need 0250-0896 mg of calcium and 2403-4866 IU of vitamin D per day. It is possible to meet your calcium requirement with diet alone, but a vitamin D supplement is usually necessary to meet this goal.  · When exposed to the sun, use a sunscreen that protects against both UVA and UVB radiation with an SPF of 30 or greater. Reapply every 2 to 3 hours or after sweating, drying off with a towel, or swimming. · Always wear a seat belt when traveling in a car. Always wear a helmet when riding a bicycle or motorcycle. High-Fiber Diet     What Is Fiber?    Dietary fiber is a form of carbohydrate found in plants that cannot be digested by humans. All plants contain fiber, including fruits, vegetables, grains, and legumes. Fiber is often classified into two categories: soluble and insoluble. Soluble fiber draws water into the bowel and can help slow digestion. Examples of foods that are high in soluble fiber include oatmeal, oat bran, barley, legumes (eg, beans and peas), apples, and strawberries. Insoluble fiber speeds digestion and can add bulk to the stool. Examples of foods that are high in insoluble fiber include whole-wheat products, wheat bran, cauliflower, green beans, and potatoes. Why Follow a High-Fiber Diet? A high-fiber diet is often recommended to prevent and treat constipation , hemorrhoids , diverticulitis , and irritable bowel syndrome . Eating a high-fiber diet can also help improve your cholesterol levels, lower your risk of coronary heart disease , reduce your risk of type 2 diabetes , and lower your weight. For people with type 1 or 2 diabetes, a high-fiber diet can also help stabilize blood sugar levels. How Much Fiber Should I Eat? A high-fiber diet should contain  20-35 grams  of fiber a day. This is actually the amount recommended for the general adult population; however, most Americans eat only 15 grams of fiber per day. Digestion of Fiber   Eating a higher fiber diet than usual can take some getting used to by your body's digestive system. To avoid the side effects of sudden increases in dietary fiber (eg, gas, cramping, bloating, and diarrhea), increase fiber gradually and be sure to drink plenty of fluids every day. Tips for Increasing Fiber Intake   Whenever possible, choose whole grains over refined grains (eg, brown rice instead of white rice, whole-wheat bread instead of white bread). Include a variety of grains in your diet, such as wheat, rye, barley, oats, quinoa, and bulgur. Eat more vegetarian-based meals.  Here are some ideas: black bean burgers, eggplant lasagna, and veggie tofu stir-woodard. Choose high-fiber snacks, such as fruits, popcorn, whole-grain crackers, and nuts. Make whole-grain cereal or whole-grain toast part of your daily breakfast regime. When eating out, whether ordering a sandwich or dinner, ask for extra vegetables. When baking, replace part of the white flour with whole-wheat flour. Whole-wheat flour is particularly easy to incorporate into a recipe. High-Fiber Diet Eating Guide   Food Category   Foods Recommended   Notes   Grains   Whole-grain breads, muffins, bagels, or leta bread Rye bread Whole-wheat crackers or crisp breads Whole-grain or bran cereals Oatmeal, oat bran, or grits Wheat germ Whole-wheat pasta and brown rice   Read the ingredients list on food labels. Look for products that list \"whole\" as the first ingredient (eg, whole-wheat, whole oats). Choose cereals with at least 2 grams of fiber per serving. Vegetables   All vegetables, especially asparagus, bean sprouts, broccoli, [place] sprouts, cabbage, carrots, cauliflower, celery, corn, greens, green beans, green pepper, onions, peas, potatoes (with skin), snow peas, spinach, squash, sweet potatoes, tomatoes, zucchini   For maximum fiber intake, eat the peels of fruits and vegetablesjust be sure to wash them well first.   Fruits   All fruits, especially apples, berries, grapefruits, mangoes, nectarines, oranges, peaches, pears, dried fruits (figs, dates, prunes, raisins)   Choose raw fruits and vegetables over juice, cooked, or cannedraw fruit has more fiber. Dried fruit is also a good source of fiber. Milk   With the exception of yogurt containing inulin (a type of fiber), dairy foods provide little fiber. Add more fiber by topping your yogurt or cottage cheese with fresh fruit, whole grain or bran cereals, nuts, or seeds.    Meats and Beans   All beans and peas, especially Garbanzo beans, kidney beans, lentils, lima beans, split peas, and santo beans All nuts and seeds, especially almonds, peanuts, [place] nuts, cashews, peanut butter, walnuts, sesame and sunflower seeds All meat, poultry, fish, and eggs   Increase fiber in meat dishes by adding santo beans, kidney beans, black-eyed peas, bran, or oatmeal. If you are following a low-fat diet, use nuts and seeds only in moderation. Fats and Oils   All in moderation   Fats and oils do not provide fiber   Snacks, Sweets, and Condiments   Fruit Nuts Popcorn, whole-wheat pretzels, or trail mix made with dried fruits, nuts, and seeds Cakes, breads, and cookies made with oatmeal or whole-wheat flour   Most snack foods do not provide much fiber. Choose snacks with at least 2 grams of fiber per serving. Last Reviewed: March 2011 Elizabeth Ta MS, MPH, RD   Updated: 3/29/2011   ·     Keep Your Memory Donelda Goodpasture       Many factors can affect your ability to remembera hectic lifestyle, aging, stress, chronic disease, and certain medicines. But, there are steps you can take to sharpen your mind and help preserve your memory. Challenge Your Brain   Regularly challenging your mind may help keeps it in top shape. Good mental exercises include:   Crossword puzzlesUse a dictionary if you need it; you will learn more that way. Brainteasers Try some! Crafts, such as wood working and sewing   Hobbies, such as gardening and building model airplanes   SocializingVisit old friends or join groups to meet new ones. Reading   Learning a new language   Taking a class, whether it be art history or xuan chi   TravelingExperience the food, history, and culture of your destination   Learning to use a computer   Going to museums, the theater, or thought-provoking movies   Changing things in your daily life, such as reversing your pattern in the grocery store or brushing your teeth using your nondominant hand   Use Memory Aids   There is no need to remember every detail on your own.  These memory aids can help:   Calendars and day planners   Electronic organizers to store all sorts of helpful informationThese devices can \"beep\" to remind you of appointments. A book of days to record birthdays, anniversaries, and other occasions that occur on the same date every year   Detailed \"to-do\" lists and strategically placed sticky notes   Quick \"study\" sessionsBefore a gathering, review who will be there so their names will be fresh in your mind. Establish routinesFor example, keep your keys, wallet, and umbrella in the same place all the time or take medicine with your 8:00 AM glass of juice   Live a Healthy Life   Many actions that will keep your body strong will do the same for your mind. For example:   Talk to Your Doctor About Herbs and Supplements    Malnutrition and vitamin deficiencies can impair your mental function. For example, vitamin B12 deficiency can cause a range of symptoms, including confusion. But, what if your nutritional needs are being met? Can herbs and supplements still offer a benefit? Researchers have investigated a range of natural remedies, such as ginkgo , ginseng , and the supplement phosphatidylserine (PS). So far, though, the evidence is inconsistent as to whether these products can improve memory or thinking. If you are interested in taking herbs and supplements, talk to your doctor first because they may interact with other medicines that you are taking. Exercise Regularly    Among the many benefits of regular exercise are increased blood flow to the brain and decreased risk of certain diseases that can interfere with memory function. One study found that even moderate exercise has a beneficial effect. Examples of \"moderate\" exercise include:   Playing 18 holes of golf once a week, without a cart   Playing tennis twice a week   Walking one mile per day   Manage Stress    It can be tough to remember what is important when your mind is cluttered. Make time for relaxation. Choose activities that calm you down, and make it routine.    Manage Chronic Conditions    Side effects of high blood pressure , diabetes, and heart disease can interfere with mental function. Many of the lifestyle steps discussed here can help manage these conditions. Strive to eat a healthy diet, exercise regularly, get stress under control, and follow your doctor's advice for your condition. Minimize Medications    Talk to your doctor about the medicines that you take. Some may be unnecessary. Also, healthy lifestyle habits may lower the need for certain drugs. Last Reviewed: April 2010 London Kent MD   Updated: 4/13/2010   ·     [address]       As we get older, changes in balance, gait, strength, vision, hearing, and cognition make even the most youthful senior more prone to accidents. Falls are one of the leading health risks for older people. This increased risk of falling is related to:   Aging process (eg, decreased muscle strength, slowed reflexes)   Higher incidence of chronic health problems (eg, arthritis, diabetes) that may limit mobility, agility or sensory awareness   Side effects of medicine (eg, dizziness, blurred vision)especially medicines like prescription pain medicines and drugs used to treat mental health conditions   Depending on the brittleness of your bones, the consequences of a fall can be serious and long lasting. Home Life   Research by the Association of Aging [facility]) shows that some home accidents among older adults can be prevented by making simple lifestyle changes and basic modifications and repairs to the home environment. Here are some lifestyle changes that experts recommend:   Have your hearing and vision checked regularly. Be sure to wear prescription glasses that are right for you. Speak to your doctor or pharmacist about the possible side effects of your medicines. A number of medicines can cause dizziness. If you have problems with sleep, talk to your doctor. Limit your intake of alcohol.    If necessary, use a cane or walker to help maintain your balance. Wear supportive, rubber-soled shoes, even at home. If you live in a region that gets wintry weather, you may want to put special cleats on your shoes to prevent you from slipping on the snow and ice. Exercise regularly to help maintain muscle tone, agility, and balance. Always hold the banister when going up or down stairs. Also, use  bars when getting in or out of the bath or shower, or using the toilet. To avoid dizziness, get up slowly from a lying down position. Sit up first, dangling your legs for a minute or two before rising to a standing position. Overall Home Safety Check   According to the [employer] Safety [employer]'s \"Older Consumer Home Safety Checklist,\" it is important to check for potential hazards in each room. And remember, proper lighting is an essential factor in home safety. If you cannot see clearly, you are more likely to fall. Important questions to ask yourself include:   Are lamp, electric, extension, and telephone cords placed out of the flow of traffic and maintained in good condition? Have frayed cords been replaced? Are all small rugs and runners slip resistant? If not, you can secure them to the floor with a special double-sided carpet tape. Are smoke detectors properly locatedone on every floor of your home and one outside of every sleeping area? Are they in good working order? Are batteries replaced at least once a year? Do you have a well-maintained carbon monoxide detector outside every sleeping are in your home? Does your furniture layout leave plenty of space to maneuver between and around chairs, tables, beds, and sofas? Are hallways, stairs and passages between rooms well lit? Can you reach a lamp without getting out of bed? Are floor surfaces well maintained? Shag rugs, high-pile carpeting, tile floors, and polished wood floors can be particularly slippery.  Stairs should always have handrails and be carpeted or fitted with a non-skid tread. Is your telephone easily reachable. Is the cord safely tucked away? Room by Room   According to the Association of Aging, bathrooms and nii are the two most potentially hazardous rooms in your home. In the Kitchen    Be sure your stove is in proper working order and always make sure burners and the oven are off before you go out or go to sleep. Keep pots on the back burners, turn handles away from the front of the stove, and keep stove clean and free of grease build-up. Kitchen ventilation systems and range exhausts should be working properly. Keep flammable objects such as towels and pot holders away from the cooking area except when in use. Make sure kitchen curtains are tied back. Move cords and appliances away from the sink and hot surfaces. If extension cords are needed, install wiring guides so they do not hang over the sink, range, or working areas. Look for coffee pots, kettles and toaster ovens with automatic shut-offs. Keep a mop handy in the kitchen so you can wipe up spills instantly. You should also have a small fire extinguisher. Arrange your kitchen with frequently used items on lower shelves to avoid the need to stand on a stepstool to reach them. Make sure countertops are well-lit to avoid injuries while cutting and preparing food. In the Bathroom    Use a non-slip mat or decals in the tub and shower, since wet, soapy tile or porcelain surfaces are extremely slippery. Make sure bathroom rugs are non-skid or tape them firmly to the floor. Bathtubs should have at least one, preferably two, grab bars, firmly attached to structural supports in the wall. (Do not use built-in soap holders or glass shower doors as grab bars.)    Tub seats fitted with non-slip material on the legs allow you to wash sitting down. For people with limited mobility, bathtub transfer benches allow you to slide safely into the tub.     Raised toilet seats and toilet safety rails are helpful for those with knee or hip problems. In the [facility]    Make sure you use a nightlight and that the area around your bed is clear of potential obstacles. Be careful with electric blankets and never go to sleep with a heating pad, which can cause serious burns even if on a low setting. Use fire-resistant mattress covers and pillows, and NEVER smoke in bed. Keep a phone next to the bed that is programmed to dial 911 at the push of a button. If you have a chronic condition, you may want to sign on with an automatic call-in service. Typically the system includes a small pendant that connects directly to an emergency medical voice-response system. You should also make arrangements to stay in contact with someonefriend, neighbor, family memberon a regular schedule. Fire Prevention   According to the [employer] (Smoke Alarms for Every) [address] [place] [address], senior citizens are one of the two highest risk groups for death and serious injuries due to residential fires. When cooking, wear short-sleeved items, never a bulky long-sleeved robe. The [facility]'s Safety Checklist for Older Consumers emphasizes the importance of checking basements, garages, workshops and storage areas for fire hazards, such as volatile liquids, piles of old rags or clothing and overloaded circuits. Never smoke in bed or when lying down on a couch or recliner chair. Small portable electric or kerosene heaters are responsible for many home fires and should be used cautiously if at all. If you do use one, be sure to keep them away from flammable materials. In case of fire, make sure you have a pre-established emergency exit plan. Have a professional check your fireplace and other fuel-burning appliances yearly.     Helping Hands   Baby boomers entering the solorzano years will continue to see the development of new products to help older adults live safely and independently in spite of age-related changes. Making Life More Livable  , by Dimitrios Fitzgerald, lists over 1,000 products for \"living well in the mature years,\" such as bathing and mobility aids, household security devices, ergonomically designed knives and peelers, and faucet valves and knobs for temperature control. Medical supply stores and organizations are good sources of information about products that improve your quality of life and insure your safety.      Last Reviewed: November 2009 Negra Madrigal MD   Updated: 3/7/2011     ·

## 2021-09-27 NOTE — PROGRESS NOTES
Vaccine Information Sheet, \"Influenza - Inactivated\"  given to Otto Phoenix, or parent/legal guardian of  Otto Phoenix and verbalized understanding. Patient responses:    Have you ever had a reaction to a flu vaccine? No  Are you able to eat eggs without adverse effects? Yes  Do you have any current illness? No  Have you ever had [place] Syndrome? No    Flu vaccine given per order. Please see immunization tab.

## 2021-09-28 DIAGNOSIS — Z79.4 TYPE 2 DIABETES MELLITUS WITH DIABETIC NEUROPATHY, WITH LONG-TERM CURRENT USE OF INSULIN (HCC): Chronic | ICD-10-CM

## 2021-09-28 DIAGNOSIS — E11.40 TYPE 2 DIABETES MELLITUS WITH DIABETIC NEUROPATHY, WITH LONG-TERM CURRENT USE OF INSULIN (HCC): Chronic | ICD-10-CM

## 2021-09-29 RX ORDER — PREGABALIN 100 MG/1
CAPSULE ORAL
Qty: 180 CAPSULE | Refills: 0 | Status: SHIPPED | OUTPATIENT
Start: 2021-09-29 | End: 2022-01-02 | Stop reason: SDUPTHER

## 2021-09-29 RX ORDER — ATENOLOL 25 MG/1
TABLET ORAL
Qty: 90 TABLET | Refills: 1 | Status: SHIPPED | OUTPATIENT
Start: 2021-09-29 | End: 2022-01-02 | Stop reason: SDUPTHER

## 2021-09-29 RX ORDER — ESOMEPRAZOLE MAGNESIUM 40 MG/1
CAPSULE, DELAYED RELEASE ORAL
Qty: 90 CAPSULE | Refills: 1 | Status: SHIPPED | OUTPATIENT
Start: 2021-09-29 | End: 2022-01-02 | Stop reason: SDUPTHER

## 2021-09-29 NOTE — TELEPHONE ENCOUNTER
Rx request   Requested Prescriptions     Pending Prescriptions Disp Refills    pregabalin (LYRICA) 100 MG capsule 180 capsule 0     Sig: TAKE 1 CAPSULE BY MOUTH TWICE DAILY FOR 90 DAYS    esomeprazole (NEXIUM) 40 MG delayed release capsule 90 capsule 1     Sig: TAKE 1 CAPSULE BY MOUTH ONCE DAILY    atenolol (TENORMIN) 25 MG tablet 90 tablet 1     Sig: TAKE 1 TABLET BY MOUTH ONCE DAILY     LOV 9/27/2021  Next Visit Date:  Future Appointments   Date Time Provider Taylor Tejada   10/4/2021  1:30 PM Pepe Almanzar MD [facility]   10/6/2021  1:30 PM Layo Couch MD [facility]   10/13/2021  1:30 PM Aimee Wilson MD [facility]   11/10/2021  3:00 PM Aimee Wilson MD [facility]   9/28/2022  1:30 PM Aimee Wilson MD [address] [place] [address]

## 2021-10-08 ENCOUNTER — TELEPHONE (OUTPATIENT)
Dept: FAMILY MEDICINE CLINIC | Age: 61
End: 2021-10-08

## 2021-10-11 ENCOUNTER — OFFICE VISIT (OUTPATIENT)
Dept: UROLOGY | Age: 61
End: 2021-10-11
Payer: MEDICARE

## 2021-10-11 VITALS
HEIGHT: 61 IN | SYSTOLIC BLOOD PRESSURE: 112 MMHG | HEART RATE: 78 BPM | DIASTOLIC BLOOD PRESSURE: 74 MMHG | WEIGHT: 193 LBS | BODY MASS INDEX: 36.44 KG/M2

## 2021-10-11 DIAGNOSIS — N20.0 KIDNEY STONE: Primary | ICD-10-CM

## 2021-10-11 LAB
BILIRUBIN, POC: ABNORMAL
BLOOD URINE, POC: ABNORMAL
CLARITY, POC: CLEAR
COLOR, POC: YELLOW
GLUCOSE URINE, POC: ABNORMAL
KETONES, POC: ABNORMAL
LEUKOCYTE EST, POC: ABNORMAL
NITRITE, POC: ABNORMAL
PH, POC: 6
PROTEIN, POC: ABNORMAL
SPECIFIC GRAVITY, POC: 1.01
UROBILINOGEN, POC: 0.2

## 2021-10-11 PROCEDURE — 81003 URINALYSIS AUTO W/O SCOPE: CPT | Performed by: UROLOGY

## 2021-10-11 PROCEDURE — 99204 OFFICE O/P NEW MOD 45 MIN: CPT | Performed by: UROLOGY

## 2021-10-11 RX ORDER — ALFUZOSIN HYDROCHLORIDE 10 MG/1
10 TABLET, EXTENDED RELEASE ORAL DAILY
Qty: 30 TABLET | Refills: 0 | Status: SHIPPED | OUTPATIENT
Start: 2021-10-11

## 2021-10-11 ASSESSMENT — ENCOUNTER SYMPTOMS
SHORTNESS OF BREATH: 0
RESPIRATORY NEGATIVE: 1
BACK PAIN: 1

## 2021-10-11 NOTE — PROGRESS NOTES
Subjective:      Patient ID: Severa Crimes is a 62 y.o. female    HPI  This is a 63 yo female with h/o CBP, DM (HbA1c 9.5), Fibromyalgia, OA, Depression, CRI, and sent for finding of Lt renal atrophy with lower pole calcification and possible Rt distal ureteral stone. She has a long h/o kidney stones in  she had open stone surgery and over the years has had 2 URS with laser lithotripsy and 2 ESWL for stone treatment. She has also spontaneously passed stones. She has no recent renal colic but has chronic lumbar pain. She was told her Lt kidney was atrophic in the past. This at least dates back to a chest CT done in  here at [facility]. She has no flank pain. She has no F/C or N/V. She has no recent UTI's. She has no hematuria or dysuria. She has no IVS and no other complaints. She saw Nephrology as well. She is to get a PET scan for further evaluation of adenopathy. She smokes 1.5 ppd for 45 yrs of ciggs. She has no family h/o  malignancies. I reviewed the CT on PACS personally today and I am not sure if there is a stone in the Rt ureter as this may be a calcification of the ovarian vein.      Past Medical History:   Diagnosis Date    Arthritis     Arthritis     Atrophic kidney 2020    Bulging lumbar disc     lower back    Chronic back pain     Chronic headaches     Chronic kidney disease     Depression     Diabetes mellitus ([facility] [address]     Fibromyalgia     Fibromyalgia     Hypomagnesemia 2020    Osteoporosis with current pathological fracture with routine healing 2021    Recurrent major depressive disorder, in partial remission ([facility] [address] 3/5/2020     Past Surgical History:   Procedure Laterality Date     SECTION      [id number],    FINGER TRIGGER RELEASE Left 2020    TRIGGER RELEASE MIDDLE FINGER AND RING FINGER LEFT HAND performed by Lois Cronin MD at [address] [place] [address] Right 11/3/2020    RELEASE OF RIGHT RING FINGER TRIGGER FINGER performed by Jeremy Cosme MD at [facility]    kidney stone removal      Social History     Socioeconomic History    Marital status:      Spouse name: None    Number of children: None    Years of education: None    Highest education level: None   Occupational History    None   Tobacco Use    Smoking status: Current Every Day Smoker     Packs/day: 1.50     Years: 44.00     Pack years: 66.00     Types: Cigarettes     Start date: 5/1/1976    Smokeless tobacco: Never Used   Vaping Use    Vaping Use: Never used   Substance and Sexual Activity    Alcohol use: Yes     Comment: occasionally    Drug use: Never    Sexual activity: None   Other Topics Concern    None   Social History Narrative    None     Social Determinants of Health     Financial Resource Strain: Low Risk     Difficulty of Paying Living Expenses: Not very hard   Food Insecurity: No Food Insecurity    Worried About Running Out of Food in the Last Year: Never true    Hortencia of Food in the Last Year: Never true   Transportation Needs: No Transportation Needs    Lack of Transportation (Medical): No    Lack of Transportation (Non-Medical):  No   Physical Activity:     Days of Exercise per Week:     Minutes of Exercise per Session:    Stress:     Feeling of Stress :    Social Connections:     Frequency of Communication with Friends and Family:     Frequency of Social Gatherings with Friends and Family:     Attends Gnosticism Services:     Active Member of Clubs or Organizations:     Attends Club or Organization Meetings:     Marital Status:    Intimate Partner Violence:     Fear of Current or Ex-Partner:     Emotionally Abused:     Physically Abused:     Sexually Abused:      Family History   Problem Relation Age of Onset    Diabetes Mother     Arthritis Mother     Diabetes Father     Heart Attack Father     Arthritis Father     Heart Disease Father     Coronary Art Dis Father    [place] Stroke Father      Current Outpatient Medications   Medication Sig Dispense Refill    pregabalin (LYRICA) 100 MG capsule TAKE 1 CAPSULE BY MOUTH TWICE DAILY FOR 90 DAYS 180 capsule 0    esomeprazole (NEXIUM) 40 MG delayed release capsule TAKE 1 CAPSULE BY MOUTH ONCE DAILY 90 capsule 1    atenolol (TENORMIN) 25 MG tablet TAKE 1 TABLET BY MOUTH ONCE DAILY 90 tablet 1    vitamin B-12 (CYANOCOBALAMIN) 100 MCG tablet Take 50 mcg by mouth daily      cyclobenzaprine (FLEXERIL) 10 MG tablet TAKE 1 TABLET BY MOUTH NIGHTLY AS NEEDED FOR MUSCLE SPASM 30 tablet 5    BASAGLAR KWIKPEN 100 UNIT/ML injection pen INJECT 60 UNITS SUBCUTANEOUSLY ONCE DAILY E11.65   Patient need 3 boxes. 14 pen 3    DULoxetine (CYMBALTA) 60 MG extended release capsule Take 1 capsule by mouth twice daily 180 capsule 1    metFORMIN (GLUCOPHAGE) 1000 MG tablet TAKE 1 TABLET BY MOUTH TWICE DAILY WITH MEALS 180 tablet 1    Iron, Ferrous Sulfate, 325 (65 Fe) MG TABS Take by mouth      Magnesium 400 MG CAPS Take 1 capsule by mouth 2 times daily 90 capsule 4    Turmeric 500 MG CAPS Take by mouth 2 times daily      BD PEN NEEDLE STEFFANIE U/F 32G X 4 MM MISC USE 1 TO INJECT INSULIN 4 TIMES DAILY 100 each 12    TRUE METRIX BLOOD GLUCOSE TEST strip USE 1 STRIP TO CHECK GLUCOSE 4 TIMES DAILY 100 each 12    hydrOXYzine (VISTARIL) 25 MG capsule hydroxyzine pamoate 25 mg capsule BID PRN anxiety 90 capsule 0    acetaminophen (TYLENOL) 500 MG tablet Take 2 tablets by mouth 3 times daily 180 tablet 0    TRUEplus Lancets 33G MISC USE 1 TO CHECK GLUCOSE 4 TIMES DAILY      Blood Glucose Monitoring Suppl (TRUE METRIX AIR GLUCOSE METER) w/Device KIT USE TO CHECK GLUCOSE 4 TIMES DAILY      NOVOLOG FLEXPEN 100 UNIT/ML injection pen INJECT 8-12  UNITS SUBCUTANEOUSLY THREE TIMES DAILY BEFORE MEAL(S) 15 pen 1     No current facility-administered medications for this visit.      Bactrim [sulfamethoxazole-trimethoprim], Nsaids, Oxycodone, Statins, and Sulfa antibiotics  reviewed      Review of Systems   Constitutional: Negative. HENT: Negative. Eyes: Positive for visual disturbance. Respiratory: Negative. Negative for shortness of breath. Cardiovascular: Negative. Negative for chest pain. Genitourinary: Negative for decreased urine volume, difficulty urinating, dysuria, enuresis, flank pain, frequency, genital sores, hematuria, pelvic pain and urgency. Musculoskeletal: Positive for back pain and gait problem. Neurological: Positive for weakness. Hematological: Negative. Psychiatric/Behavioral: Positive for dysphoric mood. Objective:   Physical Exam  Constitutional:       Appearance: Normal appearance. HENT:      Head: Normocephalic and atraumatic. Eyes:      Conjunctiva/sclera: Conjunctivae normal.   Cardiovascular:      Rate and Rhythm: Normal rate and regular rhythm. Heart sounds: Normal heart sounds. No murmur heard. Pulmonary:      Effort: Pulmonary effort is normal. No respiratory distress. Breath sounds: Normal breath sounds. No stridor. No wheezing, rhonchi or rales. Abdominal:      General: Abdomen is flat. Bowel sounds are normal. There is no distension. Palpations: Abdomen is soft. There is no mass. Tenderness: There is no abdominal tenderness. There is no right CVA tenderness, left CVA tenderness, guarding or rebound. Hernia: No hernia is present. Musculoskeletal:      Cervical back: Neck supple. No tenderness. Neurological:      Mental Status: She is alert.    Psychiatric:         Mood and Affect: Mood normal.         Behavior: Behavior normal.            CT ABDOMEN PELVIS W WO CONTRAST Additional Contrast? Radiologist Recommendation  Status: Final result   Order Providers    Authorizing Encounter Billing   Eric aVughan  [address] Andry Lawton MD          Signed by    Signed Date/Time Phone Pager   Mariia Craig 9/16/2021  7:02 [phone]    Reading Providers    Read Date Phone Pager   Cheyenne Quintana Sep 16, 2021  7:02 [phone]    All Reviewers List    Fernie Resendez MD on 9/20/2021 16:55   Fernie Resendez MD on 9/20/2021 16:55   CT ABDOMEN PELVIS W WO CONTRAST Additional Contrast? Radiologist Recommendation: Result Notes   Brian Birch, [address] [place]   9/16/2021  3:29 PM EDT       Scheduled office visit 9/27 @ 11:30    Roxanna Jules MD   9/16/2021  1:10 PM EDT       Please call patient. CT does not show any cancer. It does show some slightly enlarged lymphnodes. Please schedule DOXY or OV  in next two weeks to discuss next steps          Routing History    Priority Sent On From To Message Type    9/16/2021  3:29 PM BERENICE Turner MD Result Notes    9/16/2021  1:10 PM Fernie Resendez MD [facility] EM[facility] AT [facility] Clinical Staff Result Notes    9/16/2021 10:08 AM Michel, [facility] Incoming Radiant Results From Josie Cortez MD Results    9/15/2021  7:42 AM Michel, [facility] Incoming Lab Results From Soft Fernie Resendez MD CC'd Results   Radiation Dose Estimates    No radiation information found for this patient   Narrative   EXAMINATION: CT ABDOMEN PELVIS W WO CONTRAST        DATE AND TIME:9/14/2021 1:12 PM       CLINICAL HISTORY: POSSIBLE LYMPHADENOPATHY  R93.5 ABNORMAL ULTRASOUND OF ABDOMEN ICD10        COMPARISON: NONE AVAILABLE.       TECHNIQUE: Contiguous axial CT sections of the abdomen and pelvis.  100 cc's of IV contrast given. Karly Pandora CT scans at this facility use dose modulation, iterative reconstruction, and/or weight based dosing when appropriate to reduce radiation dose to as    low as reasonably achievable.  Some of this report was completed using  Power Scribe [id number] and may include unintended errors with respect to translation of words, typographical errors or grammatical errors which may not have    been Unknown   100MG    Urobilinogen, UA 10/11/2021  3:43 PM Unknown   0.2    Leukocytes, UA 10/11/2021  3:43 PM Unknown   LARGE    Nitrite, UA 10/11/2021  3:43 PM Unknown   NEG        Assessment: This is a 63 yo female with h/o CBP, DM (HbA1c 9.5), Fibromyalgia, OA, Depression, CRI, and has Lt renal atrophy on recent CT that is of likely chronic nature. There is a  LP renal parenchymal calcification that is not causing any symptoms or pain. I recommend no active treatment for the Lt renal findings. There is also a possible Rt distal ureteral stone vs vein calcification and she has no Rt sided symptoms. I recommend she starts expulsive therapy with Uroxatral (Sulfa allergy) and proper dosing and SE were reviewed. Will F/U in 2 weeks for repeat CT and she agrees. She can not get IV dye because of elevated Creat. Plan:      1. F/U 2 weeks for Stone CT  2.    Orders Placed This Encounter   Medications    alfuzosin (UROXATRAL) 10 MG extended release tablet     Sig: Take 1 tablet by mouth daily     Dispense:  30 tablet     Refill:  0             Romina Camejo MD

## 2021-11-04 ENCOUNTER — PATIENT MESSAGE (OUTPATIENT)
Dept: FAMILY MEDICINE CLINIC | Age: 61
End: 2021-11-04

## 2021-11-11 ENCOUNTER — HOSPITAL ENCOUNTER (OUTPATIENT)
Dept: CT IMAGING | Age: 61
Discharge: HOME | End: 2021-11-13
Payer: MEDICARE

## 2021-11-11 DIAGNOSIS — N20.0 KIDNEY STONE: ICD-10-CM

## 2021-11-11 PROCEDURE — 74176 CT ABD & PELVIS W/O CONTRAST: CPT

## 2021-11-19 DIAGNOSIS — Z79.4 TYPE 2 DIABETES MELLITUS WITH OTHER SPECIFIED COMPLICATION, WITH LONG-TERM CURRENT USE OF INSULIN (HCC): ICD-10-CM

## 2021-11-19 DIAGNOSIS — E11.69 TYPE 2 DIABETES MELLITUS WITH OTHER SPECIFIED COMPLICATION, WITH LONG-TERM CURRENT USE OF INSULIN (HCC): ICD-10-CM

## 2021-11-23 RX ORDER — INSULIN GLARGINE 100 [IU]/ML
INJECTION, SOLUTION SUBCUTANEOUS
Qty: 14 PEN | Refills: 3 | Status: SHIPPED | OUTPATIENT
Start: 2021-11-23 | End: 2022-01-02 | Stop reason: SDUPTHER

## 2021-12-13 ENCOUNTER — OFFICE VISIT (OUTPATIENT)
Dept: FAMILY MEDICINE CLINIC | Age: 61
End: 2021-12-13
Payer: MEDICARE

## 2021-12-13 VITALS
WEIGHT: 195 LBS | OXYGEN SATURATION: 96 % | HEIGHT: 61 IN | RESPIRATION RATE: 16 BRPM | SYSTOLIC BLOOD PRESSURE: 138 MMHG | BODY MASS INDEX: 36.82 KG/M2 | TEMPERATURE: 96.7 F | DIASTOLIC BLOOD PRESSURE: 90 MMHG | HEART RATE: 76 BPM

## 2021-12-13 DIAGNOSIS — R59.0 LYMPHADENOPATHY, ABDOMINAL: ICD-10-CM

## 2021-12-13 DIAGNOSIS — R76.0 ABNORMAL ANTINUCLEAR ANTIBODY TITER: ICD-10-CM

## 2021-12-13 DIAGNOSIS — R19.5 POSITIVE COLORECTAL CANCER SCREENING USING COLOGUARD TEST: ICD-10-CM

## 2021-12-13 DIAGNOSIS — N26.1 ATROPHIC KIDNEY: ICD-10-CM

## 2021-12-13 DIAGNOSIS — R79.89 ABNORMAL C-REACTIVE PROTEIN: ICD-10-CM

## 2021-12-13 DIAGNOSIS — R59.0 RETROPERITONEAL LYMPHADENOPATHY: ICD-10-CM

## 2021-12-13 DIAGNOSIS — E55.9 VITAMIN D DEFICIENCY: ICD-10-CM

## 2021-12-13 DIAGNOSIS — K74.4 SECONDARY BILIARY CIRRHOSIS (HCC): ICD-10-CM

## 2021-12-13 DIAGNOSIS — N13.30 HYDROURETERONEPHROSIS: Chronic | ICD-10-CM

## 2021-12-13 DIAGNOSIS — Z91.199 NONADHERENCE TO MEDICAL TREATMENT: Chronic | ICD-10-CM

## 2021-12-13 DIAGNOSIS — N18.32 STAGE 3B CHRONIC KIDNEY DISEASE (HCC): ICD-10-CM

## 2021-12-13 DIAGNOSIS — Z79.4 TYPE 2 DIABETES MELLITUS WITH DIABETIC NEUROPATHY, WITH LONG-TERM CURRENT USE OF INSULIN (HCC): Primary | ICD-10-CM

## 2021-12-13 DIAGNOSIS — R76.8 POSITIVE ANA (ANTINUCLEAR ANTIBODY): ICD-10-CM

## 2021-12-13 DIAGNOSIS — E11.40 TYPE 2 DIABETES MELLITUS WITH DIABETIC NEUROPATHY, WITH LONG-TERM CURRENT USE OF INSULIN (HCC): Primary | ICD-10-CM

## 2021-12-13 DIAGNOSIS — G47.33 OSA (OBSTRUCTIVE SLEEP APNEA): ICD-10-CM

## 2021-12-13 DIAGNOSIS — R53.82 CHRONIC FATIGUE: ICD-10-CM

## 2021-12-13 PROCEDURE — 99215 OFFICE O/P EST HI 40 MIN: CPT | Performed by: FAMILY MEDICINE

## 2021-12-13 NOTE — PROGRESS NOTES
Subjective  Roselyn Yap, 62 y.o. female presents today with:  Chief Complaint   Patient presents with    Diabetes     follow up     Fatigue     3-4 hours a day taking naps            HPI    09/27/2021: Complains of excessive sleep. Always tired. Is always taking naps. Sleep study in 2018 revealed patient did not enter REM sleep. Takes 3 naps a day in spite of frequent large doses of caffeine. She does snore. She does gasp awake. Apneic episodes do occur. Has nonrefreshing sleep. Occasionally awakens with headaches. Awakens every 2-3 hours during the night. Has lucid dreams. No apparent sleep paralysis. Wants med to make her stay awake.     Microcytosis with positive cologuard. Has been taking iron pills and B12.      Significant finding on recent CT scan. See below.     12/113/2021: Accompanied by daughter, [address], who states that she doesn't check CBG or give her insulin like she should be. Notes persistent sleepiness. Has worsening renal obstruction. She keeps not going to her appointments. She has been referred to rheumatology for multiple joint pain, biliary cirrhosis, positive SOPHIA, positive rheumatoid factor, elevated C-reactive protein. She is also been referred to GI for evaluation of positive Cologuard. She has canceled that appointment multiple times.     No other questions and or concerns for today's visit      Review of Systems      Past Medical History:   Diagnosis Date    Arthritis     Arthritis     Atrophic kidney 12/23/2020    Bulging lumbar disc     lower back    Chronic back pain     Chronic headaches     Chronic kidney disease     Depression     Diabetes mellitus ([address]     Fibromyalgia     Fibromyalgia     Hydroureteronephrosis - right, obstructive, worsening 12/20/2021    Hypomagnesemia 11/9/2020    Nonadherence to medical treatment 12/20/2021    Osteoporosis with current pathological fracture with routine healing 7/20/2021    Recurrent major depressive disorder, in partial remission ([facility] 3/5/2020     Past Surgical History:   Procedure Laterality Date     SECTION      [id number],    FINGER TRIGGER RELEASE Left 2020    TRIGGER RELEASE MIDDLE FINGER AND RING FINGER LEFT HAND performed by Siobhan Lee MD at [address] [place] [address] Right 11/3/2020    RELEASE OF RIGHT RING FINGER TRIGGER FINGER performed by Siobhan Lee MD at [facility]    kidney stone removal      Social History     Socioeconomic History    Marital status:      Spouse name: Not on file    Number of children: Not on file    Years of education: Not on file    Highest education level: Not on file   Occupational History    Not on file   Tobacco Use    Smoking status: Current Every Day Smoker     Packs/day: 1.50     Years: 44.00     Pack years: 66.00     Types: Cigarettes     Start date: 1976    Smokeless tobacco: Never Used   Vaping Use    Vaping Use: Never used   Substance and Sexual Activity    Alcohol use: Yes     Comment: occasionally    Drug use: Never    Sexual activity: Not on file   Other Topics Concern    Not on file   Social History Narrative    Not on file     Social Determinants of Health     Financial Resource Strain: Low Risk     Difficulty of Paying Living Expenses: Not very hard   Food Insecurity: No Food Insecurity    Worried About Running Out of Food in the Last Year: Never true    Hortencia of Food in the Last Year: Never true   Transportation Needs: No Transportation Needs    Lack of Transportation (Medical): No    Lack of Transportation (Non-Medical):  No   Physical Activity:     Days of Exercise per Week: Not on file    Minutes of Exercise per Session: Not on file   Stress:     Feeling of Stress : Not on file   Social Connections:     Frequency of Communication with Friends and Family: Not on file    Frequency of Social Gatherings with Friends and Family: Not on file    Attends [employer] Services: Not on file    Active Member of Clubs or Organizations: Not on file    Attends Club or Organization Meetings: Not on file    Marital Status: Not on file   Intimate Partner Violence:     Fear of Current or Ex-Partner: Not on file    Emotionally Abused: Not on file    Physically Abused: Not on file    Sexually Abused: Not on file   Housing Stability:     Unable to Pay for Housing in the Last Year: Not on file    Number of [place] in the Last Year: Not on file    Unstable Housing in the Last Year: Not on file     Family History   Problem Relation Age of Onset    Diabetes Mother    [employer] Arthritis Mother     Diabetes Father     Heart Attack Father     Arthritis Father     Heart Disease Father     Coronary Art Dis Father     Stroke Father      Allergies   Allergen Reactions    Bactrim [Sulfamethoxazole-Trimethoprim]     Nsaids Other (See Comments)     Kidney disease    Oxycodone      Patient reports she \"was addicted to it in the past\".  Statins     Sulfa Antibiotics      Current Outpatient Medications   Medication Sig Dispense Refill    BASAGLAR KWIKPEN 100 UNIT/ML injection pen INJECT 60 UNITS SUBCUTANEOUSLY ONCE DAILY E11.65   Patient need 3 boxes.  14 pen 3    metFORMIN (GLUCOPHAGE) 1000 MG tablet TAKE 1 TABLET BY MOUTH TWICE DAILY WITH MEALS 180 tablet 1    alfuzosin (UROXATRAL) 10 MG extended release tablet Take 1 tablet by mouth daily 30 tablet 0    pregabalin (LYRICA) 100 MG capsule TAKE 1 CAPSULE BY MOUTH TWICE DAILY FOR 90 DAYS 180 capsule 0    esomeprazole (NEXIUM) 40 MG delayed release capsule TAKE 1 CAPSULE BY MOUTH ONCE DAILY 90 capsule 1    atenolol (TENORMIN) 25 MG tablet TAKE 1 TABLET BY MOUTH ONCE DAILY 90 tablet 1    cyclobenzaprine (FLEXERIL) 10 MG tablet TAKE 1 TABLET BY MOUTH NIGHTLY AS NEEDED FOR MUSCLE SPASM 30 tablet 5    DULoxetine (CYMBALTA) 60 MG extended release capsule Take 1 capsule by mouth twice daily 180 capsule 1    Iron, Ferrous Sulfate, 325 (65 Fe) MG TABS Take by mouth      Magnesium 400 MG CAPS Take 1 capsule by mouth 2 times daily 90 capsule 4    Turmeric 500 MG CAPS Take by mouth 2 times daily      BD PEN NEEDLE STEFFANIE U/F 32G X 4 MM MISC USE 1 TO INJECT INSULIN 4 TIMES DAILY 100 each 12    TRUE METRIX BLOOD GLUCOSE TEST strip USE 1 STRIP TO CHECK GLUCOSE 4 TIMES DAILY 100 each 12    hydrOXYzine (VISTARIL) 25 MG capsule hydroxyzine pamoate 25 mg capsule BID PRN anxiety 90 capsule 0    acetaminophen (TYLENOL) 500 MG tablet Take 2 tablets by mouth 3 times daily 180 tablet 0    TRUEplus Lancets 33G MISC USE 1 TO CHECK GLUCOSE 4 TIMES DAILY      Blood Glucose Monitoring Suppl (TRUE METRIX AIR GLUCOSE METER) w/Device KIT USE TO CHECK GLUCOSE 4 TIMES DAILY      vitamin B-12 (CYANOCOBALAMIN) 100 MCG tablet Take 50 mcg by mouth daily (Patient not taking: Reported on 12/13/2021)      NOVOLOG FLEXPEN 100 UNIT/ML injection pen INJECT 8-12  UNITS SUBCUTANEOUSLY THREE TIMES DAILY BEFORE MEAL(S) (Patient not taking: Reported on 12/13/2021) 15 pen 1     No current facility-administered medications for this visit. PMH, Surgical Hx, Family Hx, and Social Hxreviewed and updated. Health Maintenance reviewed. Objective    Vitals:    12/13/21 1239   BP: (!) 138/90   Pulse: 76   Resp: 16   Temp: 96.7 °F (35.9 °C)   TempSrc: Temporal   SpO2: 96%   Weight: 195 lb (88.5 kg)   Height: 5' 1\" (1.549 m)        Physical Exam  Constitutional:       General: She is not in acute distress. Appearance: She is well-developed. HENT:      Head: Normocephalic and atraumatic. Eyes:      General: No scleral icterus. Conjunctiva/sclera: Conjunctivae normal.   Cardiovascular:      Rate and Rhythm: Normal rate and regular rhythm. Heart sounds: Normal heart sounds. Pulmonary:      Effort: No respiratory distress. Breath sounds: No wheezing or rales. Comments: Diffusely, moderately diminished breath sounds  Skin:     General: Skin is warm and dry. Neurological:      Mental Status: She is alert and oriented to person, place, and time. Psychiatric:         Mood and Affect: Mood and affect normal.         Speech: Speech normal.         Behavior: Behavior is cooperative. Thought Content: Thought content normal.         Cognition and Memory: Cognition normal.         Judgment: Judgment is inappropriate. Lab Results   Component Value Date    LABA1C 9.5 (H) 10/04/2021    LABA1C 8.7 07/07/2021    LABA1C 8.1 (H) 03/05/2021     Lab Results   Component Value Date    LABMICR 8.30 (H) 10/16/2020    CREATININE 1.53 (H) 10/04/2021     Lab Results   Component Value Date    ALT 35 (H) 05/01/2020    AST 26 05/01/2020     Lab Results   Component Value Date    CHOL 187 10/16/2020    TRIG 151 (H) 10/16/2020    HDL 32 (L) 10/16/2020    LDLCALC 125 10/16/2020      DEXA BONE DENSITY AXIAL SKELETON  Narrative: DEXA BONE DENSITY AXIAL SKELETON : 12/16/2021 12:46 PM    CLINICAL HISTORY: M80.00XD Osteoporosis with current pathological fracture with routine healing, unspecified osteoporosis type, subsequent encounter ICD8  62year-old postmenopausal patient    COMPARISON: None available. TECHNIQUE: DEXA bone density measurements were obtained of the lumbar spine and proximal femurs using a GE lunar Prodigy advance system    FINDINGS:     The mean bone mineral density from L1 through L4 is 0.948 g/cm2, and the T-score is -1.9, which is  1.9 standard deviations below the standard reference value for young adult. Bone mineral density of the left femoral neck is 0.739 g/cm2, and the T-score is -2.2 , which is  2.2 standard deviations below the standard reference value for young adult. Bone mineral density of the right femoral neck is 0.710 g/cm2,  and the T-score is -2.4,  which is  2.4 standard deviations the standard reference value for young adult. These values meet WHO criteria for osteopenia.     10 year probability (FRAX) of major osteoporotic fracture based on the RIGHT femoral neck bone mineral density is: 18.3%, and hip fracture 5.0%. Impression: OSTEOPENIA. Assessment & Plan   Visit Diagnoses and Associated Orders     Type 2 diabetes mellitus with diabetic neuropathy, with long-term current use of insulin ([address]    -  Primary    Worse, poor control due to dietary and medical nonadherence. Urged improved diet and given printed materials. Urged med adherence. Comprehensive Metabolic Panel [[id number] Custom]   - Future Order         Secondary biliary cirrhosis (HCC)        Stable, fair control possibly due to [place]. However has lab abnormalities which may reflect autoimmune disease. Referred to rheumatology. Comprehensive Metabolic Panel [[id number] Custom]   - Future Order    C-Reactive Protein [[id number] Custom]   - Future Order    Rheumatoid Factor [[id number] Custom]   - Future Order    Sedimentation Rate [[id number] Custom]   - Future Order    SOPHIA Screen with Reflex [[id number] Custom]   - Future Order    Amb External Referral To Rheumatology [[id number] Custom]           Stage 3b chronic kidney disease (HCC)        Stable, fair control. Advised against all NSAIDs. Consider avoiding diuretics. Follow closely. Comprehensive Metabolic Panel [[id number] Custom]   - Future Order         Atrophic kidney        Unclear etiology. Last seen by Dr. Christina Rodriguez in October. 2 appointments have been canceled. None rescheduled. Referred back. Comprehensive Metabolic Panel [[id number] Custom]   - Future Dmitri Upton MD, Urology, [facility] Custom]           Retroperitoneal lymphadenopathy        Unclear etiology. Last seen by Dr. Christina Rodriguez in October. 2 appointments have been canceled. None rescheduled. Referred back. Cynthia Jacome MD, Urology, [facility] Custom]           Hydroureteronephrosis        Unclear etiology. Last seen by Dr. Christina Rodriguez in October. 2 appointments have been canceled. None rescheduled. Referred back.     [place] cancellations. Daughter now aware of concerns and may assist if inclined. Time spent on appointment included reviewing past laboratory and radiographic results, previous notes, consultations, past procedures, medications, obtaining history and performing physical, formulating mutually agreed upon plan of care with patient 55 minutes. Reviewed with the patient: all disease processes, current clinical status, medications, activities and diet.      Side effects, adverse effects of the medication prescribed today, as well as treatment plan/ rationale and result expectations have been discussed with the patient who expresses understanding and desires to proceed.     Close follow up to evaluate treatment results and for coordination of care. I have reviewed the patient's medical history in detail and updated the computerized patient record. More than 50% of the appointment was spent in face-to-face counseling, education and care coordination. Please note this report has been partially produced using speech recognition software and may contain mistakes related to that system including errors in grammar, punctuation and spelling as well as words and phrases that may seem inappropriate. If there are questions or concerns, please feel free to contact me to clarify.        Orders Placed This Encounter   Procedures    CBC With Auto Differential     Standing Status:   Future     Standing Expiration Date:   12/13/2022    Comprehensive Metabolic Panel     Standing Status:   Future     Standing Expiration Date:   3/13/2022    Vitamin B12 & Folate     Standing Status:   Future     Standing Expiration Date:   12/13/2022    Vitamin D 25 Hydroxy     Standing Status:   Future     Standing Expiration Date:   12/13/2022    C-Reactive Protein     Standing Status:   Future     Standing Expiration Date:   12/13/2022    Rheumatoid Factor     Standing Status:   Future     Standing Expiration Date:   12/13/2022  Sedimentation Rate     Standing Status:   Future     Standing Expiration Date:   12/13/2022    SOPHIA Screen with Reflex     Standing Status:   Future     Standing Expiration Date:   12/13/2022    Amb External Referral To Rheumatology     Referral Priority:   Routine     Referral Type:   Eval and Treat     Referral Reason:   Specialty Services Required     Requested Specialty:   Rheumatology     Number of Visits Requested:   1   Riki Villegas MD, Urology, [facility]     Referral Priority:   Routine     Referral Type:   Eval and Treat     Referral Reason:   Specialty Services Required     Referred to Provider:   Carly Ward MD     Requested Specialty:   Urology     Number of Visits Requested:   1     No orders of the defined types were placed in this encounter. There are no discontinued medications. Return in about 4 weeks (around 1/10/2022) for multiple - OV. Controlled Substance Monitoring:    Acute and Chronic Pain Monitoring:   No flowsheet data found.         Prudencio Pritchett MD

## 2021-12-16 ENCOUNTER — HOSPITAL ENCOUNTER (OUTPATIENT)
Dept: WOMENS IMAGING | Age: 61
Discharge: HOME | End: 2021-12-18
Payer: MEDICARE

## 2021-12-16 VITALS — HEIGHT: 61 IN | BODY MASS INDEX: 36.84 KG/M2

## 2021-12-16 DIAGNOSIS — R93.7 ABNORMAL FINDINGS ON DIAGNOSTIC IMAGING OF OTHER PARTS OF MUSCULOSKELETAL SYSTEM: ICD-10-CM

## 2021-12-16 DIAGNOSIS — M80.00XD OSTEOPOROSIS WITH CURRENT PATHOLOGICAL FRACTURE WITH ROUTINE HEALING, UNSPECIFIED OSTEOPOROSIS TYPE, SUBSEQUENT ENCOUNTER: Chronic | ICD-10-CM

## 2021-12-16 DIAGNOSIS — Z12.31 ENCOUNTER FOR SCREENING MAMMOGRAM FOR BREAST CANCER: ICD-10-CM

## 2021-12-16 PROCEDURE — 77080 DXA BONE DENSITY AXIAL: CPT

## 2021-12-16 PROCEDURE — 77067 SCR MAMMO BI INCL CAD: CPT

## 2021-12-16 NOTE — RESULT ENCOUNTER NOTE
Please call patient. Her bone density test does show thinning bones. We will review what we are going to do about it at her next appt.

## 2021-12-20 PROBLEM — N13.30 HYDROURETERONEPHROSIS: Chronic | Status: ACTIVE | Noted: 2021-12-20

## 2021-12-20 PROBLEM — Z91.199 NONADHERENCE TO MEDICAL TREATMENT: Chronic | Status: ACTIVE | Noted: 2021-12-20

## 2022-01-02 DIAGNOSIS — Z79.4 TYPE 2 DIABETES MELLITUS WITH OTHER SPECIFIED COMPLICATION, WITH LONG-TERM CURRENT USE OF INSULIN (HCC): ICD-10-CM

## 2022-01-02 DIAGNOSIS — E11.69 TYPE 2 DIABETES MELLITUS WITH OTHER SPECIFIED COMPLICATION, WITH LONG-TERM CURRENT USE OF INSULIN (HCC): ICD-10-CM

## 2022-01-03 RX ORDER — INSULIN GLARGINE 100 [IU]/ML
INJECTION, SOLUTION SUBCUTANEOUS
Qty: 14 PEN | Refills: 3 | Status: SHIPPED | OUTPATIENT
Start: 2022-01-03

## 2022-02-22 DIAGNOSIS — E11.40 TYPE 2 DIABETES MELLITUS WITH DIABETIC NEUROPATHY, WITH LONG-TERM CURRENT USE OF INSULIN (HCC): Chronic | ICD-10-CM

## 2022-02-22 DIAGNOSIS — M79.7 FIBROMYALGIA: Chronic | ICD-10-CM

## 2022-02-22 DIAGNOSIS — Z79.4 TYPE 2 DIABETES MELLITUS WITH DIABETIC NEUROPATHY, WITH LONG-TERM CURRENT USE OF INSULIN (HCC): Chronic | ICD-10-CM

## 2022-02-22 DIAGNOSIS — F33.41 RECURRENT MAJOR DEPRESSIVE DISORDER, IN PARTIAL REMISSION (HCC): Chronic | ICD-10-CM

## 2022-02-22 NOTE — TELEPHONE ENCOUNTER
Future Appointments    Encounter Information    Provider Department Appt Notes   9/28/2022 Radha Shaw, [facility] MAW       Past Visits    Date Provider Specialty Visit Type Primary Dx   12/13/2021 Radha Shaw MD Family Medicine Office Visit Type 2 diabetes mellitus with diabetic neuropathy, with long-term current use of insulin [facility]

## 2022-02-23 RX ORDER — PREGABALIN 100 MG/1
CAPSULE ORAL
Qty: 60 CAPSULE | Refills: 0 | OUTPATIENT
Start: 2022-02-23 | End: 2022-05-18

## 2022-02-23 RX ORDER — ATENOLOL 25 MG/1
TABLET ORAL
Qty: 30 TABLET | Refills: 0 | OUTPATIENT
Start: 2022-02-23

## 2022-02-23 RX ORDER — ESOMEPRAZOLE MAGNESIUM 40 MG/1
CAPSULE, DELAYED RELEASE ORAL
Qty: 30 CAPSULE | Refills: 0 | OUTPATIENT
Start: 2022-02-23

## 2022-02-23 RX ORDER — HYDROXYZINE PAMOATE 25 MG/1
CAPSULE ORAL
Qty: 90 CAPSULE | Refills: 0 | OUTPATIENT
Start: 2022-02-23

## 2022-02-23 RX ORDER — DULOXETIN HYDROCHLORIDE 60 MG/1
CAPSULE, DELAYED RELEASE ORAL
Qty: 180 CAPSULE | Refills: 1 | OUTPATIENT
Start: 2022-02-23

## 2022-02-23 NOTE — TELEPHONE ENCOUNTER
Christina Denis was supposed to follow-up with me in January. Please schedule an appointment for as soon as possible then resubmit Rx requests.

## 2022-08-10 ENCOUNTER — TELEPHONE (OUTPATIENT)
Dept: FAMILY MEDICINE CLINIC | Age: 62
End: 2022-08-10

## 2023-10-29 ENCOUNTER — HOSPITAL ENCOUNTER (EMERGENCY)
Age: 63
Discharge: HOME | End: 2023-10-29
Payer: MEDICARE

## 2023-10-29 ENCOUNTER — APPOINTMENT (OUTPATIENT)
Dept: CT IMAGING | Age: 63
End: 2023-10-29
Payer: MEDICARE

## 2023-10-29 VITALS
HEART RATE: 70 BPM | RESPIRATION RATE: 17 BRPM | DIASTOLIC BLOOD PRESSURE: 78 MMHG | OXYGEN SATURATION: 95 % | TEMPERATURE: 98.5 F | SYSTOLIC BLOOD PRESSURE: 120 MMHG

## 2023-10-29 DIAGNOSIS — N30.01 ACUTE CYSTITIS WITH HEMATURIA: Primary | ICD-10-CM

## 2023-10-29 DIAGNOSIS — R10.9 RIGHT FLANK PAIN: ICD-10-CM

## 2023-10-29 LAB
ALBUMIN SERPL-MCNC: 3.7 G/DL (ref 3.5–4.6)
ALP SERPL-CCNC: 65 U/L (ref 40–130)
ALT SERPL-CCNC: 21 U/L (ref 0–33)
ANION GAP SERPL CALCULATED.3IONS-SCNC: 15 MEQ/L (ref 9–15)
AST SERPL-CCNC: 18 U/L (ref 0–35)
BACTERIA URNS QL MICRO: ABNORMAL /HPF
BASOPHILS # BLD: 0 K/UL (ref 0–0.2)
BASOPHILS NFR BLD: 0.4 %
BILIRUB SERPL-MCNC: <0.2 MG/DL (ref 0.2–0.7)
BILIRUB UR QL STRIP: NEGATIVE
BUN SERPL-MCNC: 19 MG/DL (ref 8–23)
CALCIUM SERPL-MCNC: 9.5 MG/DL (ref 8.5–9.9)
CHLORIDE SERPL-SCNC: 102 MEQ/L (ref 95–107)
CLARITY UR: ABNORMAL
CO2 SERPL-SCNC: 22 MEQ/L (ref 20–31)
COLOR UR: YELLOW
CREAT SERPL-MCNC: 1.35 MG/DL (ref 0.5–0.9)
CRYSTALS URNS MICRO: ABNORMAL /HPF
EOSINOPHIL # BLD: 0.3 K/UL (ref 0–0.7)
EOSINOPHIL NFR BLD: 2.4 %
EPI CELLS #/AREA URNS AUTO: ABNORMAL /HPF (ref 0–5)
ERYTHROCYTE [DISTWIDTH] IN BLOOD BY AUTOMATED COUNT: 15.9 % (ref 11.5–14.5)
GLOBULIN SER CALC-MCNC: 3.6 G/DL (ref 2.3–3.5)
GLUCOSE SERPL-MCNC: 220 MG/DL (ref 70–99)
GLUCOSE UR STRIP-MCNC: NEGATIVE MG/DL
HCT VFR BLD AUTO: 46.2 % (ref 37–47)
HGB BLD-MCNC: 14.4 G/DL (ref 12–16)
HGB UR QL STRIP: ABNORMAL
HYALINE CASTS #/AREA URNS AUTO: ABNORMAL /HPF (ref 0–5)
KETONES UR STRIP-MCNC: NEGATIVE MG/DL
LACTATE BLDV-SCNC: 2.9 MMOL/L (ref 0.5–2.2)
LEUKOCYTE ESTERASE UR QL STRIP: ABNORMAL
LIPASE SERPL-CCNC: 43 U/L (ref 12–95)
LYMPHOCYTES # BLD: 1.3 K/UL (ref 1–4.8)
LYMPHOCYTES NFR BLD: 12.7 %
MCH RBC QN AUTO: 26.4 PG (ref 27–31.3)
MCHC RBC AUTO-ENTMCNC: 31.2 % (ref 33–37)
MCV RBC AUTO: 84.6 FL (ref 79.4–94.8)
MONOCYTES # BLD: 0.7 K/UL (ref 0.2–0.8)
MONOCYTES NFR BLD: 7.2 %
NEUTROPHILS # BLD: 7.9 K/UL (ref 1.4–6.5)
NEUTS SEG NFR BLD: 76.9 %
NITRITE UR QL STRIP: NEGATIVE
PH UR STRIP: 5.5 [PH] (ref 5–9)
PLATELET # BLD AUTO: 330 K/UL (ref 130–400)
POTASSIUM SERPL-SCNC: 3.7 MEQ/L (ref 3.4–4.9)
PROT SERPL-MCNC: 7.3 G/DL (ref 6.3–8)
PROT UR STRIP-MCNC: >=300 MG/DL
RBC # BLD AUTO: 5.46 M/UL (ref 4.2–5.4)
RBC #/AREA URNS AUTO: ABNORMAL /HPF (ref 0–5)
SODIUM SERPL-SCNC: 139 MEQ/L (ref 135–144)
SP GR UR STRIP: 1.02 (ref 1–1.03)
URINE REFLEX TO CULTURE: YES
UROBILINOGEN UR STRIP-ACNC: 0.2 E.U./DL
WBC # BLD AUTO: 10.3 K/UL (ref 4.8–10.8)
WBC #/AREA URNS AUTO: >100 /HPF (ref 0–5)

## 2023-10-29 PROCEDURE — 85025 COMPLETE CBC W/AUTO DIFF WBC: CPT

## 2023-10-29 PROCEDURE — 80053 COMPREHEN METABOLIC PANEL: CPT

## 2023-10-29 PROCEDURE — 83605 ASSAY OF LACTIC ACID: CPT

## 2023-10-29 PROCEDURE — 83690 ASSAY OF LIPASE: CPT

## 2023-10-29 PROCEDURE — 96375 TX/PRO/DX INJ NEW DRUG ADDON: CPT

## 2023-10-29 PROCEDURE — 2580000003 HC RX 258: Performed by: PHYSICIAN ASSISTANT

## 2023-10-29 PROCEDURE — 6360000002 HC RX W HCPCS: Performed by: PHYSICIAN ASSISTANT

## 2023-10-29 PROCEDURE — 96365 THER/PROPH/DIAG IV INF INIT: CPT

## 2023-10-29 PROCEDURE — 99284 EMERGENCY DEPT VISIT MOD MDM: CPT

## 2023-10-29 PROCEDURE — 74176 CT ABD & PELVIS W/O CONTRAST: CPT

## 2023-10-29 PROCEDURE — 36415 COLL VENOUS BLD VENIPUNCTURE: CPT

## 2023-10-29 PROCEDURE — 6370000000 HC RX 637 (ALT 250 FOR IP): Performed by: PHYSICIAN ASSISTANT

## 2023-10-29 PROCEDURE — 81001 URINALYSIS AUTO W/SCOPE: CPT

## 2023-10-29 RX ORDER — PHENAZOPYRIDINE HYDROCHLORIDE 100 MG/1
100 TABLET, FILM COATED ORAL 3 TIMES DAILY PRN
Qty: 9 TABLET | Refills: 0 | Status: SHIPPED | OUTPATIENT
Start: 2023-10-29 | End: 2023-11-01

## 2023-10-29 RX ORDER — 0.9 % SODIUM CHLORIDE 0.9 %
1000 INTRAVENOUS SOLUTION INTRAVENOUS ONCE
Status: COMPLETED | OUTPATIENT
Start: 2023-10-29 | End: 2023-10-29

## 2023-10-29 RX ORDER — PHENAZOPYRIDINE HYDROCHLORIDE 200 MG/1
200 TABLET, FILM COATED ORAL ONCE
Status: COMPLETED | OUTPATIENT
Start: 2023-10-29 | End: 2023-10-29

## 2023-10-29 RX ORDER — CEPHALEXIN 500 MG/1
500 CAPSULE ORAL 2 TIMES DAILY
Qty: 14 CAPSULE | Refills: 0 | Status: SHIPPED | OUTPATIENT
Start: 2023-10-29 | End: 2023-11-05

## 2023-10-29 RX ORDER — ONDANSETRON 2 MG/ML
4 INJECTION INTRAMUSCULAR; INTRAVENOUS ONCE
Status: COMPLETED | OUTPATIENT
Start: 2023-10-29 | End: 2023-10-29

## 2023-10-29 RX ADMIN — CEFTRIAXONE SODIUM 1000 MG: 1 INJECTION, POWDER, FOR SOLUTION INTRAMUSCULAR; INTRAVENOUS at 09:04

## 2023-10-29 RX ADMIN — SODIUM CHLORIDE 1000 ML: 9 INJECTION, SOLUTION INTRAVENOUS at 06:41

## 2023-10-29 RX ADMIN — PHENAZOPYRIDINE HYDROCHLORIDE 200 MG: 200 TABLET ORAL at 09:39

## 2023-10-29 RX ADMIN — ONDANSETRON 4 MG: 2 INJECTION INTRAMUSCULAR; INTRAVENOUS at 06:42

## 2023-10-29 ASSESSMENT — PAIN - FUNCTIONAL ASSESSMENT: PAIN_FUNCTIONAL_ASSESSMENT: 0-10

## 2023-10-29 ASSESSMENT — ENCOUNTER SYMPTOMS
VOMITING: 0
SHORTNESS OF BREATH: 0
CONSTIPATION: 0
COLOR CHANGE: 0
SORE THROAT: 0
NAUSEA: 1
EYE DISCHARGE: 0
ABDOMINAL DISTENTION: 0
BACK PAIN: 1
RHINORRHEA: 0
ABDOMINAL PAIN: 0

## 2023-10-29 ASSESSMENT — PAIN SCALES - GENERAL: PAINLEVEL_OUTOF10: 5

## 2023-10-29 ASSESSMENT — LIFESTYLE VARIABLES
HOW OFTEN DO YOU HAVE A DRINK CONTAINING ALCOHOL: NEVER
HOW MANY STANDARD DRINKS CONTAINING ALCOHOL DO YOU HAVE ON A TYPICAL DAY: PATIENT DOES NOT DRINK

## 2023-10-29 NOTE — ED NOTES
Report from [facility] at the bedside  Patient just returned from [address], [address], [address]  10/29/23 5104

## 2023-10-29 NOTE — ED NOTES
Patient antibiotic infused.  Medication to be given Pyridium for the discomfort     Jonelle Obregon RN  10/29/23 9267

## 2023-10-29 NOTE — ED PROVIDER NOTES
[facility] ED  EMERGENCY DEPARTMENT ENCOUNTER      Pt Name: Getachew Gloria  MRN: [id number]  [address] [facility] 1960  Date of evaluation: 10/29/2023  Provider: Rob Meyers PA-C  6:19 AM EDT    CHIEF COMPLAINT       Chief Complaint   Patient presents with    Flank Pain     Rights sided, started 1am         HISTORY OF PRESENT ILLNESS   (Location/Symptom, Timing/Onset, Context/Setting, Quality, Duration, Modifying Factors, Severity)  Note limiting factors. Getachew Gloria is a 59 y.o. female who presents to the emergency department with complaint of right-sided flank pain which patient states noticeably around 1 AM this morning nausea, states history of kidney stones, feels similar to previous. She denies any fevers, no urinary pain, hematuria, retrograde pain x5-10. States last kidney stone was present 1 year ago. Past med history significant for diabetes, arthritis, fibromyalgia, depression, headaches, chronic back pain, osteoporosis, kidney stones    HPI    Nursing Notes were reviewed. REVIEW OF SYSTEMS    (2-9 systems for level 4, 10 or more for level 5)     Review of Systems   Constitutional:  Negative for activity change and appetite change. HENT:  Negative for congestion, ear discharge, ear pain, nosebleeds, rhinorrhea and sore throat. Eyes:  Negative for discharge. Respiratory:  Negative for shortness of breath. Cardiovascular:  Negative for chest pain, palpitations and leg swelling. Gastrointestinal:  Positive for nausea. Negative for abdominal distention, abdominal pain, constipation and vomiting. Genitourinary:  Positive for flank pain. Negative for difficulty urinating, dysuria, hematuria and urgency. Musculoskeletal:  Positive for back pain. Negative for arthralgias. Skin:  Negative for color change. Neurological:  Negative for dizziness, tremors, syncope, weakness, numbness and headaches. Psychiatric/Behavioral:  Negative for agitation and confusion.

## 2023-10-29 NOTE — ED TRIAGE NOTES
Pt arrived via EMS. Pt c/o right sided flank pain that started at 1am.  Pt states hx of kidney stones. Pt also c/o nausea. PT rates pain 5/10.

## 2023-10-29 NOTE — ED NOTES
Patient is resting in the bed at this time with lights off and call light on the bed      Lorraine Lord  10/29/23 9803

## 2024-03-23 ENCOUNTER — APPOINTMENT (OUTPATIENT)
Dept: CT IMAGING | Age: 64
End: 2024-03-23
Payer: MEDICARE

## 2024-03-23 ENCOUNTER — HOSPITAL ENCOUNTER (EMERGENCY)
Age: 64
Discharge: HOME | End: 2024-03-23
Payer: MEDICARE

## 2024-03-23 ENCOUNTER — APPOINTMENT (OUTPATIENT)
Dept: GENERAL RADIOLOGY | Age: 64
End: 2024-03-23
Payer: MEDICARE

## 2024-03-23 VITALS
RESPIRATION RATE: 19 BRPM | TEMPERATURE: 98.2 F | OXYGEN SATURATION: 96 % | WEIGHT: 181 LBS | DIASTOLIC BLOOD PRESSURE: 76 MMHG | SYSTOLIC BLOOD PRESSURE: 165 MMHG | HEIGHT: 60 IN | BODY MASS INDEX: 35.53 KG/M2 | HEART RATE: 76 BPM

## 2024-03-23 DIAGNOSIS — K85.30 DRUG-INDUCED ACUTE PANCREATITIS, UNSPECIFIED COMPLICATION STATUS: Primary | ICD-10-CM

## 2024-03-23 LAB
ALBUMIN SERPL-MCNC: 4 G/DL (ref 3.5–4.6)
ALP SERPL-CCNC: 86 U/L (ref 40–130)
ALT SERPL-CCNC: 18 U/L (ref 0–33)
ANION GAP SERPL CALCULATED.3IONS-SCNC: 12 MEQ/L (ref 9–15)
AST SERPL-CCNC: 15 U/L (ref 0–35)
BASOPHILS # BLD: 0 K/UL (ref 0–0.2)
BASOPHILS NFR BLD: 0.4 %
BILIRUB SERPL-MCNC: 0.3 MG/DL (ref 0.2–0.7)
BUN SERPL-MCNC: 19 MG/DL (ref 8–23)
CALCIUM SERPL-MCNC: 9.8 MG/DL (ref 8.5–9.9)
CHLORIDE SERPL-SCNC: 98 MEQ/L (ref 95–107)
CO2 SERPL-SCNC: 26 MEQ/L (ref 20–31)
CREAT SERPL-MCNC: 1.08 MG/DL (ref 0.5–0.9)
EKG ATRIAL RATE: 73 BPM
EKG P AXIS: 37 DEGREES
EKG P-R INTERVAL: 148 MS
EKG Q-T INTERVAL: 406 MS
EKG QRS DURATION: 70 MS
EKG QTC CALCULATION (BAZETT): 447 MS
EKG R AXIS: 14 DEGREES
EKG T AXIS: 52 DEGREES
EKG VENTRICULAR RATE: 73 BPM
EOSINOPHIL # BLD: 0.1 K/UL (ref 0–0.7)
EOSINOPHIL NFR BLD: 1.2 %
ERYTHROCYTE [DISTWIDTH] IN BLOOD BY AUTOMATED COUNT: 15 % (ref 11.5–14.5)
GLOBULIN SER CALC-MCNC: 4 G/DL (ref 2.3–3.5)
GLUCOSE SERPL-MCNC: 218 MG/DL (ref 70–99)
HCT VFR BLD AUTO: 46.3 % (ref 37–47)
HGB BLD-MCNC: 14.6 G/DL (ref 12–16)
LIPASE SERPL-CCNC: 188 U/L (ref 12–95)
LYMPHOCYTES # BLD: 1.8 K/UL (ref 1–4.8)
LYMPHOCYTES NFR BLD: 16.9 %
MAGNESIUM SERPL-MCNC: 2 MG/DL (ref 1.7–2.4)
MCH RBC QN AUTO: 25.9 PG (ref 27–31.3)
MCHC RBC AUTO-ENTMCNC: 31.5 % (ref 33–37)
MCV RBC AUTO: 82.2 FL (ref 79.4–94.8)
MONOCYTES # BLD: 0.9 K/UL (ref 0.2–0.8)
MONOCYTES NFR BLD: 8.3 %
NEUTROPHILS # BLD: 7.8 K/UL (ref 1.4–6.5)
NEUTS SEG NFR BLD: 72.8 %
PLATELET # BLD AUTO: 368 K/UL (ref 130–400)
POC CREATININE WHOLE BLOOD: 1.08
POTASSIUM SERPL-SCNC: 3.5 MEQ/L (ref 3.4–4.9)
PROT SERPL-MCNC: 8 G/DL (ref 6.3–8)
RBC # BLD AUTO: 5.63 M/UL (ref 4.2–5.4)
SODIUM SERPL-SCNC: 136 MEQ/L (ref 135–144)
TROPONIN, HIGH SENSITIVITY: <6 NG/L (ref 0–19)
TROPONIN, HIGH SENSITIVITY: <6 NG/L (ref 0–19)
WBC # BLD AUTO: 10.7 K/UL (ref 4.8–10.8)

## 2024-03-23 PROCEDURE — 96361 HYDRATE IV INFUSION ADD-ON: CPT

## 2024-03-23 PROCEDURE — 36415 COLL VENOUS BLD VENIPUNCTURE: CPT

## 2024-03-23 PROCEDURE — 2500000003 HC RX 250 WO HCPCS: Performed by: PHYSICIAN ASSISTANT

## 2024-03-23 PROCEDURE — 93005 ELECTROCARDIOGRAM TRACING: CPT | Performed by: PHYSICIAN ASSISTANT

## 2024-03-23 PROCEDURE — 80053 COMPREHEN METABOLIC PANEL: CPT

## 2024-03-23 PROCEDURE — A4216 STERILE WATER/SALINE, 10 ML: HCPCS | Performed by: PHYSICIAN ASSISTANT

## 2024-03-23 PROCEDURE — 85025 COMPLETE CBC W/AUTO DIFF WBC: CPT

## 2024-03-23 PROCEDURE — 84484 ASSAY OF TROPONIN QUANT: CPT

## 2024-03-23 PROCEDURE — 71045 X-RAY EXAM CHEST 1 VIEW: CPT

## 2024-03-23 PROCEDURE — 83735 ASSAY OF MAGNESIUM: CPT

## 2024-03-23 PROCEDURE — 99285 EMERGENCY DEPT VISIT HI MDM: CPT

## 2024-03-23 PROCEDURE — 96374 THER/PROPH/DIAG INJ IV PUSH: CPT

## 2024-03-23 PROCEDURE — 6360000004 HC RX CONTRAST MEDICATION: Performed by: PHYSICIAN ASSISTANT

## 2024-03-23 PROCEDURE — 6370000000 HC RX 637 (ALT 250 FOR IP): Performed by: PHYSICIAN ASSISTANT

## 2024-03-23 PROCEDURE — 74177 CT ABD & PELVIS W/CONTRAST: CPT

## 2024-03-23 PROCEDURE — 2580000003 HC RX 258: Performed by: PHYSICIAN ASSISTANT

## 2024-03-23 PROCEDURE — 83690 ASSAY OF LIPASE: CPT

## 2024-03-23 RX ORDER — 0.9 % SODIUM CHLORIDE 0.9 %
500 INTRAVENOUS SOLUTION INTRAVENOUS ONCE
Status: COMPLETED | OUTPATIENT
Start: 2024-03-23 | End: 2024-03-23

## 2024-03-23 RX ORDER — TRAMADOL HYDROCHLORIDE 50 MG/1
50 TABLET ORAL ONCE
Status: COMPLETED | OUTPATIENT
Start: 2024-03-23 | End: 2024-03-23

## 2024-03-23 RX ORDER — TRAMADOL HYDROCHLORIDE 50 MG/1
50 TABLET ORAL EVERY 6 HOURS PRN
Qty: 12 TABLET | Refills: 0 | Status: SHIPPED | OUTPATIENT
Start: 2024-03-23 | End: 2024-03-26

## 2024-03-23 RX ADMIN — IOPAMIDOL 75 ML: 755 INJECTION, SOLUTION INTRAVENOUS at 10:20

## 2024-03-23 RX ADMIN — Medication: at 09:48

## 2024-03-23 RX ADMIN — TRAMADOL HYDROCHLORIDE 50 MG: 50 TABLET ORAL at 12:13

## 2024-03-23 RX ADMIN — SODIUM CHLORIDE 500 ML: 9 INJECTION, SOLUTION INTRAVENOUS at 09:46

## 2024-03-23 RX ADMIN — FAMOTIDINE 20 MG: 10 INJECTION, SOLUTION INTRAVENOUS at 09:52

## 2024-03-23 ASSESSMENT — ENCOUNTER SYMPTOMS
BACK PAIN: 0
DIARRHEA: 0
SHORTNESS OF BREATH: 0
ABDOMINAL PAIN: 1
SORE THROAT: 0
COUGH: 0
EYE PAIN: 0
PHOTOPHOBIA: 0
NAUSEA: 0
RHINORRHEA: 0

## 2024-03-23 ASSESSMENT — PAIN DESCRIPTION - ORIENTATION
ORIENTATION: MID;UPPER
ORIENTATION: UPPER;ANTERIOR;MID

## 2024-03-23 ASSESSMENT — PAIN DESCRIPTION - DESCRIPTORS
DESCRIPTORS: ACHING
DESCRIPTORS: SQUEEZING;DISCOMFORT

## 2024-03-23 ASSESSMENT — PAIN SCALES - GENERAL
PAINLEVEL_OUTOF10: 8
PAINLEVEL_OUTOF10: 7

## 2024-03-23 ASSESSMENT — PAIN DESCRIPTION - LOCATION
LOCATION: ABDOMEN
LOCATION: ABDOMEN

## 2024-03-23 ASSESSMENT — PAIN DESCRIPTION - PAIN TYPE: TYPE: ACUTE PAIN

## 2024-03-23 ASSESSMENT — PAIN - FUNCTIONAL ASSESSMENT: PAIN_FUNCTIONAL_ASSESSMENT: ACTIVITIES ARE NOT PREVENTED

## 2024-03-23 NOTE — ED NOTES
Hope pa aware of pt's bp, 0 new orders.  Per pa ok to d/c pt home, pt a&ox4, skin w/d/pink, 0 n&v, 0 sob, 0 distress, pt out from ed with steady agit, 0 problems. Going home with daughter.

## 2024-03-23 NOTE — DISCHARGE INSTRUCTIONS
Hold off on taking your ozempic until speaking with your doctor.   Clear liquid diet until pain improves.  If pain worsens or does not get better in the next 24-48 hours come back to the ER.

## 2024-03-23 NOTE — ED NOTES
Pt in bed resting comfortably. Labs, EKG, ordered meds,  bolus w/ 20 ga cath in Left arm. Will continue to monitor.

## 2024-03-23 NOTE — ED PROVIDER NOTES
[facility] ED  eMERGENCY dEPARTMENTeNCOUnter      Pt Name: Agatha Quinonez  MRN: [id number]  Birthdate 1960  Date ofevaluation: 3/23/2024  Provider: Tahira Flores PA-C    CHIEF COMPLAINT       Chief Complaint   Patient presents with    Abdominal Pain     Pt stated that she has non-alcohol induced cirrhosis and is having increased bloating and pain         HISTORY OF PRESENT ILLNESS   (Location/Symptom, Timing/Onset,Context/Setting, Quality, Duration, Modifying Factors, Severity)  Note limiting factors.   Agatha Quionnez is a 63 y.o. female who presents to the emergency department epigastric abdominal pain. \"Liver pain\" per pt. Pt reports 2 days of abd pain. Not dull, not sharp hard to describe. Used an ice pack that seemed to help. Has h/o gerd on nexium but doesn't feel like heart burn. Was diagnosed a few years ago with nonalcoholic cirrhosis, she does not follow with a liver specialist enormity specific medications for this.  She does not have any chest pain back pain jaw pain or arm pain.  There is been no nausea vomiting or diarrhea.  She does not take NSAIDs on a daily basis.  She does have a history of CKD.    HPI    NursingNotes were reviewed.    REVIEW OF SYSTEMS    (2-9 systems for level 4, 10 or more for level 5)     Review of Systems   Constitutional:  Negative for chills, diaphoresis, fatigue and fever.   HENT:  Negative for congestion, rhinorrhea and sore throat.    Eyes:  Negative for photophobia and pain.   Respiratory:  Negative for cough and shortness of breath.    Cardiovascular:  Negative for chest pain and palpitations.   Gastrointestinal:  Positive for abdominal pain. Negative for diarrhea, nausea and vomiting.   Genitourinary:  Negative for dysuria and flank pain.   Musculoskeletal:  Negative for back pain.   Skin:  Negative for rash.   Neurological:  Negative for dizziness, light-headedness and headaches.   Psychiatric/Behavioral: Negative.     All other systems  DIFFERENTIAL - Abnormal; Notable for the following components:       Result Value    RBC 5.63 (*)     MCH 25.9 (*)     MCHC 31.5 (*)     RDW 15.0 (*)     Neutrophils Absolute 7.8 (*)     Monocytes Absolute 0.9 (*)     All other components within normal limits   COMPREHENSIVE METABOLIC PANEL - Abnormal; Notable for the following components:    Glucose 218 (*)     Creatinine 1.08 (*)     Est, Glom Filt Rate 57.7 (*)     Globulin 4.0 (*)     All other components within normal limits   LIPASE - Abnormal; Notable for the following components:    Lipase 188 (*)     All other components within normal limits   POCT CREATININE - Normal   TROPONIN   TROPONIN   MAGNESIUM       All other labs were within normal range or not returned as of this dictation.    EMERGENCY DEPARTMENT COURSE and DIFFERENTIAL DIAGNOSIS/MDM:   Vitals:    Vitals:    03/23/24 1130 03/23/24 1150 03/23/24 1213 03/23/24 1215   BP:  (!) 173/100 (!) 177/106 (!) 165/76   Pulse: 75 75 77 76   Resp:  19 17 19   Temp:       TempSrc:       SpO2:  96% 97% 96%   Weight:       Height:               MDM    Patient presents to the emergency department epigastric pain has been ongoing for the last couple of days.  She is Reproducible epigastric tenderness to palpation no right upper quadrant pain.  Troponin x 2 with no significant delta change EKG with no ischemic changes and nothing that is ACS at this time.  Patient's lipase mildly elevated at 133 labs otherwise grossly unremarkable.  Upon further questioning as how this could be an early pancreatitis patient's family does note that she has been on Ozempic for the last several weeks.  I think that this could be a contributing factor as she does not drink alcohol regularly no history of gallstones no right upper quadrant tenderness to palpation no signs for acute cholecystitis at this time.  Will discharge home with tramadol for pain clear liquid diet as this is mild pancreatitis and patient is in agreement to try this

## 2024-03-23 NOTE — ED NOTES
Hope pa aware of pt's bp, 0 new orders at this time.  Pt stable, a&ox4, skin w/d/pink, 0 c/o, 0 problems, pt talking over the phone.

## 2024-03-25 LAB
EKG ATRIAL RATE: 73 BPM
EKG P AXIS: 37 DEGREES
EKG P-R INTERVAL: 148 MS
EKG Q-T INTERVAL: 406 MS
EKG QRS DURATION: 70 MS
EKG QTC CALCULATION (BAZETT): 447 MS
EKG R AXIS: 14 DEGREES
EKG T AXIS: 52 DEGREES
EKG VENTRICULAR RATE: 73 BPM

## 2024-11-18 ENCOUNTER — APPOINTMENT (OUTPATIENT)
Dept: GENERAL RADIOLOGY | Age: 64
End: 2024-11-18
Payer: MEDICARE

## 2024-11-18 ENCOUNTER — HOSPITAL ENCOUNTER (EMERGENCY)
Age: 64
Discharge: HOME | End: 2024-11-18
Attending: STUDENT IN AN ORGANIZED HEALTH CARE EDUCATION/TRAINING PROGRAM
Payer: MEDICARE

## 2024-11-18 VITALS
BODY MASS INDEX: 37.89 KG/M2 | OXYGEN SATURATION: 99 % | SYSTOLIC BLOOD PRESSURE: 115 MMHG | HEIGHT: 60 IN | RESPIRATION RATE: 18 BRPM | DIASTOLIC BLOOD PRESSURE: 72 MMHG | TEMPERATURE: 98 F | WEIGHT: 193 LBS | HEART RATE: 83 BPM

## 2024-11-18 DIAGNOSIS — R07.81 RIB PAIN ON LEFT SIDE: Primary | ICD-10-CM

## 2024-11-18 DIAGNOSIS — R09.89 CHOKING EPISODE: ICD-10-CM

## 2024-11-18 PROCEDURE — 71046 X-RAY EXAM CHEST 2 VIEWS: CPT

## 2024-11-18 PROCEDURE — 6370000000 HC RX 637 (ALT 250 FOR IP): Performed by: STUDENT IN AN ORGANIZED HEALTH CARE EDUCATION/TRAINING PROGRAM

## 2024-11-18 PROCEDURE — 96372 THER/PROPH/DIAG INJ SC/IM: CPT

## 2024-11-18 PROCEDURE — 99284 EMERGENCY DEPT VISIT MOD MDM: CPT

## 2024-11-18 PROCEDURE — 6360000002 HC RX W HCPCS: Performed by: STUDENT IN AN ORGANIZED HEALTH CARE EDUCATION/TRAINING PROGRAM

## 2024-11-18 RX ORDER — LIDOCAINE 4 G/G
1 PATCH TOPICAL ONCE
Status: DISCONTINUED | OUTPATIENT
Start: 2024-11-18 | End: 2024-11-19 | Stop reason: HOSPADM

## 2024-11-18 RX ORDER — KETOROLAC TROMETHAMINE 15 MG/ML
15 INJECTION, SOLUTION INTRAMUSCULAR; INTRAVENOUS ONCE
Status: COMPLETED | OUTPATIENT
Start: 2024-11-18 | End: 2024-11-18

## 2024-11-18 RX ADMIN — KETOROLAC TROMETHAMINE 15 MG: 15 INJECTION, SOLUTION INTRAMUSCULAR; INTRAVENOUS at 23:03

## 2024-11-18 ASSESSMENT — PAIN DESCRIPTION - ORIENTATION: ORIENTATION: LEFT

## 2024-11-18 ASSESSMENT — PAIN DESCRIPTION - DESCRIPTORS: DESCRIPTORS: SHARP

## 2024-11-18 ASSESSMENT — LIFESTYLE VARIABLES
HOW MANY STANDARD DRINKS CONTAINING ALCOHOL DO YOU HAVE ON A TYPICAL DAY: PATIENT DOES NOT DRINK
HOW OFTEN DO YOU HAVE A DRINK CONTAINING ALCOHOL: NEVER

## 2024-11-18 ASSESSMENT — PAIN DESCRIPTION - LOCATION: LOCATION: RIB CAGE

## 2024-11-18 ASSESSMENT — PAIN SCALES - GENERAL: PAINLEVEL_OUTOF10: 8

## 2024-11-19 NOTE — ED PROVIDER NOTES
[facility] ED  Emergency Department Encounter  Emergency Medicine      Pt Name: Agatha Quinonez  MRN:[id number]  Birthdate 1960  Date of evaluation: 24  Time: 10:14 PM EST  PCP:  Tim Rincon MD    CHIEF COMPLAINT       Chief Complaint   Patient presents with    Rib Pain     Daughter preformed heimlich while she was choking and now is having rib pain on left side       HISTORY OF PRESENT ILLNESS  (Location/Symptom, Timing/Onset, Context/Setting, Quality, Duration, ModifyingFactors, Severity.)      Agatha Quinonez is a 64 y.o. female acute left rib pain.  Daughter performed Heimlich on her because she was choking on some chicken she got the chicken out she is breathing comfortably little bit of throat pain nothing significant.  Main complaint is left rib pain.  Not short of breath no chest pain no abdominal pain nausea vomiting    PAST MEDICAL / SURGICAL / SOCIAL /FAMILY HISTORY      has a past medical history of Arthritis, Arthritis, Atrophic kidney, Bulging lumbar disc, Chronic back pain, Chronic headaches, Chronic kidney disease, Depression, Diabetes mellitus (HCC), Fibromyalgia, Fibromyalgia, Hydroureteronephrosis - right, obstructive, worsening, Hypomagnesemia, Nonadherence to medical treatment, Osteoporosis with current pathological fracture with routine healing, and Recurrent major depressive disorder, in partial remission (HCC).  No other pertinent PMH on review with patient/guardian.     has a past surgical history that includes Kidney stone surgery ();  section; Finger trigger release (Left, 2020); and Finger trigger release (Right, 11/3/2020).  No other pertinent PSH on review with patient/guardian.  Social History     Socioeconomic History    Marital status:      Spouse name: Not on file    Number of children: Not on file    Years of education: Not on file    Highest education level: Not on file   Occupational History    Not on file   Tobacco

## 2025-02-20 ENCOUNTER — HOSPITAL ENCOUNTER (EMERGENCY)
Age: 65
Discharge: HOME | End: 2025-02-20
Payer: MEDICARE

## 2025-02-20 ENCOUNTER — APPOINTMENT (OUTPATIENT)
Dept: GENERAL RADIOLOGY | Age: 65
End: 2025-02-20
Payer: MEDICARE

## 2025-02-20 VITALS
HEART RATE: 67 BPM | WEIGHT: 184 LBS | OXYGEN SATURATION: 98 % | TEMPERATURE: 98.1 F | DIASTOLIC BLOOD PRESSURE: 62 MMHG | BODY MASS INDEX: 35.94 KG/M2 | SYSTOLIC BLOOD PRESSURE: 111 MMHG | RESPIRATION RATE: 18 BRPM

## 2025-02-20 DIAGNOSIS — T14.8XXA ABRASION: ICD-10-CM

## 2025-02-20 DIAGNOSIS — M25.562 ACUTE PAIN OF BOTH KNEES: ICD-10-CM
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
## 2025-02-20 DIAGNOSIS — M25.561 ACUTE PAIN OF BOTH KNEES: ICD-10-CM

## 2025-02-20 PROCEDURE — 73560 X-RAY EXAM OF KNEE 1 OR 2: CPT

## 2025-02-20 PROCEDURE — 90714 TD VACC NO PRESV 7 YRS+ IM: CPT

## 2025-02-20 PROCEDURE — 6360000002 HC RX W HCPCS

## 2025-02-20 PROCEDURE — 90471 IMMUNIZATION ADMIN: CPT

## 2025-02-20 PROCEDURE — 99284 EMERGENCY DEPT VISIT MOD MDM: CPT

## 2025-02-20 PROCEDURE — 6370000000 HC RX 637 (ALT 250 FOR IP)

## 2025-02-20 RX ORDER — BACITRACIN ZINC AND POLYMYXIN B SULFATE 500; 1000 [USP'U]/G; [USP'U]/G
OINTMENT TOPICAL
Qty: 28.4 G | Refills: 0 | Status: SHIPPED | OUTPATIENT
Start: 2025-02-20 | End: 2025-02-27

## 2025-02-20 RX ORDER — ACETAMINOPHEN 500 MG
1000 TABLET ORAL 3 TIMES DAILY PRN
Qty: 540 TABLET | Refills: 1 | Status: SHIPPED | OUTPATIENT
Start: 2025-02-20

## 2025-02-20 RX ORDER — ACETAMINOPHEN 500 MG
1000 TABLET ORAL ONCE
Status: COMPLETED | OUTPATIENT
Start: 2025-02-20 | End: 2025-02-20

## 2025-02-20 RX ADMIN — CLOSTRIDIUM TETANI TOXOID ANTIGEN (FORMALDEHYDE INACTIVATED) AND CORYNEBACTERIUM DIPHTHERIAE TOXOID ANTIGEN (FORMALDEHYDE INACTIVATED) 0.5 ML: 5; 2 INJECTION, SUSPENSION INTRAMUSCULAR at 18:40

## 2025-02-20 RX ADMIN — ACETAMINOPHEN 1000 MG: 500 TABLET ORAL at 18:40

## 2025-02-20 ASSESSMENT — PAIN SCALES - GENERAL: PAINLEVEL_OUTOF10: 8

## 2025-02-20 ASSESSMENT — PAIN - FUNCTIONAL ASSESSMENT: PAIN_FUNCTIONAL_ASSESSMENT: NONE - DENIES PAIN

## 2025-02-20 NOTE — ED TRIAGE NOTES
Pt came to er via [facility] reports fell x 2 today onto knees c/o knee pain left worse than right . Pt states she stepped into a hole that was covered with snow and went down on her knees, states she got up from that fall went to go into store and the left knee buckled causing her to fall back onto her knees

## 2025-02-20 NOTE — DISCHARGE INSTRUCTIONS
Take/apply medications as directed.     RICE (rest, ice, compress, elevate).     Follow-up with PCP, orthopedics.     Return to ED if any new, or worsening symptoms.

## 2025-02-21 NOTE — ED PROVIDER NOTES
[facility] EMERGENCY DEPARTMENT  EMERGENCY DEPARTMENT ENCOUNTER      Pt Name: Agatha Quinonez  MRN: [id number]  Birthdate 1960  Date of evaluation: 2/20/2025  Provider: RAMANA Neal  10:34 AM EST    CHIEF COMPLAINT       Chief Complaint   Patient presents with    Fall         HISTORY OF PRESENT ILLNESS   (Location/Symptom, Timing/Onset, Context/Setting, Quality, Duration, Modifying Factors, Severity)  Note limiting factors.   Agatha Quinonez is a 64 y.o. female whom per chart review has a PMHx of fibromyalgia, chronic back pain, CKD, osteoporosis, depression, obesity, vitamin D deficiency, GERD presents to ED via EMS for evaluation following a fall.  Patient reports that she fell twice this afternoon prior to arrival in the emergency department, injuring bilateral knees.  Patient denies head injury, LOC, anticoagulation.  Reports that she does have abrasions to bilateral knees which she has already cleansed and applied bacitracin's, as well as nonadherent dressings.  Patient denies taking any over-the-counter medications for ROS PTA.  States that she is able to ambulate, however does endorse some increased discomfort ambulation.  No additional complaints verbalized.  Patient denies chest pain, shortness of breath, N/V/D, fever, chills.    Patient is not up-to-date on Tdap.    HPI    Nursing Notes were reviewed.    REVIEW OF SYSTEMS    (2-9 systems for level 4, 10 or more for level 5)     Review of Systems   Musculoskeletal:  Positive for arthralgias (Bilateral knees).   Skin:  Positive for wound.   All other systems reviewed and are negative.      Except as noted above the remainder of the review of systems was reviewed and negative.       PAST MEDICAL HISTORY     Past Medical History:   Diagnosis Date    Arthritis     Arthritis     Atrophic kidney 12/23/2020    Bulging lumbar disc     lower back    Chronic back pain     Chronic headaches     Chronic kidney disease     Depression     Diabetes

## 2025-06-08 ENCOUNTER — HOSPITAL ENCOUNTER (INPATIENT)
Age: 65
LOS: 2 days | Discharge: HOME HEALTH | DRG: 871 | End: 2025-06-11
Attending: EMERGENCY MEDICINE | Admitting: INTERNAL MEDICINE
Payer: MEDICARE

## 2025-06-08 DIAGNOSIS — A41.9 SEPSIS WITH ENCEPHALOPATHY WITHOUT SEPTIC SHOCK, DUE TO UNSPECIFIED ORGANISM (HCC): Primary | ICD-10-CM

## 2025-06-08 DIAGNOSIS — G93.41 SEPSIS WITH ENCEPHALOPATHY WITHOUT SEPTIC SHOCK, DUE TO UNSPECIFIED ORGANISM (HCC): Primary | ICD-10-CM

## 2025-06-08 DIAGNOSIS — N30.01 ACUTE CYSTITIS WITH HEMATURIA: ICD-10-CM

## 2025-06-08 DIAGNOSIS — R65.20 SEPSIS WITH ENCEPHALOPATHY WITHOUT SEPTIC SHOCK, DUE TO UNSPECIFIED ORGANISM (HCC): Primary | ICD-10-CM

## 2025-06-08 PROCEDURE — 99285 EMERGENCY DEPT VISIT HI MDM: CPT

## 2025-06-08 ASSESSMENT — PAIN - FUNCTIONAL ASSESSMENT: PAIN_FUNCTIONAL_ASSESSMENT: NONE - DENIES PAIN

## 2025-06-09 ENCOUNTER — APPOINTMENT (OUTPATIENT)
Dept: CT IMAGING | Age: 65
DRG: 871 | End: 2025-06-09
Payer: MEDICARE

## 2025-06-09 ENCOUNTER — APPOINTMENT (OUTPATIENT)
Dept: GENERAL RADIOLOGY | Age: 65
DRG: 871 | End: 2025-06-09
Payer: MEDICARE

## 2025-06-09 PROBLEM — N39.0 UTI (URINARY TRACT INFECTION): Status: ACTIVE | Noted: 2025-06-09

## 2025-06-09 LAB
ALBUMIN SERPL-MCNC: 4.2 G/DL (ref 3.5–4.6)
ALP SERPL-CCNC: 92 U/L (ref 40–130)
ALT SERPL-CCNC: 23 U/L (ref 0–33)
AMPHET UR QL SCN: NORMAL
ANION GAP SERPL CALCULATED.3IONS-SCNC: 11 MEQ/L (ref 9–15)
ANISOCYTOSIS BLD QL SMEAR: ABNORMAL
AST SERPL-CCNC: 15 U/L (ref 0–35)
B-OH-BUTYR SERPL-SCNC: 1.4 MG/DL (ref 0.2–2.8)
BACTERIA URNS QL MICRO: ABNORMAL /HPF
BARBITURATES UR QL SCN: NORMAL
BASE EXCESS VENOUS: -1 (ref -3–3)
BASOPHILS # BLD: 0.2 K/UL (ref 0–0.2)
BASOPHILS NFR BLD: 1 %
BENZODIAZ UR QL SCN: NORMAL
BILIRUB SERPL-MCNC: 0.7 MG/DL (ref 0.2–0.7)
BILIRUB UR QL STRIP: NEGATIVE
BUN SERPL-MCNC: 26 MG/DL (ref 8–23)
CALCIUM IONIZED: 1 MMOL/L (ref 1.12–1.32)
CALCIUM SERPL-MCNC: 10.1 MG/DL (ref 8.5–9.9)
CANNABINOIDS UR QL SCN: NORMAL
CHLORIDE SERPL-SCNC: 97 MEQ/L (ref 95–107)
CLARITY UR: ABNORMAL
CO2 SERPL-SCNC: 25 MEQ/L (ref 20–31)
COCAINE UR QL SCN: NORMAL
COLOR UR: YELLOW
CREAT SERPL-MCNC: 1.84 MG/DL (ref 0.5–0.9)
D DIMER PPP FEU-MCNC: 2.54 MG/L FEU (ref 0–0.5)
DRUG SCREEN COMMENT UR-IMP: NORMAL
EKG ATRIAL RATE: 93 BPM
EKG DIAGNOSIS: NORMAL
EKG P AXIS: 41 DEGREES
EKG P-R INTERVAL: 134 MS
EKG Q-T INTERVAL: 328 MS
EKG QRS DURATION: 64 MS
EKG QTC CALCULATION (BAZETT): 407 MS
EKG R AXIS: 19 DEGREES
EKG T AXIS: 62 DEGREES
EKG VENTRICULAR RATE: 93 BPM
EOSINOPHIL # BLD: 0.2 K/UL (ref 0–0.7)
EOSINOPHIL NFR BLD: 1 %
EPI CELLS #/AREA URNS AUTO: ABNORMAL /HPF (ref 0–5)
ERYTHROCYTE [DISTWIDTH] IN BLOOD BY AUTOMATED COUNT: 16 % (ref 11.5–14.5)
ETHANOL PERCENT: NORMAL G/DL
ETHANOLAMINE SERPL-MCNC: <10 MG/DL (ref 0–0.08)
FENTANYL SCREEN, URINE: NORMAL
GLOBULIN SER CALC-MCNC: 4.4 G/DL (ref 2.3–3.5)
GLUCOSE BLD-MCNC: 147 MG/DL (ref 70–99)
GLUCOSE BLD-MCNC: 154 MG/DL (ref 70–99)
GLUCOSE BLD-MCNC: 211 MG/DL (ref 70–99)
GLUCOSE BLD-MCNC: 244 MG/DL (ref 70–99)
GLUCOSE BLD-MCNC: 283 MG/DL (ref 70–99)
GLUCOSE BLD-MCNC: 297 MG/DL (ref 70–99)
GLUCOSE SERPL-MCNC: 236 MG/DL (ref 70–99)
GLUCOSE UR STRIP-MCNC: >=1000 MG/DL
HCO3 VENOUS: 22.9 MMOL/L (ref 23–29)
HCT VFR BLD AUTO: 47 % (ref 37–47)
HCT VFR BLD AUTO: 50 % (ref 36–48)
HGB BLD CALC-MCNC: 17.1 GM/DL (ref 12–16)
HGB BLD-MCNC: 15.5 G/DL (ref 12–16)
HGB UR QL STRIP: ABNORMAL
HYALINE CASTS #/AREA URNS AUTO: ABNORMAL /HPF (ref 0–5)
KETONES UR STRIP-MCNC: NEGATIVE MG/DL
LACTATE: 1.68 MMOL/L (ref 0.4–2)
LACTIC ACID, SEPSIS: 1.5 MMOL/L (ref 0.5–1.9)
LACTIC ACID, SEPSIS: 1.7 MMOL/L (ref 0.5–1.9)
LEUKOCYTE ESTERASE UR QL STRIP: ABNORMAL
LIPASE SERPL-CCNC: 25 U/L (ref 12–95)
LYMPHOCYTES # BLD: 1 K/UL (ref 1–4.8)
LYMPHOCYTES NFR BLD: 6 %
MAGNESIUM SERPL-MCNC: 1.8 MG/DL (ref 1.7–2.4)
MCH RBC QN AUTO: 28.4 PG (ref 27–31.3)
MCHC RBC AUTO-ENTMCNC: 33 % (ref 33–37)
MCV RBC AUTO: 86.1 FL (ref 79.4–94.8)
METHADONE UR QL SCN: NORMAL
MONOCYTES # BLD: 1.2 K/UL (ref 0.2–0.8)
MONOCYTES NFR BLD: 6.6 %
NEUTROPHILS # BLD: 14.5 K/UL (ref 1.4–6.5)
NEUTS SEG NFR BLD: 86 %
NITRITE UR QL STRIP: NEGATIVE
O2 SAT, VEN: 44 %
OPIATES UR QL SCN: NORMAL
OXYCODONE UR QL SCN: NORMAL
PCO2 VENOUS: 30.4 MM HG (ref 40–50)
PCP UR QL SCN: NORMAL
PERFORMED ON: ABNORMAL
PH UR STRIP: 6.5 [PH] (ref 5–9)
PH VENOUS: 7.49 (ref 7.32–7.42)
PLATELET # BLD AUTO: 275 K/UL (ref 130–400)
PO2 VENOUS: 22 MM HG
POC CHLORIDE: 106 MEQ/L (ref 99–110)
POC CREATININE: 1.6 MG/DL (ref 0.6–1.2)
POC FIO2: 21
POC SAMPLE TYPE: ABNORMAL
POIKILOCYTOSIS BLD QL SMEAR: ABNORMAL
POTASSIUM SERPL-SCNC: 5.1 MEQ/L (ref 3.5–5.1)
POTASSIUM SERPL-SCNC: 5.6 MEQ/L (ref 3.4–4.9)
PROCALCITONIN SERPL IA-MCNC: 0.28 NG/ML (ref 0–0.15)
PROPOXYPH UR QL SCN: NORMAL
PROT SERPL-MCNC: 8.6 G/DL (ref 6.3–8)
PROT UR STRIP-MCNC: 30 MG/DL
RBC # BLD AUTO: 5.46 M/UL (ref 4.2–5.4)
RBC #/AREA URNS AUTO: ABNORMAL /HPF (ref 0–5)
SODIUM BLD-SCNC: 135 MEQ/L (ref 136–145)
SODIUM SERPL-SCNC: 133 MEQ/L (ref 135–144)
SP GR UR STRIP: 1.02 (ref 1–1.03)
TCO2 CALC VENOUS: 24 MMOL/L
URINE REFLEX TO CULTURE: YES
UROBILINOGEN UR STRIP-ACNC: 0.2 E.U./DL
WBC # BLD AUTO: 16.9 K/UL (ref 4.8–10.8)
WBC #/AREA URNS AUTO: >100 /HPF (ref 0–5)

## 2025-06-09 PROCEDURE — 74176 CT ABD & PELVIS W/O CONTRAST: CPT

## 2025-06-09 PROCEDURE — 84132 ASSAY OF SERUM POTASSIUM: CPT

## 2025-06-09 PROCEDURE — 2500000003 HC RX 250 WO HCPCS

## 2025-06-09 PROCEDURE — 71045 X-RAY EXAM CHEST 1 VIEW: CPT

## 2025-06-09 PROCEDURE — 6370000000 HC RX 637 (ALT 250 FOR IP): Performed by: INTERNAL MEDICINE

## 2025-06-09 PROCEDURE — 87086 URINE CULTURE/COLONY COUNT: CPT

## 2025-06-09 PROCEDURE — 87077 CULTURE AEROBIC IDENTIFY: CPT

## 2025-06-09 PROCEDURE — 6360000002 HC RX W HCPCS

## 2025-06-09 PROCEDURE — 82077 ASSAY SPEC XCP UR&BREATH IA: CPT

## 2025-06-09 PROCEDURE — 36415 COLL VENOUS BLD VENIPUNCTURE: CPT

## 2025-06-09 PROCEDURE — 82435 ASSAY OF BLOOD CHLORIDE: CPT

## 2025-06-09 PROCEDURE — 6360000002 HC RX W HCPCS: Performed by: STUDENT IN AN ORGANIZED HEALTH CARE EDUCATION/TRAINING PROGRAM

## 2025-06-09 PROCEDURE — 87040 BLOOD CULTURE FOR BACTERIA: CPT

## 2025-06-09 PROCEDURE — 83690 ASSAY OF LIPASE: CPT

## 2025-06-09 PROCEDURE — 6370000000 HC RX 637 (ALT 250 FOR IP): Performed by: STUDENT IN AN ORGANIZED HEALTH CARE EDUCATION/TRAINING PROGRAM

## 2025-06-09 PROCEDURE — 2580000003 HC RX 258

## 2025-06-09 PROCEDURE — 82565 ASSAY OF CREATININE: CPT

## 2025-06-09 PROCEDURE — 82330 ASSAY OF CALCIUM: CPT

## 2025-06-09 PROCEDURE — 80053 COMPREHEN METABOLIC PANEL: CPT

## 2025-06-09 PROCEDURE — 6370000000 HC RX 637 (ALT 250 FOR IP)

## 2025-06-09 PROCEDURE — 6360000002 HC RX W HCPCS: Performed by: INTERNAL MEDICINE

## 2025-06-09 PROCEDURE — 84295 ASSAY OF SERUM SODIUM: CPT

## 2025-06-09 PROCEDURE — 2500000003 HC RX 250 WO HCPCS: Performed by: INTERNAL MEDICINE

## 2025-06-09 PROCEDURE — 83605 ASSAY OF LACTIC ACID: CPT

## 2025-06-09 PROCEDURE — 82803 BLOOD GASES ANY COMBINATION: CPT

## 2025-06-09 PROCEDURE — 97162 PT EVAL MOD COMPLEX 30 MIN: CPT

## 2025-06-09 PROCEDURE — 80307 DRUG TEST PRSMV CHEM ANLYZR: CPT

## 2025-06-09 PROCEDURE — 84145 PROCALCITONIN (PCT): CPT

## 2025-06-09 PROCEDURE — 97166 OT EVAL MOD COMPLEX 45 MIN: CPT

## 2025-06-09 PROCEDURE — 70450 CT HEAD/BRAIN W/O DYE: CPT

## 2025-06-09 PROCEDURE — 81001 URINALYSIS AUTO W/SCOPE: CPT

## 2025-06-09 PROCEDURE — 83735 ASSAY OF MAGNESIUM: CPT

## 2025-06-09 PROCEDURE — 2580000003 HC RX 258: Performed by: STUDENT IN AN ORGANIZED HEALTH CARE EDUCATION/TRAINING PROGRAM

## 2025-06-09 PROCEDURE — 85025 COMPLETE CBC W/AUTO DIFF WBC: CPT

## 2025-06-09 PROCEDURE — 87186 SC STD MICRODIL/AGAR DIL: CPT

## 2025-06-09 PROCEDURE — 96374 THER/PROPH/DIAG INJ IV PUSH: CPT

## 2025-06-09 PROCEDURE — 2580000003 HC RX 258: Performed by: INTERNAL MEDICINE

## 2025-06-09 PROCEDURE — 82010 KETONE BODYS QUAN: CPT

## 2025-06-09 PROCEDURE — 93005 ELECTROCARDIOGRAM TRACING: CPT

## 2025-06-09 PROCEDURE — 85014 HEMATOCRIT: CPT

## 2025-06-09 PROCEDURE — 85379 FIBRIN DEGRADATION QUANT: CPT

## 2025-06-09 PROCEDURE — 1210000000 HC MED SURG R&B

## 2025-06-09 RX ORDER — UBIDECARENONE 75 MG
50 CAPSULE ORAL DAILY
Status: DISCONTINUED | OUTPATIENT
Start: 2025-06-09 | End: 2025-06-11 | Stop reason: HOSPADM

## 2025-06-09 RX ORDER — INSULIN GLARGINE 100 [IU]/ML
60 INJECTION, SOLUTION SUBCUTANEOUS EVERY MORNING
Status: DISCONTINUED | OUTPATIENT
Start: 2025-06-09 | End: 2025-06-11 | Stop reason: HOSPADM

## 2025-06-09 RX ORDER — MECLIZINE HYDROCHLORIDE 25 MG/1
25 TABLET ORAL 3 TIMES DAILY PRN
COMMUNITY

## 2025-06-09 RX ORDER — SODIUM CHLORIDE 0.9 % (FLUSH) 0.9 %
5-40 SYRINGE (ML) INJECTION PRN
Status: DISCONTINUED | OUTPATIENT
Start: 2025-06-09 | End: 2025-06-11 | Stop reason: HOSPADM

## 2025-06-09 RX ORDER — ONDANSETRON 4 MG/1
4 TABLET, ORALLY DISINTEGRATING ORAL EVERY 8 HOURS PRN
Status: DISCONTINUED | OUTPATIENT
Start: 2025-06-09 | End: 2025-06-11 | Stop reason: HOSPADM

## 2025-06-09 RX ORDER — 0.9 % SODIUM CHLORIDE 0.9 %
30 INTRAVENOUS SOLUTION INTRAVENOUS ONCE
Status: DISCONTINUED | OUTPATIENT
Start: 2025-06-09 | End: 2025-06-09

## 2025-06-09 RX ORDER — ENOXAPARIN SODIUM 100 MG/ML
40 INJECTION SUBCUTANEOUS DAILY
Status: DISCONTINUED | OUTPATIENT
Start: 2025-06-09 | End: 2025-06-11 | Stop reason: HOSPADM

## 2025-06-09 RX ORDER — PANTOPRAZOLE SODIUM 40 MG/1
40 TABLET, DELAYED RELEASE ORAL
Status: DISCONTINUED | OUTPATIENT
Start: 2025-06-10 | End: 2025-06-11 | Stop reason: HOSPADM

## 2025-06-09 RX ORDER — INSULIN LISPRO 100 [IU]/ML
0-8 INJECTION, SOLUTION INTRAVENOUS; SUBCUTANEOUS
Status: DISCONTINUED | OUTPATIENT
Start: 2025-06-09 | End: 2025-06-11 | Stop reason: HOSPADM

## 2025-06-09 RX ORDER — POTASSIUM CHLORIDE 1500 MG/1
40 TABLET, EXTENDED RELEASE ORAL PRN
Status: DISCONTINUED | OUTPATIENT
Start: 2025-06-09 | End: 2025-06-11 | Stop reason: HOSPADM

## 2025-06-09 RX ORDER — LISINOPRIL 20 MG/1
20 TABLET ORAL DAILY
Status: DISCONTINUED | OUTPATIENT
Start: 2025-06-09 | End: 2025-06-11 | Stop reason: HOSPADM

## 2025-06-09 RX ORDER — SODIUM CHLORIDE 0.9 % (FLUSH) 0.9 %
5-40 SYRINGE (ML) INJECTION EVERY 12 HOURS SCHEDULED
Status: DISCONTINUED | OUTPATIENT
Start: 2025-06-09 | End: 2025-06-11 | Stop reason: HOSPADM

## 2025-06-09 RX ORDER — TAMSULOSIN HYDROCHLORIDE 0.4 MG/1
0.4 CAPSULE ORAL DAILY
Status: DISCONTINUED | OUTPATIENT
Start: 2025-06-09 | End: 2025-06-11 | Stop reason: HOSPADM

## 2025-06-09 RX ORDER — LISINOPRIL 20 MG/1
20 TABLET ORAL DAILY
COMMUNITY

## 2025-06-09 RX ORDER — POLYETHYLENE GLYCOL 3350 17 G/17G
17 POWDER, FOR SOLUTION ORAL DAILY PRN
Status: DISCONTINUED | OUTPATIENT
Start: 2025-06-09 | End: 2025-06-11 | Stop reason: HOSPADM

## 2025-06-09 RX ORDER — FAMOTIDINE 20 MG/1
20 TABLET, FILM COATED ORAL 2 TIMES DAILY
COMMUNITY

## 2025-06-09 RX ORDER — SODIUM CHLORIDE 9 MG/ML
INJECTION, SOLUTION INTRAVENOUS PRN
Status: DISCONTINUED | OUTPATIENT
Start: 2025-06-09 | End: 2025-06-11 | Stop reason: HOSPADM

## 2025-06-09 RX ORDER — SODIUM CHLORIDE 9 MG/ML
INJECTION, SOLUTION INTRAVENOUS CONTINUOUS
Status: DISPENSED | OUTPATIENT
Start: 2025-06-09 | End: 2025-06-10

## 2025-06-09 RX ORDER — PREGABALIN 100 MG/1
100 CAPSULE ORAL 2 TIMES DAILY
Status: DISCONTINUED | OUTPATIENT
Start: 2025-06-09 | End: 2025-06-11 | Stop reason: HOSPADM

## 2025-06-09 RX ORDER — EZETIMIBE 10 MG/1
10 TABLET ORAL DAILY
COMMUNITY

## 2025-06-09 RX ORDER — FERROUS SULFATE 325(65) MG
325 TABLET ORAL DAILY
Status: DISCONTINUED | OUTPATIENT
Start: 2025-06-09 | End: 2025-06-11 | Stop reason: HOSPADM

## 2025-06-09 RX ORDER — INSULIN LISPRO 100 [IU]/ML
5 INJECTION, SOLUTION INTRAVENOUS; SUBCUTANEOUS
Status: DISCONTINUED | OUTPATIENT
Start: 2025-06-09 | End: 2025-06-11 | Stop reason: HOSPADM

## 2025-06-09 RX ORDER — ATENOLOL 25 MG/1
25 TABLET ORAL DAILY
Status: DISCONTINUED | OUTPATIENT
Start: 2025-06-09 | End: 2025-06-11 | Stop reason: HOSPADM

## 2025-06-09 RX ORDER — ONDANSETRON 2 MG/ML
4 INJECTION INTRAMUSCULAR; INTRAVENOUS EVERY 6 HOURS PRN
Status: DISCONTINUED | OUTPATIENT
Start: 2025-06-09 | End: 2025-06-11 | Stop reason: HOSPADM

## 2025-06-09 RX ORDER — MAGNESIUM SULFATE IN WATER 40 MG/ML
2000 INJECTION, SOLUTION INTRAVENOUS PRN
Status: DISCONTINUED | OUTPATIENT
Start: 2025-06-09 | End: 2025-06-11 | Stop reason: HOSPADM

## 2025-06-09 RX ORDER — ACETAMINOPHEN 325 MG/1
650 TABLET ORAL ONCE
Status: COMPLETED | OUTPATIENT
Start: 2025-06-09 | End: 2025-06-09

## 2025-06-09 RX ORDER — DULOXETIN HYDROCHLORIDE 60 MG/1
60 CAPSULE, DELAYED RELEASE ORAL 2 TIMES DAILY
Status: DISCONTINUED | OUTPATIENT
Start: 2025-06-09 | End: 2025-06-11 | Stop reason: HOSPADM

## 2025-06-09 RX ORDER — IOPAMIDOL 755 MG/ML
75 INJECTION, SOLUTION INTRAVASCULAR
Status: DISCONTINUED | OUTPATIENT
Start: 2025-06-09 | End: 2025-06-11 | Stop reason: HOSPADM

## 2025-06-09 RX ORDER — POTASSIUM CHLORIDE 7.45 MG/ML
10 INJECTION INTRAVENOUS PRN
Status: DISCONTINUED | OUTPATIENT
Start: 2025-06-09 | End: 2025-06-11 | Stop reason: HOSPADM

## 2025-06-09 RX ORDER — MECLIZINE HYDROCHLORIDE 25 MG/1
25 TABLET ORAL 3 TIMES DAILY PRN
Status: DISCONTINUED | OUTPATIENT
Start: 2025-06-09 | End: 2025-06-11 | Stop reason: HOSPADM

## 2025-06-09 RX ORDER — CYCLOBENZAPRINE HCL 10 MG
10 TABLET ORAL NIGHTLY PRN
Status: DISCONTINUED | OUTPATIENT
Start: 2025-06-09 | End: 2025-06-11 | Stop reason: HOSPADM

## 2025-06-09 RX ORDER — GLUCAGON 1 MG/ML
1 KIT INJECTION PRN
Status: DISCONTINUED | OUTPATIENT
Start: 2025-06-09 | End: 2025-06-11 | Stop reason: HOSPADM

## 2025-06-09 RX ORDER — DEXTROSE MONOHYDRATE 100 MG/ML
INJECTION, SOLUTION INTRAVENOUS CONTINUOUS PRN
Status: DISCONTINUED | OUTPATIENT
Start: 2025-06-09 | End: 2025-06-11 | Stop reason: HOSPADM

## 2025-06-09 RX ORDER — EZETIMIBE 10 MG/1
10 TABLET ORAL DAILY
Status: DISCONTINUED | OUTPATIENT
Start: 2025-06-09 | End: 2025-06-11 | Stop reason: HOSPADM

## 2025-06-09 RX ORDER — NICOTINE 21 MG/24HR
1 PATCH, TRANSDERMAL 24 HOURS TRANSDERMAL DAILY
Status: DISCONTINUED | OUTPATIENT
Start: 2025-06-09 | End: 2025-06-10

## 2025-06-09 RX ORDER — ACETAMINOPHEN 650 MG/1
650 SUPPOSITORY RECTAL EVERY 6 HOURS PRN
Status: DISCONTINUED | OUTPATIENT
Start: 2025-06-09 | End: 2025-06-11 | Stop reason: HOSPADM

## 2025-06-09 RX ORDER — FAMOTIDINE 20 MG/1
20 TABLET, FILM COATED ORAL DAILY
Status: DISCONTINUED | OUTPATIENT
Start: 2025-06-09 | End: 2025-06-11 | Stop reason: HOSPADM

## 2025-06-09 RX ORDER — ACETAMINOPHEN 325 MG/1
650 TABLET ORAL EVERY 6 HOURS PRN
Status: DISCONTINUED | OUTPATIENT
Start: 2025-06-09 | End: 2025-06-11 | Stop reason: HOSPADM

## 2025-06-09 RX ORDER — HYDROXYZINE PAMOATE 25 MG/1
25 CAPSULE ORAL 3 TIMES DAILY PRN
Status: DISCONTINUED | OUTPATIENT
Start: 2025-06-09 | End: 2025-06-11 | Stop reason: HOSPADM

## 2025-06-09 RX ADMIN — FAMOTIDINE 20 MG: 20 TABLET, FILM COATED ORAL at 12:03

## 2025-06-09 RX ADMIN — Medication 10 ML: at 10:15

## 2025-06-09 RX ADMIN — ATENOLOL 25 MG: 25 TABLET ORAL at 12:03

## 2025-06-09 RX ADMIN — ENOXAPARIN SODIUM 40 MG: 100 INJECTION SUBCUTANEOUS at 09:59

## 2025-06-09 RX ADMIN — SODIUM CHLORIDE: 0.9 INJECTION, SOLUTION INTRAVENOUS at 22:08

## 2025-06-09 RX ADMIN — INSULIN LISPRO 2 UNITS: 100 INJECTION, SOLUTION INTRAVENOUS; SUBCUTANEOUS at 10:56

## 2025-06-09 RX ADMIN — INSULIN LISPRO 4 UNITS: 100 INJECTION, SOLUTION INTRAVENOUS; SUBCUTANEOUS at 12:48

## 2025-06-09 RX ADMIN — ACETAMINOPHEN 650 MG: 325 TABLET ORAL at 22:02

## 2025-06-09 RX ADMIN — INSULIN GLARGINE 60 UNITS: 100 INJECTION, SOLUTION SUBCUTANEOUS at 09:59

## 2025-06-09 RX ADMIN — SODIUM CHLORIDE: 0.9 INJECTION, SOLUTION INTRAVENOUS at 13:47

## 2025-06-09 RX ADMIN — DULOXETINE 60 MG: 60 CAPSULE, DELAYED RELEASE ORAL at 22:01

## 2025-06-09 RX ADMIN — INSULIN LISPRO 5 UNITS: 100 INJECTION, SOLUTION INTRAVENOUS; SUBCUTANEOUS at 10:56

## 2025-06-09 RX ADMIN — WATER 1000 MG: 1 INJECTION INTRAMUSCULAR; INTRAVENOUS; SUBCUTANEOUS at 03:05

## 2025-06-09 RX ADMIN — ACETAMINOPHEN 650 MG: 325 TABLET ORAL at 11:01

## 2025-06-09 RX ADMIN — VITAM B12 50 MCG: 100 TAB at 12:03

## 2025-06-09 RX ADMIN — SODIUM CHLORIDE 1000 ML: 0.9 INJECTION, SOLUTION INTRAVENOUS at 03:06

## 2025-06-09 RX ADMIN — EZETIMIBE 10 MG: 10 TABLET ORAL at 12:48

## 2025-06-09 RX ADMIN — INSULIN LISPRO 5 UNITS: 100 INJECTION, SOLUTION INTRAVENOUS; SUBCUTANEOUS at 17:41

## 2025-06-09 RX ADMIN — PREGABALIN 100 MG: 100 CAPSULE ORAL at 12:02

## 2025-06-09 RX ADMIN — DULOXETINE 60 MG: 60 CAPSULE, DELAYED RELEASE ORAL at 12:03

## 2025-06-09 RX ADMIN — ACETAMINOPHEN 650 MG: 325 TABLET ORAL at 03:05

## 2025-06-09 RX ADMIN — SODIUM CHLORIDE: 0.9 INJECTION, SOLUTION INTRAVENOUS at 05:25

## 2025-06-09 RX ADMIN — CEFEPIME 2000 MG: 2 INJECTION, POWDER, FOR SOLUTION INTRAVENOUS at 13:07

## 2025-06-09 RX ADMIN — Medication 10 ML: at 22:32

## 2025-06-09 RX ADMIN — PREGABALIN 100 MG: 100 CAPSULE ORAL at 22:01

## 2025-06-09 RX ADMIN — SODIUM ZIRCONIUM CYCLOSILICATE 10 G: 5 POWDER, FOR SUSPENSION ORAL at 05:24

## 2025-06-09 RX ADMIN — LISINOPRIL 20 MG: 20 TABLET ORAL at 12:02

## 2025-06-09 RX ADMIN — TAMSULOSIN HYDROCHLORIDE 0.4 MG: 0.4 CAPSULE ORAL at 12:03

## 2025-06-09 RX ADMIN — FERROUS SULFATE TAB 325 MG (65 MG ELEMENTAL FE) 325 MG: 325 (65 FE) TAB at 12:03

## 2025-06-09 ASSESSMENT — PAIN SCALES - GENERAL: PAINLEVEL_OUTOF10: 5

## 2025-06-09 ASSESSMENT — ENCOUNTER SYMPTOMS
VOMITING: 0
ABDOMINAL PAIN: 0
DIARRHEA: 0
BACK PAIN: 0
RHINORRHEA: 0
SORE THROAT: 0
CONSTIPATION: 0
NAUSEA: 0
COUGH: 0

## 2025-06-09 NOTE — ED PROVIDER NOTES
[facility] ORTHO TELE  EMERGENCY DEPARTMENT ENCOUNTER      Pt Name: Agatha Quinonez  MRN: [id number]  Birthdate 1960  Date of evaluation: 6/8/2025  Provider: Brian Gaines PA-C  12:25 AM EDT    CHIEF COMPLAINT       Chief Complaint   Patient presents with    Fatigue         HISTORY OF PRESENT ILLNESS   (Location/Symptom, Timing/Onset, Context/Setting, Quality, Duration, Modifying Factors, Severity)  Note limiting factors.   Agatha Quinonez is a 65 y.o. female past medical history of tobacco abuse, obesity, fibromyalgia, neuropathic pain, osteoporosis, GERD, DMII, anemia, MDD, tachycardia who presents to the emergency department via EMS due to her hands \"shaking\".  She states she is here today because her daughter made her come.  She is not sure why she is here.  She states that she drink 4 red bulls throughout the day prior to coming.  She denies chest pain, shortness of breath, fever, chills, dysuria, frequency, diarrhea, vision changes.  She does state that she has a headache.  She states that she may have a UTI but denies symptoms.      Per chart review patient had lab work done on 3/25/2025 at [facility] with a creatinine of 1.7    HPI    Nursing Notes were reviewed.    REVIEW OF SYSTEMS    (2-9 systems for level 4, 10 or more for level 5)     Review of Systems   Constitutional:  Negative for chills and fever.   HENT:  Negative for congestion, rhinorrhea and sore throat.    Respiratory:  Negative for cough.    Cardiovascular:  Negative for chest pain.   Gastrointestinal:  Negative for abdominal pain, diarrhea, nausea and vomiting.   Genitourinary:  Negative for dysuria.   Musculoskeletal:  Negative for back pain and myalgias.   Neurological:  Negative for headaches.       Except as noted above the remainder of the review of systems was reviewed and negative.       PAST MEDICAL HISTORY     Past Medical History:   Diagnosis Date    Arthritis     Arthritis     Atrophic kidney 12/23/2020     is not toxic-appearing.   HENT:      Head: Normocephalic and atraumatic.      Mouth/Throat:      Mouth: Mucous membranes are moist.   Eyes:      Extraocular Movements: Extraocular movements intact.      Conjunctiva/sclera: Conjunctivae normal.      Pupils: Pupils are equal, round, and reactive to light.   Cardiovascular:      Rate and Rhythm: Regular rhythm. Tachycardia present.      Heart sounds: Normal heart sounds. No murmur heard.  Pulmonary:      Effort: Pulmonary effort is normal.      Breath sounds: Normal breath sounds. No wheezing or rhonchi.   Abdominal:      General: Abdomen is flat. There is no distension.      Palpations: Abdomen is soft.      Tenderness: There is no abdominal tenderness. There is no guarding or rebound.   Musculoskeletal:         General: Normal range of motion.      Cervical back: Normal range of motion and neck supple.   Skin:     General: Skin is warm and dry.      Capillary Refill: Capillary refill takes less than 2 seconds.   Neurological:      General: No focal deficit present.      Mental Status: She is alert and oriented to person, place, and time.   Psychiatric:         Mood and Affect: Mood normal.         Behavior: Behavior normal.         DIAGNOSTIC RESULTS     EKG: All EKG's are interpreted by the Emergency Department Physician who either signs or Co-signs this chart in the absence of a cardiologist.    EKG my interpretation: Normal sinus rhythm, 93 bpm, QTc 497 ms, no STEMI    RADIOLOGY:   Non-plain film images such as CT, Ultrasound and MRI are read by the radiologist. Plain radiographic images are visualized and preliminarily interpreted by the emergency physician with the below findings:    Chest x-ray interpretation: No acute process    Interpretation per the Radiologist below, if available at the time of this note:    CT ABDOMEN PELVIS WO CONTRAST Additional Contrast? None   Final Result   1. No acute intra-abdominal or pelvic process.   2. Unchanged left renal  Glucose 244 (*)     POC Creatinine 1.6 (*)     Est, Glom Filt Rate 36 (*)     Calcium, Ionized 1.00 (*)     pH, Iglesia 7.485 (*)     pCO2, Ig[place]ia 30.4 (*)     HCO3, Venous 22.9 (*)     Hemoglobin 17.1 (*)     POC Hematocrit 50 (*)     All other components within normal limits   CULTURE, BLOOD 1   CULTURE, BLOOD 2   CULTURE, URINE   MAGNESIUM   LIPASE   BETA-HYDROXYBUTYRATE   ETHANOL   URINE DRUG SCREEN   LACTATE, SEPSIS   LACTATE, SEPSIS   COMPREHENSIVE METABOLIC PANEL W/ REFLEX TO MG FOR LOW K   CBC WITH AUTO DIFFERENTIAL   POCT EPOC BLOOD GAS, LACTIC ACID, ICA   POCT GLUCOSE   POCT GLUCOSE       All other labs were within normal range or not returned as of this dictation.    EMERGENCY DEPARTMENT COURSE and DIFFERENTIAL DIAGNOSIS/MDM:   Vitals:    Vitals:    06/09/25 0300 06/09/25 0315 06/09/25 0330 06/09/25 0509   BP: 124/62   106/60   Pulse: (!) 117 (!) 114 (!) 106 80   Resp: 26 (!) 33 (!) 31 16   Temp:    98.2 °F (36.8 °C)   TempSrc:    Oral   SpO2: 90% 94% 97% 99%   Weight:       Height:           Patient presents to the emergency department via EMS for change in mental status and concern for urinary tract infection.  Differential is broad as patient is not reliable historian.  NIHSS stroke scale was negative.  Low suspicion for acute CVA.  Patient was tachycardic, tachypneic, hypoxic, and febrile throughout her stay in the emergency department.  Patient meeting SIRS and sepsis criteria.  However source was not immediately known.  Patient with elevated creatinine however this is only slightly elevated from baseline.  Patient's potassium elevated at 5.6.  CBC is positive for leukocytosis.  Lactic acid was negative.  No severe sepsis.  Patient did have mild abdominal tenderness on exam.  Due to her being tachypneic and tachycardic a VBG was drawn.  This did not show acidosis.  Patient's beta hydroxybutyrate was negative and no ketones in the urine.  Low suspicion for DKA or HHS.  Throughout patient's stay patient

## 2025-06-09 NOTE — PROGRESS NOTES
[facility] OCCUPATIONAL THERAPY EVALUATION - ACUTE     NAME: Agatha Quinonez  : 1960 (65 y.o.)  MRN: [id number]  CODE STATUS: Full Code  Room: W266/W266-01    Date of Service: 2025    Patient Diagnosis(es): UTI (urinary tract infection) [N39.0]  Acute cystitis with hematuria [N30.01]  Sepsis with encephalopathy without septic shock, due to unspecified organism (HCC) [A41.9, R65.20, G93.41]   Patient Active Problem List    Diagnosis Date Noted    UTI (urinary tract infection) 2025    Nonadherence to medical treatment 2021    Hydroureteronephrosis - right, obstructive, worsening 2021    Osteoporosis with current pathological fracture with routine healing 2021    Fibromyalgia 2021    Neuropathic pain of lower extremity, left 2021    Atrophic kidney 2020    Hypomagnesemia 2020    Class 2 severe obesity due to excess calories with serious comorbidity and body mass index (BMI) of 36.0 to 36.9 in adult ([facility]) 2020    Tinea pedis of right foot 2020    Vitamin D deficiency 05/10/2020    Trigger finger, right ring finger 2020    Trigger finger, left middle finger 2020    GERD (gastroesophageal reflux disease) 2020    Type 2 diabetes mellitus with neurologic complication, with long-term current use of insulin ([facility]) 2020    Anemia 2020    Recurrent major depressive disorder, in partial remission 2020    Chronic musculoskeletal pain 2020    Chronic midline low back pain without sciatica 2020    Chronic neck pain 2020    Osteopenia 2020    Tobacco abuse 2020    Tachycardia 2020    Abnormal antinuclear antibody titer 2020    Tendinopathy 2020      Sepsis with encephalopathy without septic shock, due to unspecified organism (HCC)  Acute cystitis with hematuria    Past Medical History:   Diagnosis Date    Arthritis     Arthritis     Atrophic kidney 2020    Bulging

## 2025-06-09 NOTE — ED TRIAGE NOTES
Pt presents with generalized medical problem.  Pt states she is here because her daughter made her come.  Pt states her hands have been shaky.  Pt states she may have UTI.  Pt unable to give definitive reason for ER visit today

## 2025-06-09 NOTE — PLAN OF CARE
See OT evaluation for all goals and OT POC. Electronically signed by Iona Leyva OTR/L on 6/9/2025 at 12:58 PM

## 2025-06-09 NOTE — PROGRESS NOTES
Physical Therapy Med Surg Initial Assessment  Facility/Department: [address] ORTHO TELE  Room: [facility]/[place] [address]       NAME: Agatha Quinonez  : 1960 (65 y.o.)  MRN: [id number]  CODE STATUS: Full Code    Date of Service: 2025    Patient Diagnosis(es): UTI (urinary tract infection) [N39.0]  Acute cystitis with hematuria [N30.01]  Sepsis with encephalopathy without septic shock, due to unspecified organism (HCC) [A41.9, R65.20, G93.41]   Chief Complaint   Patient presents with    Fatigue     Patient Active Problem List    Diagnosis Date Noted    UTI (urinary tract infection) 2025    Nonadherence to medical treatment 2021    Hydroureteronephrosis - right, obstructive, worsening 2021    Osteoporosis with current pathological fracture with routine healing 2021    Fibromyalgia 2021    Neuropathic pain of lower extremity, left 2021    Atrophic kidney 2020    Hypomagnesemia 2020    Class 2 severe obesity due to excess calories with serious comorbidity and body mass index (BMI) of 36.0 to 36.9 in adult ([facility]) 2020    Tinea pedis of right foot 2020    Vitamin D deficiency 05/10/2020    Trigger finger, right ring finger 2020    Trigger finger, left middle finger 2020    GERD (gastroesophageal reflux disease) 2020    Type 2 diabetes mellitus with neurologic complication, with long-term current use of insulin ([facility]) 2020    Anemia 2020    Recurrent major depressive disorder, in partial remission 2020    Chronic musculoskeletal pain 2020    Chronic midline low back pain without sciatica 2020    Chronic neck pain 2020    Osteopenia 2020    Tobacco abuse 2020    Tachycardia 2020    Abnormal antinuclear antibody titer 2020    Tendinopathy 2020        Past Medical History:   Diagnosis Date    Arthritis     Arthritis     Atrophic kidney 2020    Bulging lumbar disc     lower back     ambulator, with or without device (B canes in community)  Prior Level of Assist for Transfers: Independent  Active : No  Patient's  Info: Transportation through insurance  Occupation: Retired  Additional Comments: \"Gotta take the laundry someplace\"- family manages it on the steps, motorized cart in chair    OBJECTIVE:   Vision  Vision: Within Functional Limits  Hearing: Within functional limits    Cognition:  Overall Orientation Status: Within Functional Limits  Follows Commands: Within Functional Limits    Observation/Palpation  Observation: No acute distress noted. Pt pleasant and motivated. Shivering throughout assessment.    ROM:  AROM: Within functional limits  PROM: Within functional limits    Strength:  Strength: Generally decreased, functional    Neuro:  Balance  Sitting - Static: Good  Sitting - Dynamic: Good  Standing - Static: Fair;+  Standing - Dynamic: Fair                      Tone: Normal  Coordination: Generally decreased, functional    Sensation: Intact    Bed mobility  Supine to Sit: Minimal assistance  Sit to Supine: Minimal assistance  Bed Mobility Comments: Assist to lift trunk and lift R LE into bed. Slow to complete. Verbal cues required for follow through.    Transfers  Sit to Stand: Supervision or touching assistance  Stand to Sit: Supervision or touching assistance  Bed to Chair: Supervision or touching assistance  Comment: Slow to complete. Leans FF onto B canes for support. Increased time required.    Ambulation  Surface: Level tile  Device:  (B SPC)  Assistance: Partial/Moderate assistance  Quality of Gait: Steadying assist provided for safety. Pt with slow, meandering gait. FF posture. Minimal foot clearance bilaterally.  Distance: 30ft with turn                   Activity Tolerance  Activity Tolerance: Patient limited by endurance    Patient Education  Education Given To: Patient  Education Provided: Role of Therapy;Plan of Care  Education Method: Verbal  Education Outcome:

## 2025-06-09 NOTE — PLAN OF CARE
Problem: Skin/Tissue Integrity  Goal: Skin integrity remains intact  Description: 1.  Monitor for areas of redness and/or skin breakdown2.  Assess vascular access sites hourly3.  Every 4-6 hours minimum:  Change oxygen saturation probe site4.  Every 4-6 hours:  If on nasal continuous positive airway pressure, respiratory therapy assess nares and determine need for appliance change or resting period  Outcome: Progressing     Problem: Chronic Conditions and Co-morbidities  Goal: Patient's chronic conditions and co-morbidity symptoms are monitored and maintained or improved  Outcome: Progressing     Problem: Discharge Planning  Goal: Discharge to home or other facility with appropriate resources  Outcome: Progressing     Problem: Safety - Adult  Goal: Free from fall injury  Outcome: Progressing     Problem: Neurosensory - Adult  Goal: Achieves stable or improved neurological status  Outcome: Progressing  Goal: Achieves maximal functionality and self care  Outcome: Progressing     Problem: Musculoskeletal - Adult  Goal: Return mobility to safest level of function  Outcome: Progressing  Goal: Maintain proper alignment of affected body part  Outcome: Progressing  Goal: Return ADL status to a safe level of function  Outcome: Progressing     Problem: Genitourinary - Adult  Goal: Absence of urinary retention  Outcome: Progressing     Problem: Infection - Adult  Goal: Absence of infection at discharge  Outcome: Progressing  Goal: Absence of infection during hospitalization  Outcome: Progressing  Goal: Absence of fever/infection during anticipated neutropenic period  Outcome: Progressing     Problem: Metabolic/Fluid and Electrolytes - Adult  Goal: Electrolytes maintained within normal limits  Outcome: Progressing  Goal: Hemodynamic stability and optimal renal function maintained  Outcome: Progressing  Goal: Glucose maintained within prescribed range  Outcome: Progressing

## 2025-06-09 NOTE — PROGRESS NOTES
Renal Adjustment Per Protocol: famotidine 20 mg BID changed to 20 mg daily based on    Recent Labs     06/09/25  0120   CREATININE 1.6*   Estimated Creatinine Clearance: 35 mL/min (A) (based on SCr of 1.6 mg/dL (H)).  .     Sneha Hodge, PharmD, BCPS

## 2025-06-09 NOTE — PLAN OF CARE
Problem: Skin/Tissue Integrity  Goal: Skin integrity remains intact  Description: 1.  Monitor for areas of redness and/or skin breakdown2.  Assess vascular access sites hourly3.  Every 4-6 hours minimum:  Change oxygen saturation probe site4.  Every 4-6 hours:  If on nasal continuous positive airway pressure, respiratory therapy assess nares and determine need for appliance change or resting period  6/9/2025 1115 by Monica Michaud RN  Outcome: Progressing     Problem: Chronic Conditions and Co-morbidities  Goal: Patient's chronic conditions and co-morbidity symptoms are monitored and maintained or improved  6/9/2025 1115 by Monica Michaud RN  Outcome: Progressing     Problem: Discharge Planning  Goal: Discharge to home or other facility with appropriate resources  6/9/2025 1115 by Monica Michaud RN  Outcome: Progressing     Problem: Safety - Adult  Goal: Free from fall injury  6/9/2025 1115 by Monica Michaud RN  Outcome: Progressing     Problem: Neurosensory - Adult  Goal: Achieves stable or improved neurological status  6/9/2025 1115 by Monica Michaud RN  Outcome: Progressing     Problem: Neurosensory - Adult  Goal: Achieves maximal functionality and self care  6/9/2025 1115 by Monica Michaud RN  Outcome: Progressing     Problem: Musculoskeletal - Adult  Goal: Return mobility to safest level of function  6/9/2025 1115 by Monica Michaud RN  Outcome: Progressing     Problem: Musculoskeletal - Adult  Goal: Maintain proper alignment of affected body part  6/9/2025 1115 by Monica Michaud RN  Outcome: Progressing     Problem: Musculoskeletal - Adult  Goal: Return ADL status to a safe level of function  6/9/2025 1115 by Monica Michaud RN  Outcome: Progressing     Problem: Genitourinary - Adult  Goal: Absence of urinary retention  6/9/2025 1115 by Monica Michaud RN  Outcome: Progressing     Problem: Infection - Adult  Goal: Absence of infection at discharge  6/9/2025 1115 by

## 2025-06-09 NOTE — H&P
DEPARTMENT OF HOSPITAL MEDICINE    HISTORY AND PHYSICAL EXAM    PATIENT NAME:  Agatha Quinonez    MRN:  [id number]  SERVICE DATE:  2025   SERVICE TIME:  4:12 AM    Primary Care Physician: Tim Rincon MD         SUBJECTIVE  CHIEF COMPLAINT:  Fatigue       HPI:   This is a 65-year-old female with a past medical history of diabetes, chronic kidney disease, frequent UTIs, chronic pain, who was brought to the emergency room for evaluation of altered mental status.  According to the patient's daughter, she was in her usual state of health until today when she became more lucid.  The daughter also states that she was having rigors and felt warm to touch.  Because of her worsening confusion, EMS was called and she was brought to the emergency department.  CT of the head showed showed no acute intracranial abnormality.  Urine toxicology was negative.  Labs showed worsening renal function as well as hyperkalemia.  White count was elevated at 16,000 with a left shift.  UA findings consistent with a UTI.  Upon my evaluation, she is lethargic but easily arousable.  She knows she is in the hospital but does not know what hospital and how she got here.  Only other complaint is back pain.    PAST MEDICAL HISTORY:    Past Medical History:   Diagnosis Date    Arthritis     Arthritis     Atrophic kidney 2020    Bulging lumbar disc     lower back    Chronic back pain     Chronic headaches     Chronic kidney disease     Depression     Diabetes mellitus (HCC)     Fibromyalgia     Fibromyalgia     Hydroureteronephrosis - right, obstructive, worsening 2021    Hypomagnesemia 2020    Nonadherence to medical treatment 2021    Osteoporosis with current pathological fracture with routine healing 2021    Recurrent major depressive disorder, in partial remission 3/5/2020     PAST SURGICAL HISTORY:    Past Surgical History:   Procedure Laterality Date     SECTION      ,1984,1985,    FINGER  1.021 1.005 - 1.030    Blood, Urine TRACE (A) Negative    pH, Urine 6.5 5.0 - 9.0    Protein, UA 30 (A) Negative mg/dL    Urobilinogen, Urine 0.2 <2.0 E.U./dL    Nitrite, Urine Negative Negative    Leukocyte Esterase, Urine MODERATE (A) Negative    Urine Reflex to Culture Yes    Microscopic Urinalysis    Collection Time: 06/09/25  3:51 AM   Result Value Ref Range    Bacteria, UA MANY (A) Negative /HPF    Hyaline Casts, UA 0-1 0 - 5 /HPF    WBC, UA >100 (H) 0 - 5 /HPF    RBC, UA 3-5 (A) 0 - 5 /HPF    Epithelial Cells, UA 0-2 0 - 5 /HPF       IMAGING:   CT HEAD WO CONTRAST  Result Date: 6/9/2025  No acute intracranial abnormality.     CT ABDOMEN PELVIS WO CONTRAST Additional Contrast? None  Result Date: 6/9/2025  1. No acute intra-abdominal or pelvic process. 2. Unchanged left renal atrophy and left nephrolithiasis. 3. Nonobstructing small burden right mid pole nephrolithiasis.     XR CHEST PORTABLE  Result Date: 6/9/2025  Stable chest series.  No acute cardiopulmonary pathology seen.        VTE Prophylaxis: low molecular weight heparin -  start     ASSESSMENT AND PLAN    Principal Problem:  UTI (urinary tract infection)  Acute on chronic kidney injury  Hyperkalemia  Metabolic encephalopathy-secondary to sepsis  Type 2 diabetes  COPD    Plan:     Admit to [facility] with telemetry  Will start on Rocephin, follow-up urine culture identification and sensitivity.  Normal saline at 125 cc/h.  Strict input and output monitoring.  Monitor electrolytes.  Accu-Cheks per floor protocol.  Insulin sliding scale coverage.  Hypoglycemia protocol.  Treat hyperkalemia  DuoNebs as needed for wheezing and shortness of breath.  O2 supplementation to maintain O2 sat greater than 90%.  She is full code        Plan of care discussed with: family    SIGNATURE: Lily Dumont MD  DATE: June 9, 2025  TIME: 4:12 AM     Lily Dumont MD - supervising physician

## 2025-06-09 NOTE — PROGRESS NOTES
Pt unable to state her home medications. Says she uses [facility] pharmacy in [place].

## 2025-06-09 NOTE — CARE COORDINATION
Case Management Assessment  Initial Evaluation    Date/Time of Evaluation: 6/9/2025 2:24 PM  Assessment Completed by: AGGIE Dalton    If patient is discharged prior to next notation, then this note serves as note for discharge by case management.    Patient Name: Agatha Quinonez                   YOB: 1960  Diagnosis: UTI (urinary tract infection) [N39.0]  Acute cystitis with hematuria [N30.01]  Sepsis with encephalopathy without septic shock, due to unspecified organism (HCC) [A41.9, R65.20, G93.41]                   Date / Time: 6/8/2025 11:42 PM    Patient Admission Status: Inpatient   Readmission Risk (Low < 19, Mod (19-27), High > 27): Readmission Risk Score: 13.7    Current PCP: Tim Rincon MD  PCP verified by CM? Yes    Chart Reviewed: Yes      History Provided by: Patient  Patient Orientation: Alert and Oriented, Person, Place, Situation    Patient Cognition: Alert    Hospitalization in the last 30 days (Readmission):  No    If yes, Readmission Assessment in  Navigator will be completed.    Advance Directives:      Code Status: Full Code   Patient's Primary Decision Maker is: Legal Next of Kin      Discharge Planning:    Patient lives with: Children Type of Home: Apartment  Primary Care Giver: Self  Patient Support Systems include: Children   Current Financial resources:    Current community resources:    Current services prior to admission: None            Current DME:              Type of Home Care services:  IV Therapy    ADLS  Prior functional level: Assistance with the following:, Shopping, Housework  Current functional level: Assistance with the following:, Mobility, Shopping, Housework, Cooking, Bathing    PT AM-PAC: 17 /24  OT AM-PAC: 15 /24    Family can provide assistance at DC: Yes  Would you like Case Management to discuss the discharge plan with any other family members/significant others, and if so, who? No  Plans to Return to Present Housing: Yes  Other

## 2025-06-09 NOTE — PROGRESS NOTES
[facility]   Pharmacy Dose Adjustment Per Protocol:  Cefepime Extended Interval Interchange    Agatha Quinonez is a 65 y.o. female.     The following ordered dose of Cefepime has been changed to optimize its pharmacodynamic profile per [facility] pharmacy policy approved by [facility].    Recent Labs     06/09/25  0033 06/09/25  0034 06/09/25  0120   CREATININE 1.84*  --  1.6*   BUN 26*  --   --    WBC  --  16.9*  --      .  Height: 154.9 cm (5' 1\"), Weight - Scale: 83.6 kg (184 lb 3.2 oz), Body mass index is 34.8 kg/m².  Estimated Creatinine Clearance: 35 mL/min (A) (based on SCr of 1.6 mg/dL (H)).    Ordered Dose    x 1 gm IV every 12 hrs  (30 minute infusion)    New Dose    Cefepime - Extended Infusion (4-hour infusion) - Preferred Dosing Strategy    Renal Function (CrCl mL/min) >= 60 30 - 59 11 - 29 <= 10, HD PD CRRT   All indications - Loading dose of 2000 milligrams x 1 over 30 minutes or via IV push. Maintenance dose  should begin at the next regularly scheduled dosing interval based on indication/renal function.    Intra-abdominal infections, Skin and soft tissue infections, Urinary tract infections  2000mg q12h [] 2000mg q24h [] 1000mg q24h [] 500mg q24h []  1000mg q24h [] 2000mg q24h^ []    Bacteremia, CNS infections, Cystic fibrosis, Diabetic foot infections, Endocarditis, Febrile neutropenia, Healthcare associated infections, Osteomyelitis/joint infections, Pneumonia, Sepsis, BMI > 40*  2000mg q8h [] 2000mg q12h [x] 1000mg q12h [] 1000mg q24h []  1000mg q24h [] 2000mg q12h† []   *Consider 2000mg q12h for indication of Urinary tract infections in BMI > 40. Adjust for decreased renal function.     Pharmacists should be contacted for issues concerning drug compatibility with multiple IV medications.  All doses will be prepared using 50ml bag to be infused over 4-hours at a rate of 12.5ml/hr.    Thank You,  Tamera Nino, [facility]  6/9/2025 12:40 PM

## 2025-06-09 NOTE — FLOWSHEET NOTE
Home medications reviewed with Pharmacist at [facility] and also with Grandchild Richard who is pts medical POA. Unsure of last dose of meds per Richard sometimes \"pt forgets to take medication.\"

## 2025-06-09 NOTE — ACP (ADVANCE CARE PLANNING)
Advance Care Planning   Healthcare Decision Maker:    Primary Decision Maker: Luana Mehta - Child - [phone]    Secondary Decision Maker: Richard Servin - Grandchild - [phone]    Click here to complete Healthcare Decision Makers including selection of the Healthcare Decision Maker Relationship (ie \"Primary\").  Today we documented Decision Maker(s) consistent with Legal Next of Kin hierarchy.       If the relationship to the patient does NOT follow our state's Next of Kin hierarchy, the patient MUST complete an ACP Document to allow him/her to act on the patient's behalf. :

## 2025-06-10 LAB
ALBUMIN SERPL-MCNC: 3 G/DL (ref 3.5–4.6)
ALP SERPL-CCNC: 69 U/L (ref 40–130)
ALT SERPL-CCNC: 17 U/L (ref 0–33)
ANION GAP SERPL CALCULATED.3IONS-SCNC: 11 MEQ/L (ref 9–15)
AST SERPL-CCNC: 16 U/L (ref 0–35)
BACTERIA UR CULT: ABNORMAL
BACTERIA UR CULT: ABNORMAL
BASOPHILS # BLD: 0.1 K/UL (ref 0–0.2)
BASOPHILS NFR BLD: 0.5 %
BILIRUB SERPL-MCNC: 0.4 MG/DL (ref 0.2–0.7)
BUN SERPL-MCNC: 23 MG/DL (ref 8–23)
CALCIUM SERPL-MCNC: 8.6 MG/DL (ref 8.5–9.9)
CHLORIDE SERPL-SCNC: 106 MEQ/L (ref 95–107)
CO2 SERPL-SCNC: 19 MEQ/L (ref 20–31)
CREAT SERPL-MCNC: 1.62 MG/DL (ref 0.5–0.9)
EOSINOPHIL # BLD: 0.1 K/UL (ref 0–0.7)
EOSINOPHIL NFR BLD: 1.1 %
ERYTHROCYTE [DISTWIDTH] IN BLOOD BY AUTOMATED COUNT: 15.9 % (ref 11.5–14.5)
GLOBULIN SER CALC-MCNC: 3.8 G/DL (ref 2.3–3.5)
GLUCOSE BLD-MCNC: 115 MG/DL (ref 70–99)
GLUCOSE BLD-MCNC: 130 MG/DL (ref 70–99)
GLUCOSE BLD-MCNC: 187 MG/DL (ref 70–99)
GLUCOSE BLD-MCNC: 191 MG/DL (ref 70–99)
GLUCOSE SERPL-MCNC: 114 MG/DL (ref 70–99)
HCT VFR BLD AUTO: 39.9 % (ref 37–47)
HGB BLD-MCNC: 13 G/DL (ref 12–16)
LYMPHOCYTES # BLD: 1.3 K/UL (ref 1–4.8)
LYMPHOCYTES NFR BLD: 12.3 %
MCH RBC QN AUTO: 27.8 PG (ref 27–31.3)
MCHC RBC AUTO-ENTMCNC: 32.6 % (ref 33–37)
MCV RBC AUTO: 85.4 FL (ref 79.4–94.8)
MONOCYTES # BLD: 1.3 K/UL (ref 0.2–0.8)
MONOCYTES NFR BLD: 12.4 %
NEUTROPHILS # BLD: 7.7 K/UL (ref 1.4–6.5)
NEUTS SEG NFR BLD: 73.4 %
ORGANISM: ABNORMAL
PERFORMED ON: ABNORMAL
PLATELET # BLD AUTO: 214 K/UL (ref 130–400)
POTASSIUM SERPL-SCNC: 4 MEQ/L (ref 3.4–4.9)
PROT SERPL-MCNC: 6.8 G/DL (ref 6.3–8)
RBC # BLD AUTO: 4.67 M/UL (ref 4.2–5.4)
SODIUM SERPL-SCNC: 136 MEQ/L (ref 135–144)
WBC # BLD AUTO: 10.5 K/UL (ref 4.8–10.8)

## 2025-06-10 PROCEDURE — 2500000003 HC RX 250 WO HCPCS: Performed by: INTERNAL MEDICINE

## 2025-06-10 PROCEDURE — 6360000002 HC RX W HCPCS: Performed by: STUDENT IN AN ORGANIZED HEALTH CARE EDUCATION/TRAINING PROGRAM

## 2025-06-10 PROCEDURE — 6360000002 HC RX W HCPCS: Performed by: INTERNAL MEDICINE

## 2025-06-10 PROCEDURE — 2700000000 HC OXYGEN THERAPY PER DAY

## 2025-06-10 PROCEDURE — 85025 COMPLETE CBC W/AUTO DIFF WBC: CPT

## 2025-06-10 PROCEDURE — 1210000000 HC MED SURG R&B

## 2025-06-10 PROCEDURE — 97116 GAIT TRAINING THERAPY: CPT

## 2025-06-10 PROCEDURE — 6370000000 HC RX 637 (ALT 250 FOR IP): Performed by: STUDENT IN AN ORGANIZED HEALTH CARE EDUCATION/TRAINING PROGRAM

## 2025-06-10 PROCEDURE — 6370000000 HC RX 637 (ALT 250 FOR IP): Performed by: INTERNAL MEDICINE

## 2025-06-10 PROCEDURE — 80053 COMPREHEN METABOLIC PANEL: CPT

## 2025-06-10 PROCEDURE — 2580000003 HC RX 258: Performed by: STUDENT IN AN ORGANIZED HEALTH CARE EDUCATION/TRAINING PROGRAM

## 2025-06-10 PROCEDURE — 36415 COLL VENOUS BLD VENIPUNCTURE: CPT

## 2025-06-10 PROCEDURE — 97535 SELF CARE MNGMENT TRAINING: CPT

## 2025-06-10 RX ORDER — NICOTINE 21 MG/24HR
1 PATCH, TRANSDERMAL 24 HOURS TRANSDERMAL DAILY
Status: DISCONTINUED | OUTPATIENT
Start: 2025-06-10 | End: 2025-06-11 | Stop reason: HOSPADM

## 2025-06-10 RX ADMIN — CEFEPIME 2000 MG: 2 INJECTION, POWDER, FOR SOLUTION INTRAVENOUS at 12:36

## 2025-06-10 RX ADMIN — TAMSULOSIN HYDROCHLORIDE 0.4 MG: 0.4 CAPSULE ORAL at 09:24

## 2025-06-10 RX ADMIN — PREGABALIN 100 MG: 100 CAPSULE ORAL at 09:25

## 2025-06-10 RX ADMIN — ENOXAPARIN SODIUM 40 MG: 100 INJECTION SUBCUTANEOUS at 09:23

## 2025-06-10 RX ADMIN — INSULIN LISPRO 2 UNITS: 100 INJECTION, SOLUTION INTRAVENOUS; SUBCUTANEOUS at 22:51

## 2025-06-10 RX ADMIN — CEFEPIME 2000 MG: 2 INJECTION, POWDER, FOR SOLUTION INTRAVENOUS at 01:16

## 2025-06-10 RX ADMIN — Medication 3 MG: at 22:46

## 2025-06-10 RX ADMIN — LISINOPRIL 20 MG: 20 TABLET ORAL at 09:25

## 2025-06-10 RX ADMIN — VITAM B12 50 MCG: 100 TAB at 09:25

## 2025-06-10 RX ADMIN — DULOXETINE 60 MG: 60 CAPSULE, DELAYED RELEASE ORAL at 22:46

## 2025-06-10 RX ADMIN — DULOXETINE 60 MG: 60 CAPSULE, DELAYED RELEASE ORAL at 09:24

## 2025-06-10 RX ADMIN — INSULIN LISPRO 2 UNITS: 100 INJECTION, SOLUTION INTRAVENOUS; SUBCUTANEOUS at 17:45

## 2025-06-10 RX ADMIN — PANTOPRAZOLE SODIUM 40 MG: 40 TABLET, DELAYED RELEASE ORAL at 05:26

## 2025-06-10 RX ADMIN — EZETIMIBE 10 MG: 10 TABLET ORAL at 09:24

## 2025-06-10 RX ADMIN — Medication 10 ML: at 09:26

## 2025-06-10 RX ADMIN — INSULIN LISPRO 5 UNITS: 100 INJECTION, SOLUTION INTRAVENOUS; SUBCUTANEOUS at 17:45

## 2025-06-10 RX ADMIN — FERROUS SULFATE TAB 325 MG (65 MG ELEMENTAL FE) 325 MG: 325 (65 FE) TAB at 09:24

## 2025-06-10 RX ADMIN — ATENOLOL 25 MG: 25 TABLET ORAL at 09:25

## 2025-06-10 RX ADMIN — INSULIN LISPRO 5 UNITS: 100 INJECTION, SOLUTION INTRAVENOUS; SUBCUTANEOUS at 12:36

## 2025-06-10 RX ADMIN — Medication 10 ML: at 22:42

## 2025-06-10 RX ADMIN — INSULIN LISPRO 5 UNITS: 100 INJECTION, SOLUTION INTRAVENOUS; SUBCUTANEOUS at 09:23

## 2025-06-10 RX ADMIN — FAMOTIDINE 20 MG: 20 TABLET, FILM COATED ORAL at 09:25

## 2025-06-10 RX ADMIN — INSULIN GLARGINE 60 UNITS: 100 INJECTION, SOLUTION SUBCUTANEOUS at 09:23

## 2025-06-10 RX ADMIN — ACETAMINOPHEN 650 MG: 325 TABLET ORAL at 17:45

## 2025-06-10 RX ADMIN — PREGABALIN 100 MG: 100 CAPSULE ORAL at 22:46

## 2025-06-10 ASSESSMENT — PAIN DESCRIPTION - ORIENTATION: ORIENTATION: ANTERIOR;LOWER

## 2025-06-10 ASSESSMENT — PAIN DESCRIPTION - DESCRIPTORS: DESCRIPTORS: ACHING

## 2025-06-10 ASSESSMENT — PAIN SCALES - GENERAL: PAINLEVEL_OUTOF10: 4

## 2025-06-10 ASSESSMENT — PAIN DESCRIPTION - LOCATION: LOCATION: HEAD;BACK

## 2025-06-10 NOTE — PROGRESS NOTES
Physical Therapy Med Surg Daily Treatment Note  Facility/Department: [address] ORTHO TELE  Room: [facility] [address]       NAME: Agatha Quinonez  : 1960 (65 y.o.)  MRN: [id number]  CODE STATUS: Full Code    Date of Service: 6/10/2025    Patient Diagnosis(es): UTI (urinary tract infection) [N39.0]  Acute cystitis with hematuria [N30.01]  Sepsis with encephalopathy without septic shock, due to unspecified organism ([facility]) [A41.9, R65.20, G93.41]   Chief Complaint   Patient presents with    Fatigue     Patient Active Problem List    Diagnosis Date Noted    UTI (urinary tract infection) 2025    Nonadherence to medical treatment 2021    Hydroureteronephrosis - right, obstructive, worsening 2021    Osteoporosis with current pathological fracture with routine healing 2021    Fibromyalgia 2021    Neuropathic pain of lower extremity, left 2021    Atrophic kidney 2020    Hypomagnesemia 2020    Class 2 severe obesity due to excess calories with serious comorbidity and body mass index (BMI) of 36.0 to 36.9 in adult ([facility]) 2020    Tinea pedis of right foot 2020    Vitamin D deficiency 05/10/2020    Trigger finger, right ring finger 2020    Trigger finger, left middle finger 2020    GERD (gastroesophageal reflux disease) 2020    Type 2 diabetes mellitus with neurologic complication, with long-term current use of insulin ([facility]) 2020    Anemia 2020    Recurrent major depressive disorder, in partial remission 2020    Chronic musculoskeletal pain 2020    Chronic midline low back pain without sciatica 2020    Chronic neck pain 2020    Osteopenia 2020    Tobacco abuse 2020    Tachycardia 2020    Abnormal antinuclear antibody titer 2020    Tendinopathy 2020        Past Medical History:   Diagnosis Date    Arthritis     Arthritis     Atrophic kidney 2020    Bulging lumbar disc     lower back     Chronic back pain     Chronic headaches     Chronic kidney disease     Depression     Diabetes mellitus (HCC)     Fibromyalgia     Fibromyalgia     Hydroureteronephrosis - right, obstructive, worsening 2021    Hypomagnesemia 2020    Nonadherence to medical treatment 2021    Osteoporosis with current pathological fracture with routine healing 2021    Recurrent major depressive disorder, in partial remission 3/5/2020     Past Surgical History:   Procedure Laterality Date     SECTION      ,,,    FINGER TRIGGER RELEASE Left 2020    TRIGGER RELEASE MIDDLE FINGER AND RING FINGER LEFT HAND performed by Kanwal Vincent MD at [facility]    FINGER TRIGGER RELEASE Right 11/3/2020    RELEASE OF RIGHT RING FINGER TRIGGER FINGER performed by Kanwal Vincent MD at [facility]    KIDNEY STONE SURGERY      kidney stone removal        Chart Reviewed: Yes  Family/Caregiver Present: No    Restrictions:  Restrictions/Precautions: Fall Risk    SUBJECTIVE:   Subjective: Pt agreeable to tx    Pain   5/10 in legs stated they always hurt    OBJECTIVE:        Bed mobility  Supine to Sit: Stand by assistance  Sit to Supine: Stand by assistance  Bed Mobility Comments: Pt able to transfer into bed without assistance    Transfers  Sit to Stand: Supervision or touching assistance  Stand to Sit: Supervision or touching assistance    Ambulation  Surface: Level tile  Device: Rolling Walker (B SPC)  Assistance: Minimal assistance (touch assistance)  Quality of Gait: amb with B SPC with slow, unsteady gait, FF posture. With WW improved steadiness and improved posture and foot clearance  Distance: 30ft x 2       Activity Tolerance  Activity Tolerance: Patient tolerated treatment well          ASSESSMENT   Body Structures, Functions, Activity Limitations Requiring Skilled Therapeutic Intervention: Decreased functional mobility ;Decreased ADL status;Decreased endurance;Decreased safe

## 2025-06-10 NOTE — PLAN OF CARE
Problem: Skin/Tissue Integrity  Goal: Skin integrity remains intact  Description: 1.  Monitor for areas of redness and/or skin breakdown2.  Assess vascular access sites hourly3.  Every 4-6 hours minimum:  Change oxygen saturation probe site4.  Every 4-6 hours:  If on nasal continuous positive airway pressure, respiratory therapy assess nares and determine need for appliance change or resting period  6/9/2025 2216 by Rosita Hamilton, RN  Outcome: Progressing  6/9/2025 1115 by Monica Michaud, RN  Outcome: Progressing     Problem: Safety - Adult  Goal: Free from fall injury  6/9/2025 2216 by Rosita Hamilton, RN  Outcome: Progressing  6/9/2025 1115 by Monica Michaud, RN  Outcome: Progressing

## 2025-06-10 NOTE — PLAN OF CARE
Problem: Skin/Tissue Integrity  Goal: Skin integrity remains intact  Description: 1.  Monitor for areas of redness and/or skin breakdown2.  Assess vascular access sites hourly3.  Every 4-6 hours minimum:  Change oxygen saturation probe site4.  Every 4-6 hours:  If on nasal continuous positive airway pressure, respiratory therapy assess nares and determine need for appliance change or resting period  6/10/2025 1147 by Monica Michaud RN  Outcome: Progressing     Problem: Chronic Conditions and Co-morbidities  Goal: Patient's chronic conditions and co-morbidity symptoms are monitored and maintained or improved  6/10/2025 1147 by Monica Michaud RN  Outcome: Progressing     Problem: Discharge Planning  Goal: Discharge to home or other facility with appropriate resources  6/10/2025 1147 by Monica Michaud RN  Outcome: Progressing     Problem: Safety - Adult  Goal: Free from fall injury  6/10/2025 1147 by Monica Michaud RN  Outcome: Progressing     Problem: Neurosensory - Adult  Goal: Achieves stable or improved neurological status  6/10/2025 1147 by Monica Michaud RN  Outcome: Progressing     Problem: Neurosensory - Adult  Goal: Achieves maximal functionality and self care  6/10/2025 1147 by Monica Michaud RN  Outcome: Progressing     Problem: Musculoskeletal - Adult  Goal: Return mobility to safest level of function  6/10/2025 1147 by Monica Michaud RN  Outcome: Progressing     Problem: Musculoskeletal - Adult  Goal: Maintain proper alignment of affected body part  6/10/2025 1147 by Monica Michaud RN  Outcome: Progressing     Problem: Musculoskeletal - Adult  Goal: Return ADL status to a safe level of function  6/10/2025 1147 by Monica Michaud RN  Outcome: Progressing     Problem: Genitourinary - Adult  Goal: Absence of urinary retention  6/10/2025 1147 by Monica Michaud RN  Outcome: Progressing     Problem: Infection - Adult  Goal: Absence of infection at discharge  6/10/2025  1147 by Monica Michaud RN  Outcome: Progressing     Problem: Infection - Adult  Goal: Absence of infection during hospitalization  6/10/2025 1147 by Monica Michaud RN  Outcome: Progressing     Problem: Infection - Adult  Goal: Absence of fever/infection during anticipated neutropenic period  6/10/2025 1147 by Monica Michaud RN  Outcome: Progressing     Problem: Metabolic/Fluid and Electrolytes - Adult  Goal: Electrolytes maintained within normal limits  6/10/2025 1147 by Monica Michaud RN  Outcome: Progressing     Problem: Metabolic/Fluid and Electrolytes - Adult  Goal: Hemodynamic stability and optimal renal function maintained  6/10/2025 1147 by Monica Michaud RN  Outcome: Progressing    Problem: Metabolic/Fluid and Electrolytes - Adult  Goal: Glucose maintained within prescribed range  6/10/2025 1147 by Monica Michaud RN  Outcome: Progressing

## 2025-06-10 NOTE — PROGRESS NOTES
[facility] Hospitalist Progress Note    Admitting Date and Time: 6/8/2025 11:42 PM      Subjective:    No acute events overnight with exception of fever last night. Patient appears much improved today. She has no complaints and is much more alert. No fevers this morning       ROS: denies fever, chills, cp, sob, n/v, HA unless stated above.       sodium chloride flush  5-40 mL IntraVENous 2 times per day    enoxaparin  40 mg SubCUTAneous Daily    nicotine  1 patch TransDERmal Daily    insulin lispro  0-8 Units SubCUTAneous 4x Daily AC & HS    insulin glargine  60 Units SubCUTAneous QAM    insulin lispro  5 Units SubCUTAneous TID WC    tamsulosin  0.4 mg Oral Daily    atenolol  25 mg Oral Daily    DULoxetine  60 mg Oral BID    pantoprazole  40 mg Oral QAM AC    ezetimibe  10 mg Oral Daily    famotidine  20 mg Oral Daily    ferrous sulfate  325 mg Oral Daily    lisinopril  20 mg Oral Daily    pregabalin  100 mg Oral BID    vitamin B-12  50 mcg Oral Daily    cefepime  2,000 mg IntraVENous Q12H     iopamidol, 75 mL, ONCE PRN  sodium chloride flush, 5-40 mL, PRN  sodium chloride, , PRN  potassium chloride, 40 mEq, PRN   Or  potassium alternative oral replacement, 40 mEq, PRN   Or  potassium chloride, 10 mEq, PRN  magnesium sulfate, 2,000 mg, PRN  ondansetron, 4 mg, Q8H PRN   Or  ondansetron, 4 mg, Q6H PRN  melatonin, 3 mg, Nightly PRN  polyethylene glycol, 17 g, Daily PRN  acetaminophen, 650 mg, Q6H PRN   Or  acetaminophen, 650 mg, Q6H PRN  glucose, 4 tablet, PRN  dextrose bolus, 125 mL, PRN   Or  dextrose bolus, 250 mL, PRN  glucagon (rDNA), 1 mg, PRN  dextrose, , Continuous PRN  cyclobenzaprine, 10 mg, Nightly PRN  hydrOXYzine pamoate, 25 mg, TID PRN  meclizine, 25 mg, TID PRN         Objective:    /74   Pulse 83   Temp 99.7 °F (37.6 °C)   Resp 18   Ht 1.549 m (5' 1\")   Wt 83.6 kg (184 lb 3.2 oz)   SpO2 99%   BMI 34.80 kg/m²   \  General Appearance: alert and oriented to person, place and time and

## 2025-06-10 NOTE — CARE COORDINATION
Per physician pt is not medically cleared for discharge today, urine culture pending and pt had fever over night. Possible dc 6/11.  Dc plan remains home with [facility] who has accepted the pt with a SOC 6/12.

## 2025-06-11 VITALS
TEMPERATURE: 97.9 F | RESPIRATION RATE: 18 BRPM | HEART RATE: 65 BPM | BODY MASS INDEX: 34.78 KG/M2 | SYSTOLIC BLOOD PRESSURE: 117 MMHG | WEIGHT: 184.2 LBS | DIASTOLIC BLOOD PRESSURE: 69 MMHG | OXYGEN SATURATION: 97 % | HEIGHT: 61 IN

## 2025-06-11 LAB
ALBUMIN SERPL-MCNC: 3.1 G/DL (ref 3.5–4.6)
ALP SERPL-CCNC: 73 U/L (ref 40–130)
ALT SERPL-CCNC: 19 U/L (ref 0–33)
ANION GAP SERPL CALCULATED.3IONS-SCNC: 13 MEQ/L (ref 9–15)
AST SERPL-CCNC: 18 U/L (ref 0–35)
BASOPHILS # BLD: 0 K/UL (ref 0–0.2)
BASOPHILS NFR BLD: 0.3 %
BILIRUB SERPL-MCNC: 0.4 MG/DL (ref 0.2–0.7)
BUN SERPL-MCNC: 27 MG/DL (ref 8–23)
CALCIUM SERPL-MCNC: 9 MG/DL (ref 8.5–9.9)
CHLORIDE SERPL-SCNC: 104 MEQ/L (ref 95–107)
CO2 SERPL-SCNC: 19 MEQ/L (ref 20–31)
CREAT SERPL-MCNC: 1.48 MG/DL (ref 0.5–0.9)
EOSINOPHIL # BLD: 0.2 K/UL (ref 0–0.7)
EOSINOPHIL NFR BLD: 2.3 %
ERYTHROCYTE [DISTWIDTH] IN BLOOD BY AUTOMATED COUNT: 15.9 % (ref 11.5–14.5)
GLOBULIN SER CALC-MCNC: 3.9 G/DL (ref 2.3–3.5)
GLUCOSE BLD-MCNC: 150 MG/DL (ref 70–99)
GLUCOSE BLD-MCNC: 251 MG/DL (ref 70–99)
GLUCOSE BLD-MCNC: 98 MG/DL (ref 70–99)
GLUCOSE SERPL-MCNC: 96 MG/DL (ref 70–99)
HCT VFR BLD AUTO: 37.9 % (ref 37–47)
HGB BLD-MCNC: 12.6 G/DL (ref 12–16)
LYMPHOCYTES # BLD: 1.1 K/UL (ref 1–4.8)
LYMPHOCYTES NFR BLD: 15.2 %
MCH RBC QN AUTO: 27.8 PG (ref 27–31.3)
MCHC RBC AUTO-ENTMCNC: 33.2 % (ref 33–37)
MCV RBC AUTO: 83.5 FL (ref 79.4–94.8)
MONOCYTES # BLD: 1 K/UL (ref 0.2–0.8)
MONOCYTES NFR BLD: 13.6 %
NEUTROPHILS # BLD: 5.1 K/UL (ref 1.4–6.5)
NEUTS SEG NFR BLD: 68.3 %
PERFORMED ON: ABNORMAL
PERFORMED ON: ABNORMAL
PERFORMED ON: NORMAL
PLATELET # BLD AUTO: 211 K/UL (ref 130–400)
POTASSIUM SERPL-SCNC: 3.6 MEQ/L (ref 3.4–4.9)
PROT SERPL-MCNC: 7 G/DL (ref 6.3–8)
RBC # BLD AUTO: 4.54 M/UL (ref 4.2–5.4)
SODIUM SERPL-SCNC: 136 MEQ/L (ref 135–144)
WBC # BLD AUTO: 7.5 K/UL (ref 4.8–10.8)

## 2025-06-11 PROCEDURE — 2580000003 HC RX 258: Performed by: STUDENT IN AN ORGANIZED HEALTH CARE EDUCATION/TRAINING PROGRAM

## 2025-06-11 PROCEDURE — 6360000002 HC RX W HCPCS: Performed by: STUDENT IN AN ORGANIZED HEALTH CARE EDUCATION/TRAINING PROGRAM

## 2025-06-11 PROCEDURE — 97535 SELF CARE MNGMENT TRAINING: CPT

## 2025-06-11 PROCEDURE — 6370000000 HC RX 637 (ALT 250 FOR IP): Performed by: STUDENT IN AN ORGANIZED HEALTH CARE EDUCATION/TRAINING PROGRAM

## 2025-06-11 PROCEDURE — 80053 COMPREHEN METABOLIC PANEL: CPT

## 2025-06-11 PROCEDURE — 6360000002 HC RX W HCPCS: Performed by: INTERNAL MEDICINE

## 2025-06-11 PROCEDURE — 6370000000 HC RX 637 (ALT 250 FOR IP): Performed by: INTERNAL MEDICINE

## 2025-06-11 PROCEDURE — 2700000000 HC OXYGEN THERAPY PER DAY

## 2025-06-11 PROCEDURE — 36415 COLL VENOUS BLD VENIPUNCTURE: CPT

## 2025-06-11 PROCEDURE — 2500000003 HC RX 250 WO HCPCS: Performed by: INTERNAL MEDICINE

## 2025-06-11 PROCEDURE — 97116 GAIT TRAINING THERAPY: CPT

## 2025-06-11 PROCEDURE — 85025 COMPLETE CBC W/AUTO DIFF WBC: CPT

## 2025-06-11 RX ORDER — CEPHALEXIN 500 MG/1
500 CAPSULE ORAL 2 TIMES DAILY
Qty: 5 CAPSULE | Refills: 0 | Status: SHIPPED | OUTPATIENT
Start: 2025-06-11 | End: 2025-06-14

## 2025-06-11 RX ADMIN — CEFEPIME 2000 MG: 2 INJECTION, POWDER, FOR SOLUTION INTRAVENOUS at 13:32

## 2025-06-11 RX ADMIN — ACETAMINOPHEN 650 MG: 325 TABLET ORAL at 11:09

## 2025-06-11 RX ADMIN — INSULIN LISPRO 5 UNITS: 100 INJECTION, SOLUTION INTRAVENOUS; SUBCUTANEOUS at 13:31

## 2025-06-11 RX ADMIN — EZETIMIBE 10 MG: 10 TABLET ORAL at 11:06

## 2025-06-11 RX ADMIN — INSULIN LISPRO 4 UNITS: 100 INJECTION, SOLUTION INTRAVENOUS; SUBCUTANEOUS at 17:27

## 2025-06-11 RX ADMIN — CEFEPIME 2000 MG: 2 INJECTION, POWDER, FOR SOLUTION INTRAVENOUS at 01:58

## 2025-06-11 RX ADMIN — Medication 10 ML: at 11:08

## 2025-06-11 RX ADMIN — FERROUS SULFATE TAB 325 MG (65 MG ELEMENTAL FE) 325 MG: 325 (65 FE) TAB at 11:06

## 2025-06-11 RX ADMIN — TAMSULOSIN HYDROCHLORIDE 0.4 MG: 0.4 CAPSULE ORAL at 11:06

## 2025-06-11 RX ADMIN — ACETAMINOPHEN 650 MG: 325 TABLET ORAL at 03:43

## 2025-06-11 RX ADMIN — INSULIN LISPRO 5 UNITS: 100 INJECTION, SOLUTION INTRAVENOUS; SUBCUTANEOUS at 17:27

## 2025-06-11 RX ADMIN — DULOXETINE 60 MG: 60 CAPSULE, DELAYED RELEASE ORAL at 11:07

## 2025-06-11 RX ADMIN — FAMOTIDINE 20 MG: 20 TABLET, FILM COATED ORAL at 11:06

## 2025-06-11 RX ADMIN — ATENOLOL 25 MG: 25 TABLET ORAL at 11:07

## 2025-06-11 RX ADMIN — ENOXAPARIN SODIUM 40 MG: 100 INJECTION SUBCUTANEOUS at 11:08

## 2025-06-11 RX ADMIN — PREGABALIN 100 MG: 100 CAPSULE ORAL at 11:06

## 2025-06-11 RX ADMIN — INSULIN GLARGINE 60 UNITS: 100 INJECTION, SOLUTION SUBCUTANEOUS at 11:32

## 2025-06-11 RX ADMIN — VITAM B12 50 MCG: 100 TAB at 11:07

## 2025-06-11 RX ADMIN — PANTOPRAZOLE SODIUM 40 MG: 40 TABLET, DELAYED RELEASE ORAL at 05:40

## 2025-06-11 ASSESSMENT — PAIN SCALES - GENERAL
PAINLEVEL_OUTOF10: 6
PAINLEVEL_OUTOF10: 9

## 2025-06-11 ASSESSMENT — PAIN DESCRIPTION - DESCRIPTORS: DESCRIPTORS: ACHING

## 2025-06-11 ASSESSMENT — PAIN DESCRIPTION - LOCATION
LOCATION: BACK
LOCATION: HEAD

## 2025-06-11 ASSESSMENT — PAIN SCALES - WONG BAKER: WONGBAKER_NUMERICALRESPONSE: NO HURT

## 2025-06-11 NOTE — PLAN OF CARE
Problem: Skin/Tissue Integrity  Goal: Skin integrity remains intact  Description: 1.  Monitor for areas of redness and/or skin breakdown2.  Assess vascular access sites hourly3.  Every 4-6 hours minimum:  Change oxygen saturation probe site4.  Every 4-6 hours:  If on nasal continuous positive airway pressure, respiratory therapy assess nares and determine need for appliance change or resting period  6/11/2025 1053 by Nikia Albrecht RN  Outcome: Progressing  Flowsheets (Taken 6/11/2025 1050)  Skin Integrity Remains Intact: Monitor for areas of redness and/or skin breakdown  6/11/2025 0659 by Deena Witt RN  Outcome: Progressing     Problem: Chronic Conditions and Co-morbidities  Goal: Patient's chronic conditions and co-morbidity symptoms are monitored and maintained or improved  6/11/2025 1053 by Nikia Albrecht RN  Outcome: Progressing  6/11/2025 0659 by Deena Witt RN  Outcome: Progressing     Problem: Discharge Planning  Goal: Discharge to home or other facility with appropriate resources  6/11/2025 1053 by Nikia Albrecht RN  Outcome: Progressing  6/11/2025 0659 by Deena Witt RN  Outcome: Progressing     Problem: Safety - Adult  Goal: Free from fall injury  6/11/2025 1053 by Nikia Albrecht RN  Outcome: Progressing  6/11/2025 0659 by Deena Witt RN  Outcome: Progressing     Problem: Neurosensory - Adult  Goal: Achieves stable or improved neurological status  6/11/2025 1053 by Nikia Albrecht RN  Outcome: Progressing  6/11/2025 0659 by Deena Witt RN  Outcome: Progressing  Goal: Achieves maximal functionality and self care  6/11/2025 1053 by Nikia Albrecht RN  Outcome: Progressing  6/11/2025 0659 by Deena Witt RN  Outcome: Progressing     Problem: Musculoskeletal - Adult  Goal: Return mobility to safest level of function  6/11/2025 1053 by Nikia Albrecht, RN  Outcome: Progressing  6/11/2025 0659 by Deena Witt

## 2025-06-11 NOTE — CARE COORDINATION
Met with pt at bedside to discuss discharge planning. Updated that pt is accepted and set up with [facility] with SOC 6/12. Pt requesting walker as she has cane at home and is using one today with PT. Script obtained and LSW updated. Pt states her neighbor will be picking her up at dc.

## 2025-06-11 NOTE — CARE COORDINATION
LSW met with the pt to provide her with a walker for home through [facility].  LSW also provided some affordable housing options for the pt an her family as the place they are living in at this time has a lot of stairs.

## 2025-06-11 NOTE — DISCHARGE INSTR - COC
Continuity of Care Form    Patient Name: Agatha Quinonez   :  1960  MRN:  [id number]    Admit date:  2025  Discharge date:  25    Code Status Order: Full Code   Advance Directives:     Admitting Physician:  Lily Dumont MD  PCP: Tim Rincon MD    Discharging Nurse: ZOHREH Stephens  Discharging Hospital Unit/Room#: W266/W266-01  Discharging Unit Phone Number: [phone]    Emergency Contact:   Extended Emergency Contact Information  Primary Emergency Contact: Luana Mehta  Home Phone: [phone]  Relation: Child  Secondary Emergency Contact: Richard Servin, [facility]  Home Phone: [phone]  Mobile Phone: [phone]  Relation: Grandchild  Preferred language: English    Past Surgical History:  Past Surgical History:   Procedure Laterality Date     SECTION      ,,,    FINGER TRIGGER RELEASE Left 2020    TRIGGER RELEASE MIDDLE FINGER AND RING FINGER LEFT HAND performed by Kanwal Vincent MD at [facility] OR    FINGER TRIGGER RELEASE Right 11/3/2020    RELEASE OF RIGHT RING FINGER TRIGGER FINGER performed by Kanwal Vincent MD at [facility] OR    KIDNEY STONE SURGERY      kidney stone removal        Immunization History:   Immunization History   Administered Date(s) Administered    Influenza Virus Vaccine 10/03/2018, 2019    Influenza, AFLURIA (age 3 y+), FLUZONE, (age 6 mo+), Quadv MDV, 0.5mL 2020    Influenza, FLUCELVAX, (age 6 mo+), MDCK, Quadv PF, 0.5mL 2021    Pneumococcal, PPSV23, PNEUMOVAX 23, (age 2y+), SC/IM, 0.5mL 2020    TD 5LF, TENIVAC, (age 7y+), IM, 0.5mL 2025    TDaP, ADACEL (age 10y-64y), BOOSTRIX (age 10y+), IM, 0.5mL 2018       Active Problems:  Patient Active Problem List   Diagnosis Code    GERD (gastroesophageal reflux disease) K21.9    Type 2 diabetes mellitus with neurologic complication, with long-term current use of insulin (HCC) E11.49, Z79.4    Anemia  D64.9    Recurrent major depressive disorder, in partial remission F33.41    Chronic musculoskeletal pain M79.18, G89.29    Chronic midline low back pain without sciatica M54.50, G89.29    Chronic neck pain M54.2, G89.29    Osteopenia M85.80    Tobacco abuse Z72.0    Tachycardia R00.0    Abnormal antinuclear antibody titer R76.0    Tendinopathy M67.90    Trigger finger, right ring finger M65.341    Trigger finger, left middle finger M65.332    Vitamin D deficiency E55.9    Class 2 severe obesity due to excess calories with serious comorbidity and body mass index (BMI) of 36.0 to 36.9 in adult ([facility]) E66.812, E66.01, Z68.36    Tinea pedis of right foot B35.3    Hypomagnesemia E83.42    Atrophic kidney N26.1    Fibromyalgia M79.7    Neuropathic pain of lower extremity, left M79.2    Osteoporosis with current pathological fracture with routine healing M80.00XD    Nonadherence to medical treatment Z91.199    Hydroureteronephrosis - right, obstructive, worsening N13.30    UTI (urinary tract infection) N39.0       Isolation/Infection:   Isolation            No Isolation          Patient Infection Status    None to display              Nurse Assessment:  Last Vital Signs: /69   Pulse 65   Temp 97.9 °F (36.6 °C) (Oral)   Resp 18   Ht 1.549 m (5' 1\")   Wt 83.6 kg (184 lb 3.2 oz)   SpO2 97%   BMI 34.80 kg/m²     Last documented pain score (0-10 scale): Pain Level: 9  Last Weight:   Wt Readings from Last 1 Encounters:   06/08/25 83.6 kg (184 lb 3.2 oz)     Mental Status:  oriented, alert, coherent, logical, thought processes intact, and able to concentrate and follow conversation    IV Access:  - None    Nursing Mobility/ADLs:  Walking   Assisted  Transfer  Assisted  Bathing  Assisted  Dressing  Independent  Toileting  Independent  Feeding  Independent  Med Admin  Independent  Med Delivery   whole    Wound Care Documentation and Therapy:  Incision 11/03/20 Hand Anterior;Right (Active)   Number of days: 1681       ***    Physician Certification: I certify the above information and transfer of Agatha Quinonez  is necessary for the continuing treatment of the diagnosis listed and that she requires {Admit to Appropriate Level of Care:92150} for {GREATER/LESS:264701189} 30 days.     Update Admission H&P: {CHP DME Changes in HandP:[id number]}    PHYSICIAN SIGNATURE:  {Esignature:[id number]}

## 2025-06-11 NOTE — PROGRESS NOTES
CLINICAL PHARMACY NOTE: MEDS TO BEDS    Total # of Prescriptions Filled: 1   The following medications were delivered to the patient:  Cephalexin 500mg Cap    Additional Documentation:     none

## 2025-06-11 NOTE — PROGRESS NOTES
Physical Therapy Med Surg Daily Treatment Note  Facility/Department: [address] ORTHO TELE  Room: [facility]       NAME: Agatha Quinonez  : 1960 (65 y.o.)  MRN: [id number]  CODE STATUS: Full Code    Date of Service: 2025    Patient Diagnosis(es): UTI (urinary tract infection) [N39.0]  Acute cystitis with hematuria [N30.01]  Sepsis with encephalopathy without septic shock, due to unspecified organism ([facility]) [A41.9, R65.20, G93.41]   Chief Complaint   Patient presents with    Fatigue     Patient Active Problem List    Diagnosis Date Noted    UTI (urinary tract infection) 2025    Nonadherence to medical treatment 2021    Hydroureteronephrosis - right, obstructive, worsening 2021    Osteoporosis with current pathological fracture with routine healing 2021    Fibromyalgia 2021    Neuropathic pain of lower extremity, left 2021    Atrophic kidney 2020    Hypomagnesemia 2020    Class 2 severe obesity due to excess calories with serious comorbidity and body mass index (BMI) of 36.0 to 36.9 in adult ([facility]) 2020    Tinea pedis of right foot 2020    Vitamin D deficiency 05/10/2020    Trigger finger, right ring finger 2020    Trigger finger, left middle finger 2020    GERD (gastroesophageal reflux disease) 2020    Type 2 diabetes mellitus with neurologic complication, with long-term current use of insulin ([facility]) 2020    Anemia 2020    Recurrent major depressive disorder, in partial remission 2020    Chronic musculoskeletal pain 2020    Chronic midline low back pain without sciatica 2020    Chronic neck pain 2020    Osteopenia 2020    Tobacco abuse 2020    Tachycardia 2020    Abnormal antinuclear antibody titer 2020    Tendinopathy 2020        Past Medical History:   Diagnosis Date    Arthritis     Arthritis     Atrophic kidney 2020    Bulging lumbar disc     lower back  performs 25% of the activity; assistance is required to complete the activity  Dependent = pt requires total physical assistance to accomplish the task

## 2025-06-11 NOTE — DISCHARGE INSTR - DIET
Patient: Kimmy Diaz    Procedure Summary     Date: 11/04/20 Room / Location:  OR 03 / SURGERY [facility]    Anesthesia Start: 1044 Anesthesia Stop: 1233    Procedures:       ARTHROSCOPY, SHOULDER, WITH ROTATOR CUFF REPAIR (Right Shoulder)      ARTHROSCOPY, SHOULDER, WITH BICEPS TENOTOMY (Right Shoulder) Diagnosis: (RTC TEAR, SLAP TEAR)    Surgeons: Julio Cesar Calabrese M.D. Responsible Provider: Tobey Gansert, M.D.    Anesthesia Type: general, peripheral nerve block ASA Status: 2          Final Anesthesia Type: general, peripheral nerve block  Last vitals  BP   Blood Pressure : 140/78    Temp   36.3 °C (97.3 °F)    Pulse   Pulse: 72   Resp   16    SpO2   97 %      Anesthesia Post Evaluation    Patient location during evaluation: PACU  Patient participation: complete - patient participated  Level of consciousness: awake and alert    Airway patency: patent  Anesthetic complications: no  Cardiovascular status: hemodynamically stable  Respiratory status: acceptable  Hydration status: euvolemic    PONV: none           Nurse Pain Score: 0 (NPRS)         Good nutrition is important when healing from an illness, injury, or surgery.  Follow any nutrition recommendations given to you during your hospital stay.   If you were given an oral nutrition supplement while in the hospital, continue to take this supplement at home.  You can take it with meals, in-between meals, and/or before bedtime. These supplements can be purchased at most local grocery stores, pharmacies, and chain [employer]-stores.   If you have any questions about your diet or nutrition, call the hospital and ask for the dietitian.    Resume regular diet   risk reduction/prevention

## 2025-06-14 LAB
BACTERIA BLD CULT ORG #2: NORMAL
BACTERIA BLD CULT: NORMAL

## 2025-06-15 NOTE — PROGRESS NOTES
Physical Therapy  Facility/Department: [facility] MED SURG W266/W266-01  Physical Therapy Discharge      NAME: Agatha Quinonez    : 1960 (65 y.o.)  MRN: [id number]    Account: [id number]  Gender: female      Patient has been discharged from acute [facility] hospital. DC patient from current PT program.      Electronically signed by Lacey Stein PT on 6/15/25 at 1:02 PM EDT

## 2025-06-23 NOTE — PROGRESS NOTES
Physician Progress Note      PATIENT:               BILL NIETO  CSN #:                  [id number]  :                       1960  ADMIT DATE:       2025 11:42 PM  DISCH DATE:        2025 6:33 PM  RESPONDING  PROVIDER #:        Brian Eason MD          QUERY TEXT:    \"Metabolic encephalopathy-secondary to sepsis\" was documented in the 25   H&P.  The diagnosis was not noted in subsequent documentation.  Please clarify   the status of this condition.    The clinical indicators include:  -female age 65  - 25 00:34 WBC 16.9  P   R 16-33  -25 10:22 PM RN Amber: \"Temp 102.9. Pt with chills and shakes. PRN   Tylenol given and ice/cold rags applied.\"  - 25 H&P: \"UTI (urinary tract infection) Acute on chronic kidney injury   Hyperkalemia Metabolic encephalopathy-secondary to sepsis\"  Options provided:  -- Sepsis confirmed after study.  -- Sepsis was treated and resolved.  -- Sepsis is ruled out after study.  -- Other - I will add my own diagnosis  -- Disagree - Not applicable / Not valid  -- Disagree - Clinically unable to determine / Unknown  -- Refer to Clinical Documentation Reviewer    PROVIDER RESPONSE TEXT:    Sepsis confirmed after study.    Query created by: Gabriel Bah on 2025 1:46 PM      Electronically signed by:  Brian Eason MD 2025 9:57 AM

## (undated) DEVICE — GLOVE ORANGE PI 8   MSG9080

## (undated) DEVICE — PADDING CAST N ADH 4 YDX2 IN HIGHLY ABSORBENT EZ APPL SOFROL

## (undated) DEVICE — SUTURE ETHLN SZ 4-0 L18IN NONABSORBABLE BLK L19MM PS-2 3/8 1667H

## (undated) DEVICE — CHLORAPREP 26ML ORANGE

## (undated) DEVICE — PADDING UNDERCAST W4INXL12FT RAYON POLY SYN NONADHESIVE

## (undated) DEVICE — DRESSING GZ W1XL8IN COT XRFRM N ADH OVERWRAP CURAD

## (undated) DEVICE — HAND II: Brand: MEDLINE INDUSTRIES, INC.

## (undated) DEVICE — GOWN,AURORA,NONRNF,XL,30/CS: Brand: MEDLINE

## (undated) DEVICE — CORD,CAUTERY,BIPOLAR,STERILE: Brand: MEDLINE

## (undated) DEVICE — SPONGE GZ W4XL4IN COT 12 PLY TYP VII WVN C FLD DSGN

## (undated) DEVICE — SYRINGE IRRIG 60ML SFT PLIABLE BLB EZ TO GRP 1 HND USE W/

## (undated) DEVICE — GOWN,SIRUS,POLYRNF,BRTHSLV,XLN/XL,20/CS: Brand: MEDLINE

## (undated) DEVICE — COVER LT HNDL BLU PLAS

## (undated) DEVICE — APPLICATOR MEDICATED 26 CC SOLUTION HI LT ORNG CHLORAPREP

## (undated) DEVICE — ZIMMER® STERILE DISPOSABLE TOURNIQUET CUFF, DUAL PORT, SINGLE BLADDER, 18 IN. (46 CM)

## (undated) DEVICE — BANDAGE COMPR W2INXL5YD HI E BGE W/ CLP SURE-WRAP

## (undated) DEVICE — PADDING CAST W2INXL4YD COT LO LINTING WYTEX